# Patient Record
Sex: MALE | Race: WHITE | NOT HISPANIC OR LATINO | Employment: OTHER | URBAN - METROPOLITAN AREA
[De-identification: names, ages, dates, MRNs, and addresses within clinical notes are randomized per-mention and may not be internally consistent; named-entity substitution may affect disease eponyms.]

---

## 2023-08-11 ENCOUNTER — OFFICE VISIT (OUTPATIENT)
Dept: PRIMARY CARE | Facility: CLINIC | Age: 78
End: 2023-08-11
Payer: MEDICARE

## 2023-08-11 VITALS
HEIGHT: 75 IN | WEIGHT: 243 LBS | DIASTOLIC BLOOD PRESSURE: 62 MMHG | BODY MASS INDEX: 30.21 KG/M2 | HEART RATE: 62 BPM | SYSTOLIC BLOOD PRESSURE: 125 MMHG

## 2023-08-11 DIAGNOSIS — I77.810 DILATED AORTIC ROOT (CMS-HCC): ICD-10-CM

## 2023-08-11 DIAGNOSIS — E78.00 PURE HYPERCHOLESTEROLEMIA: ICD-10-CM

## 2023-08-11 DIAGNOSIS — I63.9 CEREBROVASCULAR ACCIDENT (CVA), UNSPECIFIED MECHANISM (MULTI): ICD-10-CM

## 2023-08-11 DIAGNOSIS — H90.6 MIXED CONDUCTIVE AND SENSORINEURAL HEARING LOSS OF BOTH EARS: ICD-10-CM

## 2023-08-11 DIAGNOSIS — H40.9 GLAUCOMA OF BOTH EYES, UNSPECIFIED GLAUCOMA TYPE: ICD-10-CM

## 2023-08-11 DIAGNOSIS — R35.0 BENIGN PROSTATIC HYPERPLASIA WITH URINARY FREQUENCY: ICD-10-CM

## 2023-08-11 DIAGNOSIS — I10 PRIMARY HYPERTENSION: ICD-10-CM

## 2023-08-11 DIAGNOSIS — I34.1 MITRAL VALVE PROLAPSE: ICD-10-CM

## 2023-08-11 DIAGNOSIS — N40.1 BENIGN PROSTATIC HYPERPLASIA WITH URINARY FREQUENCY: ICD-10-CM

## 2023-08-11 DIAGNOSIS — I50.9 HEART FAILURE, UNSPECIFIED HF CHRONICITY, UNSPECIFIED HEART FAILURE TYPE (MULTI): ICD-10-CM

## 2023-08-11 DIAGNOSIS — G47.33 OBSTRUCTIVE SLEEP APNEA (ADULT) (PEDIATRIC): ICD-10-CM

## 2023-08-11 DIAGNOSIS — I69.351 HEMIPLEGIA AND HEMIPARESIS FOLLOWING CEREBRAL INFARCTION AFFECTING RIGHT DOMINANT SIDE (MULTI): Primary | ICD-10-CM

## 2023-08-11 PROBLEM — G47.00 INSOMNIA: Status: ACTIVE | Noted: 2023-08-11

## 2023-08-11 PROBLEM — G56.20 ULNAR NERVE ENTRAPMENT: Status: ACTIVE | Noted: 2023-08-11

## 2023-08-11 PROBLEM — M48.00 SPINAL STENOSIS: Status: ACTIVE | Noted: 2023-08-11

## 2023-08-11 PROBLEM — M79.10 MYALGIA: Status: ACTIVE | Noted: 2023-08-11

## 2023-08-11 PROBLEM — M17.11 ARTHRITIS OF RIGHT KNEE: Status: ACTIVE | Noted: 2023-08-11

## 2023-08-11 PROBLEM — H93.13 BILATERAL TINNITUS: Status: RESOLVED | Noted: 2023-08-11 | Resolved: 2023-08-11

## 2023-08-11 PROBLEM — H93.13 BILATERAL TINNITUS: Status: ACTIVE | Noted: 2023-08-11

## 2023-08-11 PROBLEM — Z96.651 STATUS POST TOTAL RIGHT KNEE REPLACEMENT: Status: ACTIVE | Noted: 2023-08-11

## 2023-08-11 PROBLEM — N40.0 BPH (BENIGN PROSTATIC HYPERPLASIA): Status: ACTIVE | Noted: 2023-08-11

## 2023-08-11 PROBLEM — M17.12 ARTHRITIS OF KNEE, LEFT: Status: ACTIVE | Noted: 2023-08-11

## 2023-08-11 PROBLEM — R91.1 LUNG NODULE: Status: ACTIVE | Noted: 2023-08-11

## 2023-08-11 PROBLEM — G89.29 CHRONIC PAIN: Status: ACTIVE | Noted: 2023-08-11

## 2023-08-11 PROBLEM — M15.9 GENERALIZED OSTEOARTHRITIS OF MULTIPLE SITES: Status: ACTIVE | Noted: 2023-08-11

## 2023-08-11 PROBLEM — Z79.01 LONG TERM (CURRENT) USE OF ANTICOAGULANTS: Status: ACTIVE | Noted: 2021-05-17

## 2023-08-11 PROBLEM — M19.019 ARTHRITIS OF SHOULDER: Status: ACTIVE | Noted: 2023-08-11

## 2023-08-11 PROBLEM — J31.0 CHRONIC RHINITIS: Status: ACTIVE | Noted: 2023-08-11

## 2023-08-11 PROCEDURE — 3074F SYST BP LT 130 MM HG: CPT | Performed by: NURSE PRACTITIONER

## 2023-08-11 PROCEDURE — 3078F DIAST BP <80 MM HG: CPT | Performed by: NURSE PRACTITIONER

## 2023-08-11 PROCEDURE — 1036F TOBACCO NON-USER: CPT | Performed by: NURSE PRACTITIONER

## 2023-08-11 PROCEDURE — 1125F AMNT PAIN NOTED PAIN PRSNT: CPT | Performed by: NURSE PRACTITIONER

## 2023-08-11 PROCEDURE — 1159F MED LIST DOCD IN RCRD: CPT | Performed by: NURSE PRACTITIONER

## 2023-08-11 PROCEDURE — 1160F RVW MEDS BY RX/DR IN RCRD: CPT | Performed by: NURSE PRACTITIONER

## 2023-08-11 PROCEDURE — 99212 OFFICE O/P EST SF 10 MIN: CPT | Performed by: NURSE PRACTITIONER

## 2023-08-11 RX ORDER — NAPROXEN SODIUM 220 MG/1
1 TABLET, FILM COATED ORAL DAILY
COMMUNITY

## 2023-08-11 RX ORDER — ATORVASTATIN CALCIUM 40 MG/1
40 TABLET, FILM COATED ORAL NIGHTLY
COMMUNITY
End: 2024-01-12 | Stop reason: SDUPTHER

## 2023-08-11 RX ORDER — TRAMADOL HYDROCHLORIDE 50 MG/1
1 TABLET ORAL AS NEEDED
COMMUNITY
Start: 2023-01-12

## 2023-08-11 RX ORDER — TRAZODONE HYDROCHLORIDE 50 MG/1
50 TABLET ORAL NIGHTLY
COMMUNITY
Start: 2021-05-17 | End: 2024-01-12 | Stop reason: SDUPTHER

## 2023-08-11 RX ORDER — LOSARTAN POTASSIUM 100 MG/1
100 TABLET ORAL DAILY
COMMUNITY
End: 2024-01-12 | Stop reason: ALTCHOICE

## 2023-08-11 RX ORDER — TAMSULOSIN HYDROCHLORIDE 0.4 MG/1
1 CAPSULE ORAL DAILY
COMMUNITY

## 2023-08-11 RX ORDER — NETARSUDIL AND LATANOPROST OPHTHALMIC SOLUTION, 0.02%/0.005% .2; .05 MG/ML; MG/ML
SOLUTION/ DROPS OPHTHALMIC; TOPICAL
COMMUNITY
Start: 2022-09-22

## 2023-08-11 RX ORDER — BRIMONIDINE TARTRATE 1 MG/ML
SOLUTION/ DROPS OPHTHALMIC 2 TIMES DAILY
COMMUNITY

## 2023-08-11 ASSESSMENT — ENCOUNTER SYMPTOMS
OCCASIONAL FEELINGS OF UNSTEADINESS: 0
LOSS OF SENSATION IN FEET: 0
DEPRESSION: 0

## 2023-08-11 NOTE — PATIENT INSTRUCTIONS
Thank you for seeing me today. It was a pleasure to see you again!     #HTN  c/w Losartan 100 mg  daily  Your blood pressure should be </= 130/80. Today your blood pressure was 125/62  You should avoid foods that are high in sodium and saturated fats  Exercise daily for at least 30 minutes  minutes/week  Avoid stressful situations as this can increase your blood pressure  Take your medications as prescribed--do not skip doses  Obtain a BP monitor and check your blood pressure at home. Check it around the same time each day, at least 1 hour after taking your medication. Record your blood pressure in a log and bring your log with you to your next appointment.     [facility]      RTC FAY FOR AWV AND AS NEEDED

## 2023-08-11 NOTE — PROGRESS NOTES
"Subjective   Chief Complaint   Patient presents with    Follow-up     Willy is here today for routine 6 month F/U.        Patient ID: Willy Reed is a 78 y.o. male who presents for Follow-up (Willy is here today for routine 6 month F/U. ).    HPI  Est 77 yo male pt presents today for 6 mo f/u on HTN  [facility], and sees a CNP M.Mueller twice/year  [facility] most of [facility]s [facility], except Atorvastatin, Trazodone, Losartan,and Tramadol    PMH: HTN, HLD, GLAUCOMA, BPH, CVA X 3 (1999, 2017, 2021), INSOMNIA      [facility] and as fitted for new hearing aids  Will be picking them up later this month         Pt does not need any med refills today         #HTN       BP= 142/80 in Feb 2023       Rx Losartan 100 mg daily        BP today= 125/62       Cardiology: Dr. Crockett     #INSOMNIA  Pt takes Trazodone occasionally at bedtime for sleep, not nightly     #ARTHRITIC PAIN  Pt takes Tramadol every once in awhile for knee/shoulder pain, does not take daily/regularly   Pt is seeing Dr. Haney for his knee, has received injections     #CVA  1st occurrence 1999  had smaller strokes in 2017 and 2021  Rx Eliquis and Atorvastatin      #BPH  Taking Tamsulosin   no concerns     #HLD  FLP---> 107/55/49 Dec 2022  Rx Atorvastatin 80 mg   denies myalgias     Review of Systems  All 13 systems were reviewed and are within normal limits except positive and pertinent negative responses which are noted below or in HPI.      Objective   /62   Pulse 62   Ht 1.905 m (6' 3\")   Wt 110 kg (243 lb)   BMI 30.37 kg/m²        Physical Exam  Vitals reviewed.   Cardiovascular:      Pulses: Normal pulses.   Pulmonary:      Effort: Pulmonary effort is normal.   Musculoskeletal:         General: Normal range of motion.   Skin:     Capillary Refill: Capillary refill takes less than 2 seconds.   Neurological:      General: No focal deficit present.      Mental Status: He is alert and " oriented to person, place, and time.   Psychiatric:         Mood and Affect: Mood normal.         Assessment/Plan   Problem List Items Addressed This Visit       RESOLVED: Hemiplegia and hemiparesis following cerebral infarction affecting right dominant side (CMS/HCC) - Primary    Dilated aortic root (CMS/HCC)    Cerebrovascular accident (CVA) (CMS/HCC)    BPH (benign prostatic hyperplasia)    Glaucoma     [facility]          HTN (hypertension)    Mitral valve prolapse    Mixed conductive and sensorineural hearing loss of both ears    Obstructive sleep apnea (adult) (pediatric)     Wears CPAP nightly          Pure hypercholesterolemia

## 2023-09-06 ENCOUNTER — HOSPITAL ENCOUNTER (OUTPATIENT)
Dept: DATA CONVERSION | Facility: HOSPITAL | Age: 78
End: 2023-09-06

## 2023-09-06 DIAGNOSIS — R91.1 SOLITARY PULMONARY NODULE: ICD-10-CM

## 2023-10-18 PROBLEM — L90.5 SCAR CONDITION AND FIBROSIS OF SKIN: Status: ACTIVE | Noted: 2018-01-12

## 2023-10-18 PROBLEM — S20.219A RIB CONTUSION: Status: ACTIVE | Noted: 2023-10-18

## 2023-10-18 PROBLEM — H61.20 CERUMEN IMPACTION: Status: ACTIVE | Noted: 2023-10-18

## 2023-10-18 PROBLEM — M25.519 SHOULDER PAIN: Status: ACTIVE | Noted: 2023-10-18

## 2023-10-18 PROBLEM — N40.1 ENLARGED PROSTATE WITH LOWER URINARY TRACT SYMPTOMS (LUTS): Status: ACTIVE | Noted: 2023-10-18

## 2023-10-18 PROBLEM — E78.49 FAMILIAL COMBINED HYPERLIPIDEMIA: Status: ACTIVE | Noted: 2023-10-18

## 2023-10-18 PROBLEM — M79.609 LIMB PAIN: Status: ACTIVE | Noted: 2023-10-18

## 2023-10-18 PROBLEM — D18.01 HEMANGIOMA OF SKIN AND SUBCUTANEOUS TISSUE: Status: ACTIVE | Noted: 2018-01-12

## 2023-10-18 PROBLEM — L81.4 OTHER MELANIN HYPERPIGMENTATION: Status: ACTIVE | Noted: 2018-01-12

## 2023-10-18 PROBLEM — D22.30 MELANOCYTIC NEVI OF UNSPECIFIED PART OF FACE: Status: ACTIVE | Noted: 2018-01-12

## 2023-10-18 PROBLEM — M25.569 JOINT PAIN, KNEE: Status: ACTIVE | Noted: 2023-10-18

## 2023-10-18 PROBLEM — D49.9 NEOPLASM: Status: ACTIVE | Noted: 2018-01-12

## 2023-10-18 PROBLEM — L91.8 OTHER HYPERTROPHIC DISORDERS OF THE SKIN: Status: ACTIVE | Noted: 2018-01-12

## 2023-10-18 PROBLEM — D49.2 NEOPLASM OF UNSPECIFIED BEHAVIOR OF BONE, SOFT TISSUE, AND SKIN: Status: ACTIVE | Noted: 2018-01-12

## 2023-10-18 PROBLEM — Z79.899 HIGH RISK MEDICATION USE: Status: ACTIVE | Noted: 2023-10-18

## 2023-10-18 PROBLEM — D22.60 MELANOCYTIC NEVI OF UNSPECIFIED UPPER LIMB, INCLUDING SHOULDER: Status: ACTIVE | Noted: 2018-01-12

## 2023-10-18 PROBLEM — R91.1 LESION OF LUNG: Status: ACTIVE | Noted: 2023-10-18
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
## 2023-10-18 PROBLEM — I63.9 STROKE (MULTI): Status: ACTIVE | Noted: 2023-10-18

## 2023-10-18 PROBLEM — Z86.73 HISTORY OF CEREBROVASCULAR ACCIDENT: Status: ACTIVE | Noted: 2023-10-18

## 2023-10-18 PROBLEM — L82.1 OTHER SEBORRHEIC KERATOSIS: Status: ACTIVE | Noted: 2018-01-12

## 2023-10-18 PROBLEM — R10.9 ABDOMINAL PAIN: Status: ACTIVE | Noted: 2023-10-18

## 2023-10-18 RX ORDER — BISMUTH SUBSALICYLATE 262 MG
1 TABLET,CHEWABLE ORAL DAILY
COMMUNITY

## 2023-10-18 RX ORDER — LOSARTAN POTASSIUM AND HYDROCHLOROTHIAZIDE 25; 100 MG/1; MG/1
1 TABLET ORAL DAILY
COMMUNITY

## 2023-10-18 RX ORDER — BRIMONIDINE TARTRATE 1.5 MG/ML
1 SOLUTION/ DROPS OPHTHALMIC 2 TIMES DAILY
COMMUNITY
End: 2024-01-12 | Stop reason: SDUPTHER

## 2023-10-18 RX ORDER — ASPIRIN 81 MG/1
81 TABLET ORAL DAILY
COMMUNITY
End: 2024-01-12 | Stop reason: WASHOUT

## 2023-10-19 ENCOUNTER — OFFICE VISIT (OUTPATIENT)
Dept: ORTHOPEDIC SURGERY | Facility: CLINIC | Age: 78
End: 2023-10-19
Payer: MEDICARE

## 2023-10-19 DIAGNOSIS — M17.12 ARTHRITIS OF KNEE, LEFT: Primary | ICD-10-CM

## 2023-10-19 PROCEDURE — 1159F MED LIST DOCD IN RCRD: CPT | Performed by: ORTHOPAEDIC SURGERY

## 2023-10-19 PROCEDURE — 99214 OFFICE O/P EST MOD 30 MIN: CPT | Performed by: ORTHOPAEDIC SURGERY

## 2023-10-19 PROCEDURE — 1036F TOBACCO NON-USER: CPT | Performed by: ORTHOPAEDIC SURGERY

## 2023-10-19 PROCEDURE — 20610 DRAIN/INJ JOINT/BURSA W/O US: CPT | Performed by: ORTHOPAEDIC SURGERY

## 2023-10-19 PROCEDURE — 1125F AMNT PAIN NOTED PAIN PRSNT: CPT | Performed by: ORTHOPAEDIC SURGERY

## 2023-10-19 PROCEDURE — 1160F RVW MEDS BY RX/DR IN RCRD: CPT | Performed by: ORTHOPAEDIC SURGERY

## 2023-10-19 NOTE — PROGRESS NOTES
This is a consultation from CHING Mancilla-CNP for   Chief Complaint   Patient presents with    Left Knee - Pain       This is a 78 y.o. male who presents for evaluation of left knee pain.  Patient is left knee arthritis, cortisone injection little under a year ago.  Given good relief for a long time.  He is noted in the last 1 to 2 months has had return of symptoms exacerbation of his pain.  Planes of sharp stabbing pain over the medial aspect of the knee worse with walking.  Improving with rest.  Occasionally takes Tylenol but not often.  Does not use any assistive devices.  Able to do all his normal activities.    Physical Exam    There has been no interval change in this patient's past medical, surgical, medications, allergies, family history or social history since the most recent visit to a provider within our department. 14 point review of systems was performed, reviewed, and negative except for pertinent positives documented in the history of present illness.     Constitutional: well developed, well nourished male in no acute distress  Psychiatric: normal mood, appropriate affect  Eyes: sclera anicteric  HENT: normocephalic/atraumatic  CV: regular rate and rhythm   Respiratory: non labored breathing  Integumentary: no rash  Neurological: moves all extremities    Left knee exam: skin intact no lacerations or abrations.  1+ effusion.  Tender medial joint line. negative log roll negative patellar grind. ROM 5-100. stable to varus and valgus stress at 0 and 30 degrees. negative lachman negative posterior drawer negative kathleen. 5/5 ehl/fhl/gs/ta. silt s/s/sp/dp/t. 2+ dp/pt        Xrays were ordered by me, they were reviewed and independently interpreted by me today, they show severe degenerative disease bone-on-bone arthritis    Procedure Note:    Diagnosis: left knee arthritis  Procedure: left knee injection    Verbal consent was obtained from the patient, risks benefits and alternatives were  discussed. We discussed the risks and benefits and potential morbidity related to the treatment, and to the prescription medication administered in the injectionA timeout was performed, and the correct patient and site of injection were identified and verified. The lateral side of the knee was sterilized with Betadine, and anesthetized with ethyl chloride spray. The left knee was injected with 1ml kenalog and 4ml lidocaine in the usual fashion. A sterile Band-Aid was placed. The patient tolerated the procedure well with no immediate complications. Standard post injection precautions and instructions were given.        Procedures      Impression/Plan: This is a 78 y.o. male with left knee arthritis.  I had an in depth discussion with the patient regarding treatment options for arthritis and their relative risks and benefits. We reviewed surgical and nonsurgical option for treatment. Treatments include anti inflammatory medications, physical therapy, weight loss, activity modification, use of assistive devices, injection therapies. We discussed current prescriptions and risks and benefits of continuation of prescription medication as apporpriate. We discussed that arthritis is often progressive over time, an in end stage arthritis surgical interventions can be considered, including arthroplasty. All questions were answered and the patient voiced their understanding.  Doing well with nonsurgical treatment I will see him back as needed    BMI Readings from Last 1 Encounters:   08/11/23 30.37 kg/m²      Lab Results   Component Value Date    CREATININE 1.02 02/14/2023     Tobacco Use: Low Risk  (10/19/2023)    Patient History     Smoking Tobacco Use: Never     Smokeless Tobacco Use: Never     Passive Exposure: Never      Computed MELD 3.0 unavailable. Necessary lab results were not found in the last year.  Computed MELD-Na unavailable. Necessary lab results were not found in the last year.       Lab Results   Component  "Value Date    [facility] 6.3 (A) 01/25/2022     No results found for: \"STAPHMRSASCR\"  "

## 2023-12-05 RX ORDER — TRIAMCINOLONE ACET/0.9%NACL/PF 40 MG/ML
10 VIAL (ML) INJECTION ONCE
Status: COMPLETED | OUTPATIENT
Start: 2023-12-05 | End: 2023-12-05

## 2023-12-05 RX ADMIN — Medication 10 MG: at 16:40

## 2023-12-05 RX ADMIN — Medication 10 MG: at 10:36

## 2023-12-31 PROBLEM — I50.9 HEART FAILURE, UNSPECIFIED HF CHRONICITY, UNSPECIFIED HEART FAILURE TYPE (MULTI): Status: ACTIVE | Noted: 2023-12-31

## 2024-01-11 NOTE — PROGRESS NOTES
"Subjective   Reason for Visit: Willy Reed is an 78 y.o. male here for a Medicare Wellness visit.     Past Medical, Surgical, and Family History reviewed and updated in chart.    Reviewed all medications by prescribing practitioner or clinical pharmacist (such as prescriptions, OTCs, herbal therapies and supplements) and documented in the medical record.    HPI    Willy is an 79 yo male pt presenting today for  f/u on HTN and AWV     [facility], and sees LEO Metcalf twice/year  [facility] most of [facility], except Atorvastatin, Trazodone, Losartan-hydrochlorothiazide and Tramadol     PMH: HTN, HLD, GLAUCOMA, BPH, CVA X 3 (1999, 2017, 2021), INSOMNIA      [facility] and was fitted for new hearing aids    Pt started using Diclofenac on his hands and its     helping     Pt had labs (CBC, CMP)  done 12/4/23 per [facility]     #HTN  Rx Losartan 100-hydrochlorothiazide 25 mg daily   BP today= 136/84  Cardiology: Dr. Crockett     #INSOMNIA  Pt takes Trazodone occasionally at bedtime for sleep, not nightly     #ARTHRITIC PAIN  Pt takes Tramadol every once in awhile for knee/shoulder pain, does not take daily/regularly   Pt is seeing Dr. Haney for his knee, has received injections     #CVA  1st occurrence 1999  had smaller strokes in 2017 and 2021  Rx Eliquis and Atorvastatin      #BPH  Taking Tamsulosin   no concerns     #HLD  FLP---> 106/49/65 Feb 2023  Rx Atorvastatin 80 mg   denies myalgias       Patient Care Team:  MYNOR Longoria as PCP - General (Family Medicine)  MY[facility]  Keily Phelps MD as Primary Care Provider       Review of Systems  All 13 systems were reviewed and are within normal limits except positive and pertinent negative responses which are noted below or in HPI.      Objective   Vitals:  /84   Pulse 58   Ht 1.905 m (6' 3\")   Wt 111 kg (244 lb)   " SpO2 95%   BMI 30.50 kg/m²       Current Outpatient Medications   Medication Instructions    apixaban (Eliquis) 5 mg tablet 1 tablet, oral, Every 12 hours    aspirin 81 mg chewable tablet 1 tablet, oral, Daily    atorvastatin (LIPITOR) 40 mg, oral, Nightly    brimonidine (AlphaGAN P) 0.1 % ophthalmic solution ophthalmic (eye), 2 times daily    losartan-hydrochlorothiazide (Hyzaar) 100-25 mg tablet 1 tablet, oral, Daily    multivitamin tablet 1 tablet, oral, Daily    netarsudiL-latanoprost (Rocklatan) 0.02-0.005 % drops ophthalmic (eye), Daily RT    tamsulosin (Flomax) 0.4 mg 24 hr capsule 1 capsule, oral, Daily    traMADol (Ultram) 50 mg tablet 1 tablet, oral, Every 8 hours    traZODone (DESYREL) 50 mg, oral, Nightly         Physical Exam  Vitals reviewed.   Cardiovascular:      Pulses: Normal pulses.      Heart sounds: Normal heart sounds.   Pulmonary:      Effort: Pulmonary effort is normal.      Breath sounds: Normal breath sounds.   Psychiatric:         Mood and Affect: Mood normal.         Assessment/Plan     Problem List Items Addressed This Visit             ICD-10-CM    BPH (benign prostatic hyperplasia) N40.0    HTN (hypertension) I10    Relevant Medications    atorvastatin (Lipitor) 40 mg tablet    Insomnia G47.00    Relevant Medications    traZODone (Desyrel) 50 mg tablet    Mitral valve prolapse I34.1    Long term (current) use of anticoagulants Z79.01    Enlarged prostate with lower urinary tract symptoms (LUTS) N40.1    High risk medication use Z79.899     Other Visit Diagnoses         Codes    Medicare annual wellness visit, subsequent    -  Primary Z00.00    Routine general medical examination at health care facility     Z00.00

## 2024-01-12 ENCOUNTER — OFFICE VISIT (OUTPATIENT)
Dept: PRIMARY CARE | Facility: CLINIC | Age: 79
End: 2024-01-12
Payer: MEDICARE

## 2024-01-12 VITALS
BODY MASS INDEX: 30.34 KG/M2 | OXYGEN SATURATION: 95 % | HEIGHT: 75 IN | SYSTOLIC BLOOD PRESSURE: 136 MMHG | DIASTOLIC BLOOD PRESSURE: 84 MMHG | WEIGHT: 244 LBS | HEART RATE: 58 BPM

## 2024-01-12 DIAGNOSIS — Z00.00 ROUTINE GENERAL MEDICAL EXAMINATION AT HEALTH CARE FACILITY: ICD-10-CM

## 2024-01-12 DIAGNOSIS — I34.1 MITRAL VALVE PROLAPSE: ICD-10-CM

## 2024-01-12 DIAGNOSIS — R35.0 BENIGN PROSTATIC HYPERPLASIA WITH URINARY FREQUENCY: ICD-10-CM

## 2024-01-12 DIAGNOSIS — Z79.01 LONG TERM (CURRENT) USE OF ANTICOAGULANTS: ICD-10-CM

## 2024-01-12 DIAGNOSIS — F51.01 PRIMARY INSOMNIA: ICD-10-CM

## 2024-01-12 DIAGNOSIS — I10 PRIMARY HYPERTENSION: ICD-10-CM

## 2024-01-12 DIAGNOSIS — R35.1 BENIGN PROSTATIC HYPERPLASIA WITH NOCTURIA: ICD-10-CM

## 2024-01-12 DIAGNOSIS — Z79.899 HIGH RISK MEDICATION USE: ICD-10-CM

## 2024-01-12 DIAGNOSIS — Z00.00 MEDICARE ANNUAL WELLNESS VISIT, SUBSEQUENT: Primary | ICD-10-CM

## 2024-01-12 DIAGNOSIS — N40.1 BENIGN PROSTATIC HYPERPLASIA WITH NOCTURIA: ICD-10-CM

## 2024-01-12 DIAGNOSIS — N40.1 BENIGN PROSTATIC HYPERPLASIA WITH URINARY FREQUENCY: ICD-10-CM

## 2024-01-12 PROCEDURE — 1159F MED LIST DOCD IN RCRD: CPT | Performed by: NURSE PRACTITIONER

## 2024-01-12 PROCEDURE — 3079F DIAST BP 80-89 MM HG: CPT | Performed by: NURSE PRACTITIONER

## 2024-01-12 PROCEDURE — 1170F FXNL STATUS ASSESSED: CPT | Performed by: NURSE PRACTITIONER

## 2024-01-12 PROCEDURE — 1125F AMNT PAIN NOTED PAIN PRSNT: CPT | Performed by: NURSE PRACTITIONER

## 2024-01-12 PROCEDURE — 1036F TOBACCO NON-USER: CPT | Performed by: NURSE PRACTITIONER

## 2024-01-12 PROCEDURE — 99213 OFFICE O/P EST LOW 20 MIN: CPT | Performed by: NURSE PRACTITIONER

## 2024-01-12 PROCEDURE — 3075F SYST BP GE 130 - 139MM HG: CPT | Performed by: NURSE PRACTITIONER

## 2024-01-12 PROCEDURE — G0439 PPPS, SUBSEQ VISIT: HCPCS | Performed by: NURSE PRACTITIONER

## 2024-01-12 RX ORDER — ATORVASTATIN CALCIUM 40 MG/1
40 TABLET, FILM COATED ORAL NIGHTLY
Qty: 90 TABLET | Refills: 3 | Status: SHIPPED | OUTPATIENT
Start: 2024-01-12 | End: 2024-03-15

## 2024-01-12 RX ORDER — TRAZODONE HYDROCHLORIDE 50 MG/1
50 TABLET ORAL NIGHTLY
Qty: 90 TABLET | Refills: 0 | Status: SHIPPED | OUTPATIENT
Start: 2024-01-12

## 2024-01-12 ASSESSMENT — ACTIVITIES OF DAILY LIVING (ADL)
BATHING: INDEPENDENT
MANAGING_FINANCES: INDEPENDENT
DRESSING: INDEPENDENT
GROCERY_SHOPPING: INDEPENDENT
TAKING_MEDICATION: INDEPENDENT
DOING_HOUSEWORK: INDEPENDENT

## 2024-01-12 ASSESSMENT — PATIENT HEALTH QUESTIONNAIRE - PHQ9
SUM OF ALL RESPONSES TO PHQ9 QUESTIONS 1 AND 2: 0
2. FEELING DOWN, DEPRESSED OR HOPELESS: NOT AT ALL
1. LITTLE INTEREST OR PLEASURE IN DOING THINGS: NOT AT ALL

## 2024-01-12 ASSESSMENT — ENCOUNTER SYMPTOMS
DEPRESSION: 0
LOSS OF SENSATION IN FEET: 0
OCCASIONAL FEELINGS OF UNSTEADINESS: 0

## 2024-01-12 NOTE — PATIENT INSTRUCTIONS
Thank you for seeing me today.  It was a pleasure to see you again!     Today we did your Annual Medicare Wellness Exam and discussed the following:     Continue medications as prescribed    [facility] per [facility]    RTC ANNUALLY AND AS NEEDED

## 2024-01-17 ENCOUNTER — OFFICE VISIT (OUTPATIENT)
Dept: OTOLARYNGOLOGY | Facility: CLINIC | Age: 79
End: 2024-01-17
Payer: MEDICARE

## 2024-01-17 VITALS — BODY MASS INDEX: 30.21 KG/M2 | WEIGHT: 243 LBS | HEIGHT: 75 IN

## 2024-01-17 DIAGNOSIS — H90.3 BILATERAL SENSORINEURAL HEARING LOSS: ICD-10-CM

## 2024-01-17 DIAGNOSIS — H61.23 BILATERAL IMPACTED CERUMEN: ICD-10-CM

## 2024-01-17 DIAGNOSIS — G47.33 OBSTRUCTIVE SLEEP APNEA: Primary | ICD-10-CM

## 2024-01-17 PROCEDURE — 99213 OFFICE O/P EST LOW 20 MIN: CPT | Performed by: OTOLARYNGOLOGY

## 2024-01-17 PROCEDURE — 1159F MED LIST DOCD IN RCRD: CPT | Performed by: OTOLARYNGOLOGY

## 2024-01-17 PROCEDURE — 1036F TOBACCO NON-USER: CPT | Performed by: OTOLARYNGOLOGY

## 2024-01-17 PROCEDURE — 1160F RVW MEDS BY RX/DR IN RCRD: CPT | Performed by: OTOLARYNGOLOGY

## 2024-01-17 PROCEDURE — 1125F AMNT PAIN NOTED PAIN PRSNT: CPT | Performed by: OTOLARYNGOLOGY

## 2024-01-17 NOTE — PROGRESS NOTES
"HPI  Willy Reed is a 78 y.o. male prone to wax.  Requires cleaning every 6 months.  Wears bilateral hearing aids.  Has CPAP and uses faithfully.  Doing very well with this.  Here for 6-month cerumen management.  He has not having specific plugging, otalgia or otorrhea.      Past Medical History:   Diagnosis Date    Cerebral infarction, unspecified (CMS/HCC) 01/25/2022    CVA (cerebral vascular accident)    Essential (primary) hypertension 09/22/2022    HTN (hypertension)    Personal history of other diseases of the circulatory system 12/17/2019    History of atrial septal defect    Personal history of other diseases of the nervous system and sense organs     History of sleep apnea    Sleep apnea             Medications:     Current Outpatient Medications:     apixaban (Eliquis) 5 mg tablet, Take 1 tablet (5 mg) by mouth every 12 hours., Disp: , Rfl:     aspirin 81 mg chewable tablet, Chew 1 tablet (81 mg) once daily., Disp: , Rfl:     atorvastatin (Lipitor) 40 mg tablet, Take 1 tablet (40 mg) by mouth once daily at bedtime., Disp: 90 tablet, Rfl: 3    brimonidine (AlphaGAN P) 0.1 % ophthalmic solution, Administer into affected eye(s) twice a day., Disp: , Rfl:     losartan-hydrochlorothiazide (Hyzaar) 100-25 mg tablet, Take 1 tablet by mouth once daily., Disp: , Rfl:     multivitamin tablet, Take 1 tablet by mouth once daily., Disp: , Rfl:     netarsudiL-latanoprost (Rocklatan) 0.02-0.005 % drops, Administer into affected eye(s) once daily., Disp: , Rfl:     tamsulosin (Flomax) 0.4 mg 24 hr capsule, Take 1 capsule (0.4 mg) by mouth once daily., Disp: , Rfl:     traMADol (Ultram) 50 mg tablet, Take 1 tablet (50 mg) by mouth every 8 hours., Disp: , Rfl:     traZODone (Desyrel) 50 mg tablet, Take 1 tablet (50 mg) by mouth once daily at bedtime., Disp: 90 tablet, Rfl: 0     Allergies:  Allergies   Allergen Reactions    Morphine Nausea And Vomiting        Physical Exam:  Last Recorded Vitals  Height 1.905 m (6' 3\"), " weight 110 kg (243 lb).  General:     General appearance: Well-developed, well-nourished in no acute distress.       Voice:  normal       Head/face: Normal appearance; nontender to palpation     Facial nerve function: Normal and symmetric bilaterally.    Oral/oropharynx:     Oral vestibule: Normal labial and gingival mucosa     Tongue/floor of mouth: Normal without lesion     Oropharynx: Clear.  No lesions present of the hard/soft palate, posterior pharynx    Neck:     Neck: Normal appearance, trachea midline     Salivary glands: Normal to palpation bilaterally     Lymph nodes: No cervical lymphadenopathy to palpation     Thyroid: No thyromegaly.  No palpable nodules     Range of motion: Normal    Neurological:     Cortical functions: Alert and oriented x3, appropriate affect       Larynx/hypopharynx:     Laryngeal findings: Mirror exam inadequate or limited secondary to enlarged base of tongue and/or excessive gagging    Ear:     Ear canal: Normal bilaterally after cleaning occlusive cerumen impaction bilaterally with microscope, suction, alligator     Tympanic membrane: Intact and mobile bilaterally     Pinna: Normal bilaterally     Hearing:  Gross hearing assessment normal by voice    Nose:     Visualized using: Anterior rhinoscopy     Nasopharynx: Inadequate mirror exam secondary to gag, anatomy.       Nasal dorsum: Nontraumatic midline appearance     Septum: Midline     Inferior turbinates: Normally sized     Mucosa: Bilateral, pink, normal appearing       Assessment/Plan   Ear canals cleaned bilaterally.  Hearing aids were placed.  He will continue his hearing aids and follow-up with us in 6 months.  He will continue his CPAP in the meantime         Con Martinez MD

## 2024-03-04 ENCOUNTER — APPOINTMENT (OUTPATIENT)
Dept: RADIOLOGY | Facility: CLINIC | Age: 79
End: 2024-03-04
Payer: MEDICARE

## 2024-03-06 ENCOUNTER — HOSPITAL ENCOUNTER (OUTPATIENT)
Dept: RADIOLOGY | Facility: CLINIC | Age: 79
Discharge: HOME | End: 2024-03-06
Payer: MEDICARE

## 2024-03-06 DIAGNOSIS — R91.1 SOLITARY PULMONARY NODULE: ICD-10-CM

## 2024-03-06 PROCEDURE — 71250 CT THORAX DX C-: CPT | Performed by: RADIOLOGY

## 2024-03-06 PROCEDURE — 71250 CT THORAX DX C-: CPT

## 2024-03-15 DIAGNOSIS — I10 PRIMARY HYPERTENSION: ICD-10-CM

## 2024-03-15 RX ORDER — ATORVASTATIN CALCIUM 40 MG/1
40 TABLET, FILM COATED ORAL NIGHTLY
Qty: 90 TABLET | Refills: 3 | Status: SHIPPED | OUTPATIENT
Start: 2024-03-15 | End: 2024-03-22 | Stop reason: SDUPTHER

## 2024-03-20 NOTE — PROGRESS NOTES
"Chief Complaint:   No chief complaint on file.    History Of Present Illness:    .Mr Reed returns in follow up.  Denies chest pain, sob, palpitations or pedal edema.  [facility]         Last Recorded Vitals:  Blood pressure 123/73, pulse 70, resp. rate 16, height 1.905 m (6' 3\"), weight 111 kg (244 lb), SpO2 96 %.     Past Medical History:  Past Medical History:   Diagnosis Date    Cerebral infarction, unspecified (CMS/HCC) 01/25/2022    CVA (cerebral vascular accident)    Essential (primary) hypertension 09/22/2022    HTN (hypertension)    Personal history of other diseases of the circulatory system 12/17/2019    History of atrial septal defect    Personal history of other diseases of the nervous system and sense organs     History of sleep apnea    Sleep apnea         Past Surgical History:  Past Surgical History:   Procedure Laterality Date    CORONARY ARTERY BYPASS GRAFT  09/01/2016    [facility]ABG    MR HEAD ANGIO WO IV CONTRAST  6/28/2016    MR HEAD ANGIO WO IV CONTRAST 6/28/2016 [facility]    MR HEAD ANGIO WO IV CONTRAST  1/19/2021    MR HEAD ANGIO WO IV CONTRAST [facility]    MR NECK ANGIO WO IV CONTRAST  6/28/2016    MR NECK ANGIO WO IV CONTRAST 6/28/2016 [facility]    MR NECK ANGIO WO IV CONTRAST  1/20/2021    MR NECK ANGIO WO IV CONTRAST [facility]    OTHER SURGICAL HISTORY  06/25/2013    Injection Of Neurolytic Substance Epidural Thoracic    OTHER SURGICAL HISTORY  07/25/2022    Heart surgery    OTHER SURGICAL HISTORY  07/25/2022    Knee surgery    OTHER SURGICAL HISTORY  09/01/2016    Shoulder Surgery Left    SHOULDER SURGERY  09/01/2016    Shoulder Surgery Right       Social History:  Social History     Socioeconomic History    Marital status:      Spouse name: None    Number of children: None    Years of education: None    Highest education level: None   Occupational History    None   Tobacco Use    Smoking status: Never     " Passive exposure: Never    Smokeless tobacco: Never   Vaping Use    Vaping Use: Never used   Substance and Sexual Activity    Alcohol use: Not Currently     Comment: rare    Drug use: Never    Sexual activity: None   Other Topics Concern    None   Social History Narrative    None     Social Determinants of Health     Financial Resource Strain: Not on file   Food Insecurity: Not on file   Transportation Needs: Not on file   Physical Activity: Not on file   Stress: Not on file   Social Connections: Not on file   Intimate Partner Violence: Not on file   Housing Stability: Not on file       Family History:  No family history on file.      Allergies:  Morphine    Outpatient Medications:  Current Outpatient Medications   Medication Sig Dispense Refill    apixaban (Eliquis) 5 mg tablet Take 1 tablet (5 mg) by mouth every 12 hours.      aspirin 81 mg chewable tablet Chew 1 tablet (81 mg) once daily.      atorvastatin (Lipitor) 40 mg tablet TAKE 1 TABLET BY MOUTH EVERYDAY AT BEDTIME 90 tablet 3    brimonidine (AlphaGAN P) 0.1 % ophthalmic solution Administer into affected eye(s) twice a day.      losartan-hydrochlorothiazide (Hyzaar) 100-25 mg tablet Take 1 tablet by mouth once daily.      multivit with minerals/lutein (MULTIVITAMIN 50 PLUS ORAL) Take by mouth once daily.      multivitamin tablet Take 1 tablet by mouth once daily.      netarsudiL-latanoprost (Rocklatan) 0.02-0.005 % drops Administer into affected eye(s) once daily.      tamsulosin (Flomax) 0.4 mg 24 hr capsule Take 1 capsule (0.4 mg) by mouth once daily.      traMADol (Ultram) 50 mg tablet Take 1 tablet (50 mg) by mouth if needed.      traZODone (Desyrel) 50 mg tablet Take 1 tablet (50 mg) by mouth once daily at bedtime. 90 tablet 0     No current facility-administered medications for this visit.        Physical Exam:  Cardiovascular:      PMI at left midclavicular line. Normal rate. Regular rhythm. Normal S1. Normal S2.       Murmurs: There is a grade 1to  2/6 systolic murmur.      No gallop.  No click. No rub.   Pulses:     Intact distal pulses.   Edema:     Peripheral edema absent.         ROS:  Review of Systems   Constitutional: Negative.   Cardiovascular: Negative.    Respiratory: Negative.     Skin: Negative.    Musculoskeletal: Negative.    Gastrointestinal: Negative.    Genitourinary: Negative.    Neurological: Negative.           Last Labs:  CBC -  Lab Results   Component Value Date    WBC 9.1 01/25/2022    HGB 16.4 01/25/2022    HCT 50.8 01/25/2022    MCV 94 01/25/2022     01/25/2022       CMP -  Lab Results   Component Value Date    CALCIUM 9.1 02/14/2023    PROT 6.3 (L) 02/14/2023    ALBUMIN 4.1 02/14/2023    ALBUMIN 3.6 01/19/2021    AST 21 02/14/2023    ALT 24 02/14/2023    ALKPHOS 66 02/14/2023    BILITOT 1.3 (H) 02/14/2023       LIPID PANEL -   Lab Results   Component Value Date    CHOL 106 02/14/2023    TRIG 65 02/14/2023    HDL 43.8 02/14/2023    CHHDL 2.4 02/14/2023    CHHDL 2.2 01/19/2021    LDLF 49 02/14/2023    VLDL 13 02/14/2023       RENAL FUNCTION PANEL -   Lab Results   Component Value Date    GLUCOSE 113 (H) 02/14/2023     02/14/2023    K 4.3 02/14/2023     02/14/2023    CO2 29 02/14/2023    ANIONGAP 11 02/14/2023    BUN 15 02/14/2023    CREATININE 1.02 02/14/2023    GFRMALE 74 02/14/2023    CALCIUM 9.1 02/14/2023    ALBUMIN 4.1 02/14/2023    ALBUMIN 3.6 01/19/2021        Lab Results   Component Value Date    HGBA1C 6.3 (A) 01/25/2022         Assessment/Plan   Problem List Items Addressed This Visit    None  Visit Diagnoses       Hyperlipidemia, unspecified hyperlipidemia type    -  Primary    Relevant Orders    Transthoracic echo (TTE) complete    Abnormal electrocardiogram (ECG) (EKG)        Relevant Orders    Transthoracic echo (TTE) complete          Impressions     1. Acute left hemispheric ischemic stroke involving the vertex. The patient presented to [facility] with right arm and leg numbness on June 27th 2016.  Symptoms have resolved. Patient had a 48 hour holter monitor that showed a sinus rhythm  bpm with ectopic heart beats. Patient had a zio patch on 9/1/2016 that showed the rhythm is a sinus rhythm with a range from  bpm. Patient will continue Aspirin 81 mg daily and Plavix 75 mg daily. He currently is following up with Dr. Ortiz. Patient had a carotid ultrasound on 6/28/2016 that showed less than 50% stenosis carotid stenosis bilaterally.      2. History of a CVA in 1999 without residual effect.      3. ASD discovered and surgically repaired 1999 with Dr. Sweeney at [facility]. Patient had an echocardiogram on 6/28/2016 that showed an LVEF 60-65%, moderated left ventricular hypertrophy, no evidence of PFO, mild MR, mild TR and mild dilatation of the ascending aorta.      4. Hypertension blood pressure is well controlled with Losartan 50 mg daily. He was on Lisinopril that was switched to Losartan due to a cough      5. Hyperlipidemia Patient will continue Atorvastatin 40 mg daily.      6. Family history of CAD both his mother and father. Patient had a negative nuclear stress test 6/2013. Patient denies chest pain at today's office visit.      7. Sleep apnea patient currently uses a CPAP     8. Right shoulder replacement      9. Right clavicle and rib fracture secondary to a fall from syncope in 2007      10. Tonsillectomy      11. Former history of tobacco use in 1971      12. Patient had polycythemia with unknown etiology      13. CVA. Patient had another hospital stay, 01/2021 at [facility] with another CVA. MRI showed small strokes both sides of brain. Ultrasound of the carotid showed less than 50% stenosis bilaterally. Patient was driving his car into the driveway after picking up food and he could not move his foot from the accelerator to the brake pedal fast enough to slow down the pole in his driveway so he was forced to continue down the street into the cul-de-sac which allowed him to slow down  enough to pull into the driveway. He then reported to the emergency department. He has had a history of multiple strokes in the past all which had resolved without permanent deficits, however he was told the cause of his strokes were due to an an ASD which has been surgically repaired. Patient saw neurology with complaints of right-sided weakness. Echo was done and showed EF of 60%. He was taken off his Plavix and started on Eliquis, however he could not afford this and was started back on Coumadin. Right-sided weakness improved he did well with PT OT and was discharged home. Patient had an outpatient SYD which had showed similar findings. Patient wore a monitor which again did not show any atrial fibrillation.     14. Coumadin anticoagulation. At this point patient cannot afford DOAC.     15. Hx of covid-19, 11/2020 and both vaccines.      16. Hx of L1 vertebral fracture with home PT.          Joy Kay, CHING-CNP

## 2024-03-21 ENCOUNTER — OFFICE VISIT (OUTPATIENT)
Dept: CARDIOLOGY | Facility: CLINIC | Age: 79
End: 2024-03-21
Payer: MEDICARE

## 2024-03-21 VITALS
BODY MASS INDEX: 30.34 KG/M2 | HEART RATE: 70 BPM | WEIGHT: 244 LBS | DIASTOLIC BLOOD PRESSURE: 73 MMHG | SYSTOLIC BLOOD PRESSURE: 123 MMHG | OXYGEN SATURATION: 96 % | RESPIRATION RATE: 16 BRPM | HEIGHT: 75 IN

## 2024-03-21 DIAGNOSIS — E78.5 HYPERLIPIDEMIA, UNSPECIFIED HYPERLIPIDEMIA TYPE: Primary | ICD-10-CM

## 2024-03-21 DIAGNOSIS — R94.31 ABNORMAL ELECTROCARDIOGRAM (ECG) (EKG): ICD-10-CM

## 2024-03-21 PROCEDURE — 1160F RVW MEDS BY RX/DR IN RCRD: CPT | Performed by: NURSE PRACTITIONER

## 2024-03-21 PROCEDURE — 3078F DIAST BP <80 MM HG: CPT | Performed by: NURSE PRACTITIONER

## 2024-03-21 PROCEDURE — 3074F SYST BP LT 130 MM HG: CPT | Performed by: NURSE PRACTITIONER

## 2024-03-21 PROCEDURE — 99214 OFFICE O/P EST MOD 30 MIN: CPT | Performed by: NURSE PRACTITIONER

## 2024-03-21 PROCEDURE — 1036F TOBACCO NON-USER: CPT | Performed by: NURSE PRACTITIONER

## 2024-03-21 PROCEDURE — 1159F MED LIST DOCD IN RCRD: CPT | Performed by: NURSE PRACTITIONER

## 2024-03-21 PROCEDURE — 1123F ACP DISCUSS/DSCN MKR DOCD: CPT | Performed by: NURSE PRACTITIONER

## 2024-03-21 ASSESSMENT — ENCOUNTER SYMPTOMS
CONSTITUTIONAL NEGATIVE: 1
MUSCULOSKELETAL NEGATIVE: 1
NEUROLOGICAL NEGATIVE: 1
DEPRESSION: 0
CARDIOVASCULAR NEGATIVE: 1
RESPIRATORY NEGATIVE: 1
GASTROINTESTINAL NEGATIVE: 1

## 2024-03-21 ASSESSMENT — PATIENT HEALTH QUESTIONNAIRE - PHQ9
2. FEELING DOWN, DEPRESSED OR HOPELESS: NOT AT ALL
1. LITTLE INTEREST OR PLEASURE IN DOING THINGS: NOT AT ALL
SUM OF ALL RESPONSES TO PHQ9 QUESTIONS 1 AND 2: 0

## 2024-03-22 DIAGNOSIS — I10 PRIMARY HYPERTENSION: ICD-10-CM

## 2024-03-22 RX ORDER — ATORVASTATIN CALCIUM 40 MG/1
40 TABLET, FILM COATED ORAL NIGHTLY
Qty: 90 TABLET | Refills: 3 | Status: SHIPPED | OUTPATIENT
Start: 2024-03-22

## 2024-07-03 ENCOUNTER — TELEPHONE (OUTPATIENT)
Dept: PRIMARY CARE | Facility: CLINIC | Age: 79
End: 2024-07-03
Payer: MEDICARE

## 2024-07-03 DIAGNOSIS — F51.01 PRIMARY INSOMNIA: ICD-10-CM

## 2024-07-03 NOTE — TELEPHONE ENCOUNTER
Pt asking for refill of Trazadone prior to appt 7/12, LOV 1/12/24. Only has appt so he can get refill

## 2024-07-04 RX ORDER — TRAZODONE HYDROCHLORIDE 50 MG/1
50 TABLET ORAL NIGHTLY
Qty: 90 TABLET | Refills: 0 | Status: SHIPPED | OUTPATIENT
Start: 2024-07-04

## 2024-07-12 ENCOUNTER — APPOINTMENT (OUTPATIENT)
Dept: PRIMARY CARE | Facility: CLINIC | Age: 79
End: 2024-07-12
Payer: MEDICARE

## 2024-07-12 VITALS
OXYGEN SATURATION: 96 % | BODY MASS INDEX: 29.14 KG/M2 | WEIGHT: 234.4 LBS | HEIGHT: 75 IN | HEART RATE: 73 BPM | SYSTOLIC BLOOD PRESSURE: 118 MMHG | DIASTOLIC BLOOD PRESSURE: 68 MMHG

## 2024-07-12 DIAGNOSIS — F11.90 CHRONIC, CONTINUOUS USE OF OPIOIDS: ICD-10-CM

## 2024-07-12 DIAGNOSIS — I10 PRIMARY HYPERTENSION: Primary | ICD-10-CM

## 2024-07-12 DIAGNOSIS — I77.810 DILATED AORTIC ROOT (CMS-HCC): ICD-10-CM

## 2024-07-12 DIAGNOSIS — Z51.81 ENCOUNTER FOR THERAPEUTIC DRUG LEVEL MONITORING: ICD-10-CM

## 2024-07-12 DIAGNOSIS — Z79.899 HIGH RISK MEDICATION USE: ICD-10-CM

## 2024-07-12 DIAGNOSIS — F51.01 PRIMARY INSOMNIA: ICD-10-CM

## 2024-07-12 DIAGNOSIS — I50.9 HEART FAILURE, UNSPECIFIED HF CHRONICITY, UNSPECIFIED HEART FAILURE TYPE (MULTI): ICD-10-CM
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
## 2024-07-12 PROBLEM — S20.219A RIB CONTUSION: Status: RESOLVED | Noted: 2023-10-18 | Resolved: 2024-07-12

## 2024-07-12 PROBLEM — R10.9 ABDOMINAL PAIN: Status: RESOLVED | Noted: 2023-10-18 | Resolved: 2024-07-12

## 2024-07-12 PROBLEM — M79.10 MYALGIA: Status: RESOLVED | Noted: 2023-08-11 | Resolved: 2024-07-12

## 2024-07-12 PROBLEM — Z86.73 HISTORY OF CEREBROVASCULAR ACCIDENT: Status: ACTIVE | Noted: 2023-08-11

## 2024-07-12 PROBLEM — M79.609 LIMB PAIN: Status: RESOLVED | Noted: 2023-10-18 | Resolved: 2024-07-12

## 2024-07-12 LAB
POC AMPHETAMINE: NEGATIVE NG/ML
POC BARBITURATES: NEGATIVE NG/ML
POC BENZODIAZEPINES: NEGATIVE NG/ML
POC BUPRENORPHINE URINE: NEGATIVE NG/ML
POC COCAINE: NEGATIVE NG/ML
POC MDMA (NG/ML) IN URINE: NEGATIVE NG/ML
POC METHADONE MANUALLY ENTERED: NEGATIVE NG/ML
POC METHAMPHETAMINE: NEGATIVE NG/ML
POC MORPHINE URINE: NEGATIVE NG/ML
POC OPIATES: NEGATIVE NG/ML
POC OXYCODONE: NEGATIVE NG/ML
POC PHENCYCLIDINE (PCP): NEGATIVE NG/ML
POC THC: NEGATIVE NG/ML
POC TICYCLIC ANTIDEPRESSANTS (TCA): NEGATIVE NG/ML

## 2024-07-12 PROCEDURE — 1123F ACP DISCUSS/DSCN MKR DOCD: CPT | Performed by: NURSE PRACTITIONER

## 2024-07-12 PROCEDURE — 1036F TOBACCO NON-USER: CPT | Performed by: NURSE PRACTITIONER

## 2024-07-12 PROCEDURE — 99213 OFFICE O/P EST LOW 20 MIN: CPT | Performed by: NURSE PRACTITIONER

## 2024-07-12 PROCEDURE — 80305 DRUG TEST PRSMV DIR OPT OBS: CPT | Performed by: NURSE PRACTITIONER

## 2024-07-12 PROCEDURE — 3078F DIAST BP <80 MM HG: CPT | Performed by: NURSE PRACTITIONER

## 2024-07-12 PROCEDURE — 1158F ADVNC CARE PLAN TLK DOCD: CPT | Performed by: NURSE PRACTITIONER

## 2024-07-12 PROCEDURE — 3074F SYST BP LT 130 MM HG: CPT | Performed by: NURSE PRACTITIONER

## 2024-07-12 PROCEDURE — 1159F MED LIST DOCD IN RCRD: CPT | Performed by: NURSE PRACTITIONER

## 2024-07-12 PROCEDURE — 1160F RVW MEDS BY RX/DR IN RCRD: CPT | Performed by: NURSE PRACTITIONER

## 2024-07-12 RX ORDER — TRAZODONE HYDROCHLORIDE 50 MG/1
50 TABLET ORAL NIGHTLY
Qty: 90 TABLET | Refills: 1 | Status: SHIPPED | OUTPATIENT
Start: 2024-07-12

## 2024-07-12 NOTE — ASSESSMENT & PLAN NOTE
Orders:    traZODone (Desyrel) 50 mg tablet; Take 1 tablet (50 mg) by mouth once daily at bedtime.

## 2024-07-12 NOTE — PROGRESS NOTES
"Subjective   Chief Complaint   Patient presents with    Med Refill     Patient is coming in today for a medication refill and a bp check.        Patient ID: Willy Reed is a 79 y.o. male who presents for Med Refill (Patient is coming in today for a medication refill and a bp check. ).    HPI  Willy is an 78 yo est male pt presenting today for medication refill/6 month BP check up   LOV: 7/3/2024    [facility]     Pt takes Tramadol for chronic RIGHT shoulder pain  Had TSR in @ 2003  Takes tramadol for the pain when he \"over uses it\"   UDS due  CSA 2023    Pt takes Trazodone for sleep nightly   No concerns     Allergies   Allergen Reactions    Morphine Nausea And Vomiting       Review of Systems  ROS was completed and all systems are negative with the exception of what was noted in the the HPI.       Objective   /68   Pulse 73   Ht 1.905 m (6' 3\")   Wt 106 kg (234 lb 6.4 oz)   SpO2 96%   BMI 29.30 kg/m²      Current Outpatient Medications   Medication Instructions    apixaban (Eliquis) 5 mg tablet 1 tablet, oral, Every 12 hours    aspirin 81 mg chewable tablet 1 tablet, oral, Daily    atorvastatin (LIPITOR) 40 mg, oral, Nightly    brimonidine (AlphaGAN P) 0.1 % ophthalmic solution ophthalmic (eye), 2 times daily    losartan-hydrochlorothiazide (Hyzaar) 100-25 mg tablet 1 tablet, oral, Daily    multivit with minerals/lutein (MULTIVITAMIN 50 PLUS ORAL) oral, Daily RT    netarsudiL-latanoprost (Rocklatan) 0.02-0.005 % drops ophthalmic (eye), Daily RT    tamsulosin (Flomax) 0.4 mg 24 hr capsule 1 capsule, oral, Daily    traMADol (Ultram) 50 mg tablet 1 tablet, oral, As needed    traZODone (DESYREL) 50 mg, oral, Nightly         Physical Exam  Vitals reviewed.   Cardiovascular:      Pulses: Normal pulses.      Heart sounds: Normal heart sounds.   Pulmonary:      Effort: Pulmonary effort is normal.      Breath sounds: Normal breath sounds.   Neurological:      Mental " Status: He is alert and oriented to person, place, and time.           Assessment & Plan  Primary insomnia    Orders:    traZODone (Desyrel) 50 mg tablet; Take 1 tablet (50 mg) by mouth once daily at bedtime.    Primary hypertension  Controlled today 118/68  Rx Losartan-hydrochlorothiazide 100-25 mg daily  Nonsmoker  BMI: 29       Chronic, continuous use of opioids  Rx Tramadol 50 mg as needed for shoulder pain  UDS today  CSA 2023     I have personally reviewed the OARRS report for this patient. This report is scanned into the electronic medical record. I have considered the risks of abuse, dependence, addiction and diversion. I believe that it is clinically appropriate for the patient to be prescribed this medication.  There are no discrepant prescriptions or evidence for abuse, misuse or diversion noted.  Orders:    POCT waived urine drug screen manually resulted    Encounter for therapeutic drug level monitoring  Rx Tramadol PRN chronic shoulder pain   Orders:    POCT waived urine drug screen manually resulted    High risk medication use  Rx Tramadol for chronic shoulder pain  UDS 7/2024 CSA 2023  Last Rx refill: Jan 2023  I have personally reviewed the OARRS report for this patient. This report is scanned into the electronic medical record. I have considered the risks of abuse, dependence, addiction and diversion. I believe that it is clinically appropriate for the patient to be prescribed this medication.  There are no discrepant prescriptions or evidence for abuse, misuse or diversion noted.       Heart failure, unspecified HF chronicity, unspecified heart failure type (Multi)  Condition stable based on symptoms and exam.    Continue current medications and follow-up at least yearly.         Dilated aortic root (CMS-HCC)  Condition stable based on symptoms and exam.    Continue current medications and follow-up at least yearly.

## 2024-07-12 NOTE — ASSESSMENT & PLAN NOTE
Rx Tramadol for chronic shoulder pain  UDS 7/2024  [facility] 2023  Last Rx refill: Jan 2023  I have personally reviewed the OARRS report for this patient. This report is scanned into the electronic medical record. I have considered the risks of abuse, dependence, addiction and diversion. I believe that it is clinically appropriate for the patient to be prescribed this medication.  There are no discrepant prescriptions or evidence for abuse, misuse or diversion noted.

## 2024-07-12 NOTE — PATIENT INSTRUCTIONS
Thank you for seeing me today.  It was a pleasure to see you again!    Continue medications as prescribed    UDS today    Refills sent    For assistance with scheduling referrals or consultations, please call [phone] or [phone].    For laboratory, radiology, and other tests, please call [phone] ([phone] for pediatrics).   If you do not get results within 7-10 days, or you have any questions or concerns, please send a message, call the office ([phone]), or return to the office for a follow-up appointment.     For acute/sick visits, if you are unable to get an office visit, you can do a  On Demand Virtual Visit that is accessible via your My Chart account.  For emergencies, call 9-1-1 or go to the nearest Emergency Department.     Please schedule additional appointment(s) to address concern(s) not addressed today.    Please review prescription inserts and published information for possible adverse effects of all medications.     In general, results are discussed over the phone or via  [employer]     You can see your health information, review clinical summaries from office visits & test results online when you follow your health with MY  Chart, a personal health record.   To sign up go to [email or web address].   If you need assistance with signing up or trouble getting into your account call [employer] Patient Line 24/7 at [phone]     RT 6 MONTHS AND AS NEEDED

## 2024-07-12 NOTE — ASSESSMENT & PLAN NOTE
Rx Tramadol 50 mg as needed for shoulder pain  UDS today  CSA 2023     I have personally reviewed the OARRS report for this patient. This report is scanned into the electronic medical record. I have considered the risks of abuse, dependence, addiction and diversion. I believe that it is clinically appropriate for the patient to be prescribed this medication.  There are no discrepant prescriptions or evidence for abuse, misuse or diversion noted.  Orders:    POCT waived urine drug screen manually resulted

## 2024-07-12 NOTE — ASSESSMENT & PLAN NOTE
Condition stable based on symptoms and exam.    Continue current medications and follow-up at least yearly.

## 2024-07-17 ENCOUNTER — APPOINTMENT (OUTPATIENT)
Dept: OTOLARYNGOLOGY | Facility: CLINIC | Age: 79
End: 2024-07-17
Payer: MEDICARE

## 2024-07-17 VITALS — HEIGHT: 75 IN | WEIGHT: 233 LBS | BODY MASS INDEX: 28.97 KG/M2 | TEMPERATURE: 96.6 F

## 2024-07-17 DIAGNOSIS — H61.23 BILATERAL IMPACTED CERUMEN: Primary | ICD-10-CM

## 2024-07-17 DIAGNOSIS — G47.33 OBSTRUCTIVE SLEEP APNEA: ICD-10-CM

## 2024-07-17 DIAGNOSIS — K21.9 LARYNGOPHARYNGEAL REFLUX: ICD-10-CM

## 2024-07-17 PROCEDURE — 1036F TOBACCO NON-USER: CPT | Performed by: OTOLARYNGOLOGY

## 2024-07-17 PROCEDURE — 99213 OFFICE O/P EST LOW 20 MIN: CPT | Performed by: OTOLARYNGOLOGY

## 2024-07-17 PROCEDURE — 1160F RVW MEDS BY RX/DR IN RCRD: CPT | Performed by: OTOLARYNGOLOGY

## 2024-07-17 PROCEDURE — 1123F ACP DISCUSS/DSCN MKR DOCD: CPT | Performed by: OTOLARYNGOLOGY

## 2024-07-17 PROCEDURE — 1159F MED LIST DOCD IN RCRD: CPT | Performed by: OTOLARYNGOLOGY

## 2024-07-17 NOTE — PROGRESS NOTES
HPI  Willy Reed is a 79 y.o. male prone to wax.  Requires cleaning every 6 months.  Wears bilateral hearing aids.  Has CPAP and uses faithfully.  Doing very well with this.  Here for 6-month cerumen management.  He has not having specific plugging, otalgia or otorrhea.  Today he has a new complaint of frequent throat clearing.  He rarely has nasal symptoms except in the morning when he first wakes up.  He is breathing well.  No hemoptysis.  No throat pain.  He does not have philippe heartburn      Past Medical History:   Diagnosis Date    Cerebral infarction, unspecified (Multi) 01/25/2022    CVA (cerebral vascular accident)    Essential (primary) hypertension 09/22/2022    HTN (hypertension)    Personal history of other diseases of the circulatory system 12/17/2019    History of atrial septal defect    Personal history of other diseases of the nervous system and sense organs     History of sleep apnea    Sleep apnea             Medications:     Current Outpatient Medications:     apixaban (Eliquis) 5 mg tablet, Take 1 tablet (5 mg) by mouth every 12 hours., Disp: , Rfl:     aspirin 81 mg chewable tablet, Chew 1 tablet (81 mg) once daily., Disp: , Rfl:     atorvastatin (Lipitor) 40 mg tablet, Take 1 tablet (40 mg) by mouth once daily at bedtime., Disp: 90 tablet, Rfl: 3    brimonidine (AlphaGAN P) 0.1 % ophthalmic solution, Administer into affected eye(s) twice a day., Disp: , Rfl:     losartan-hydrochlorothiazide (Hyzaar) 100-25 mg tablet, Take 1 tablet by mouth once daily., Disp: , Rfl:     multivit with minerals/lutein (MULTIVITAMIN 50 PLUS ORAL), Take by mouth once daily., Disp: , Rfl:     netarsudiL-latanoprost (Rocklatan) 0.02-0.005 % drops, Administer into affected eye(s) once daily., Disp: , Rfl:     tamsulosin (Flomax) 0.4 mg 24 hr capsule, Take 1 capsule (0.4 mg) by mouth once daily., Disp: , Rfl:     traMADol (Ultram) 50 mg tablet, Take 1 tablet (50 mg) by mouth if needed., Disp: , Rfl:     traZODone  "(Desyrel) 50 mg tablet, Take 1 tablet (50 mg) by mouth once daily at bedtime., Disp: 90 tablet, Rfl: 1     Allergies:  Allergies   Allergen Reactions    Morphine Nausea And Vomiting        Physical Exam:  Last Recorded Vitals  Temperature 35.9 °C (96.6 °F), height 1.905 m (6' 3\"), weight 106 kg (233 lb).  General:     General appearance: Well-developed, well-nourished in no acute distress.       Voice:  normal       Head/face: Normal appearance; nontender to palpation     Facial nerve function: Normal and symmetric bilaterally.    Oral/oropharynx:     Oral vestibule: Normal labial and gingival mucosa     Tongue/floor of mouth: Normal without lesion     Oropharynx: Clear.  No lesions present of the hard/soft palate, posterior pharynx    Neck:     Neck: Normal appearance, trachea midline     Salivary glands: Normal to palpation bilaterally     Lymph nodes: No cervical lymphadenopathy to palpation     Thyroid: No thyromegaly.  No palpable nodules     Range of motion: Normal    Neurological:     Cortical functions: Alert and oriented x3, appropriate affect       Larynx/hypopharynx:     Laryngeal findings: Mirror exam inadequate or limited secondary to enlarged base of tongue and/or excessive gagging.  Flexible laryngoscopy performed secondary to inadequate mirror examination.  Verbal informed consent the flexible laryngoscope was passed through the nasal cavity.  Normal epiglottis, aryepiglottic folds, arytenoids, false vocal cords, true vocal cords.  Normal vocal cord mobility bilaterally was identified.  No mucosal masses or lesions.    Nasopharynx:     Nasopharynx: Normal.  We discussed the  Ear:     Ear canal: Normal bilaterally after cleaning occlusive cerumen impaction bilaterally with microscope, suction, alligator     Tympanic membrane: Intact and mobile bilaterally     Pinna: Normal bilaterally     Hearing:  Gross hearing assessment normal by voice    Nose:     Visualized using: Anterior rhinoscopy     " Nasopharynx: Inadequate mirror exam secondary to gag, anatomy.       Nasal dorsum: Nontraumatic midline appearance     Septum: Midline     Inferior turbinates: Normally sized     Mucosa: Bilateral, pink, normal appearing       Assessment/Plan   Ear canals cleaned bilaterally.  Hearing aids were placed.  He will continue his hearing aids and follow-up with us in 6 months.  He will continue his CPAP in the meantime laryngoscopy findings and he does not have any sinister findings.  He may consider antireflux medication as a trial.  He may also tolerate the symptoms.  I will see him back in 6 months, sooner as needed         Con Martinez MD

## 2024-08-01 ENCOUNTER — APPOINTMENT (OUTPATIENT)
Dept: ORTHOPEDIC SURGERY | Facility: CLINIC | Age: 79
End: 2024-08-01
Payer: MEDICARE

## 2024-08-01 ENCOUNTER — HOSPITAL ENCOUNTER (OUTPATIENT)
Dept: RADIOLOGY | Facility: CLINIC | Age: 79
Discharge: HOME | End: 2024-08-01
Payer: MEDICARE

## 2024-08-01 DIAGNOSIS — M25.562 CHRONIC PAIN OF LEFT KNEE: ICD-10-CM

## 2024-08-01 DIAGNOSIS — G89.29 CHRONIC PAIN OF LEFT KNEE: ICD-10-CM

## 2024-08-01 PROCEDURE — 73564 X-RAY EXAM KNEE 4 OR MORE: CPT | Mod: LT

## 2024-08-01 PROCEDURE — 1036F TOBACCO NON-USER: CPT | Performed by: ORTHOPAEDIC SURGERY

## 2024-08-01 PROCEDURE — 99214 OFFICE O/P EST MOD 30 MIN: CPT | Performed by: ORTHOPAEDIC SURGERY

## 2024-08-01 PROCEDURE — 1160F RVW MEDS BY RX/DR IN RCRD: CPT | Performed by: ORTHOPAEDIC SURGERY

## 2024-08-01 PROCEDURE — 1123F ACP DISCUSS/DSCN MKR DOCD: CPT | Performed by: ORTHOPAEDIC SURGERY

## 2024-08-01 PROCEDURE — 20610 DRAIN/INJ JOINT/BURSA W/O US: CPT | Performed by: ORTHOPAEDIC SURGERY

## 2024-08-01 PROCEDURE — 73564 X-RAY EXAM KNEE 4 OR MORE: CPT | Mod: LEFT SIDE | Performed by: RADIOLOGY

## 2024-08-01 PROCEDURE — 1159F MED LIST DOCD IN RCRD: CPT | Performed by: ORTHOPAEDIC SURGERY

## 2024-08-01 RX ORDER — TRIAMCINOLONE ACETONIDE 40 MG/ML
1 INJECTION, SUSPENSION INTRA-ARTICULAR; INTRAMUSCULAR
Status: COMPLETED | OUTPATIENT
Start: 2024-08-01 | End: 2024-08-01

## 2024-08-01 ASSESSMENT — PAIN SCALES - GENERAL: PAINLEVEL_OUTOF10: 0 - NO PAIN

## 2024-08-01 ASSESSMENT — PAIN - FUNCTIONAL ASSESSMENT: PAIN_FUNCTIONAL_ASSESSMENT: 0-10

## 2024-08-01 NOTE — PROGRESS NOTES
This is a consultation from CHING Mancilla-CNP for   Chief Complaint   Patient presents with    Left Knee - Pain       This is a 79 y.o. male who presents for for follow-up for his left knee.  Patient had a cortisone injection about 8 months ago.  It was effective and lasted for quite a while.  Complains of return of symptoms exacerbation of his pain over the last 1 to 2 weeks.  Sharp pain over the medial knee worse with walking.  He does not use a cane or brace.  No numbness or tingling no fevers or chills no shooting pain down the leg.    Physical Exam    There has been no interval change in this patient's past medical, surgical, medications, allergies, family history or social history since the most recent visit to a provider within our department. 14 point review of systems was performed, reviewed, and negative except for pertinent positives documented in the history of present illness.     Constitutional: well developed, well nourished male in no acute distress  Psychiatric: normal mood, appropriate affect  Eyes: sclera anicteric  HENT: normocephalic/atraumatic  CV: regular rate and rhythm   Respiratory: non labored breathing  Integumentary: no rash  Neurological: moves all extremities    Left knee exam: skin intact no lacerations or abrations. no effusion.  Tender medial joint line. negative log roll negative patellar grind. ROM 0-120. stable to varus and valgus stress at 0 and 30 degrees. negative lachman negative posterior drawer negative kathleen. 5/5 ehl/fhl/gs/ta. silt s/s/sp/dp/t. 2+ dp/pt        Xrays were ordered by me, they were reviewed and independently interpreted by me today, they show severe degenerative changes bone-on-bone arthritis in the left knee    L Inj/Asp: L knee on 8/1/2024 9:59 AM  Indications: pain and joint swelling  Details: 22 G needle, anterolateral approach  Medications: 1 mL triamcinolone acetonide 40 mg/mL    Discussion:  I discussed the conservative treatment  options for knee osteoarthritis including but not limited to physical therapy, oral NSAIDS, activity and lifestyle modification, and corticosteroid injections. Pt has elected to undergo a cortisone injection today. I have explained the risk and benefits of an injection including the possibility of joint infection, bleeding, damage to cartilage, allergic reaction. Patient verbalized understanding and gave verbal consent wishes to proceed with a intra-articular cortisone injection for their knee.    Procedure:  After discussing the risk and benefits of the procedure, we proceeded with an intra-articular left knee injection. We discussed the risks and benefits and potential morbidity related to the treatment, and to the prescription medication administered in the injection    With the patient's informed verbal consent, the left knee was prepped in standard sterile fashion with Chlorhexidine. The skin was then anesthetized with ethyl chloride spray and cleaned again with Chlorhexidine. The knee was then apirated/injected with a prefilled 20-gauge syringe of 40 mg Kenalog + 4 ml Lidocaine using the lateral approach without complications.  The patient tolerated this well and felt immediate initial relief of symptoms. A bandaid was applied and the patient ambulated out of the clinic on ther own accord without difficulty. Patient was instructed to avoid physical activity for 24-48 hours to prevent the knees from swelling and may ice the knees as tolerated. Patient should contact the office if any signs of of infection appear: redness, fever, chills, drainage, swelling or warmth to the knees.  Pt understands that the injections can be repeated no sooner than 3 months.  Procedure, treatment alternatives, risks and benefits explained, specific risks discussed. Consent was given by the patient. Immediately prior to procedure a time out was called to verify the correct patient, procedure, equipment, support staff and site/side  "marked as required. Patient was prepped and draped in the usual sterile fashion.             Impression/Plan: This is a 79 y.o. male with severe left knee arthritis.  I had an in depth discussion with the patient regarding treatment options for arthritis and their relative risks and benefits. We reviewed surgical and nonsurgical option for treatment. Treatments include anti inflammatory medications, physical therapy, weight loss, activity modification, use of assistive devices, injection therapies. We discussed current prescriptions and risks and benefits of continuation of prescription medication as apporpriate. We discussed that arthritis is often progressive over time, an in end stage arthritis surgical interventions can be considered, including arthroplasty. All questions were answered and the patient voiced their understanding.  Continue nonsurgical management, patient is not interested in surgery.    BMI Readings from Last 1 Encounters:   07/17/24 29.12 kg/m²      Lab Results   Component Value Date    CREATININE 1.02 02/14/2023     Tobacco Use: Low Risk  (8/1/2024)    Patient History     Smoking Tobacco Use: Never     Smokeless Tobacco Use: Never     Passive Exposure: Never      Computed MELD 3.0 unavailable. One or more values for this score either were not found within the given timeframe or did not fit some other criterion.  Computed MELD-Na unavailable. One or more values for this score either were not found within the given timeframe or did not fit some other criterion.       Lab Results   Component Value Date    HGBA1C 6.3 (A) 01/25/2022     No results found for: \"STAPHMRSASCR\"  "

## 2024-09-11 ENCOUNTER — TELEPHONE (OUTPATIENT)
Dept: PRIMARY CARE | Facility: CLINIC | Age: 79
End: 2024-09-11
Payer: MEDICARE

## 2024-09-11 ENCOUNTER — OFFICE VISIT (OUTPATIENT)
Dept: URGENT CARE | Age: 79
End: 2024-09-11
Payer: MEDICARE

## 2024-09-11 VITALS
RESPIRATION RATE: 16 BRPM | HEIGHT: 76 IN | WEIGHT: 235 LBS | DIASTOLIC BLOOD PRESSURE: 76 MMHG | SYSTOLIC BLOOD PRESSURE: 122 MMHG | OXYGEN SATURATION: 97 % | HEART RATE: 63 BPM | BODY MASS INDEX: 28.62 KG/M2 | TEMPERATURE: 97.9 F

## 2024-09-11 DIAGNOSIS — M53.86 SCIATICA ASSOCIATED WITH DISORDER OF LUMBAR SPINE: Primary | ICD-10-CM

## 2024-09-11 DIAGNOSIS — M54.41 ACUTE BILATERAL LOW BACK PAIN WITH BILATERAL SCIATICA: ICD-10-CM

## 2024-09-11 DIAGNOSIS — M54.42 ACUTE BILATERAL LOW BACK PAIN WITH BILATERAL SCIATICA: ICD-10-CM

## 2024-09-11 RX ORDER — KETOROLAC TROMETHAMINE 30 MG/ML
30 INJECTION, SOLUTION INTRAMUSCULAR; INTRAVENOUS ONCE
Status: COMPLETED | OUTPATIENT
Start: 2024-09-11 | End: 2024-09-11

## 2024-09-11 RX ORDER — PREDNISONE 20 MG/1
20 TABLET ORAL 2 TIMES DAILY
Qty: 10 TABLET | Refills: 0 | Status: SHIPPED | OUTPATIENT
Start: 2024-09-11 | End: 2024-09-16

## 2024-09-11 RX ORDER — PREDNISONE 20 MG/1
TABLET ORAL
Qty: 30 TABLET | Refills: 0 | Status: SHIPPED | OUTPATIENT
Start: 2024-09-11 | End: 2024-09-11

## 2024-09-11 RX ORDER — CYCLOBENZAPRINE HCL 10 MG
10 TABLET ORAL 2 TIMES DAILY PRN
Qty: 28 TABLET | Refills: 0 | Status: SHIPPED | OUTPATIENT
Start: 2024-09-11 | End: 2024-09-25

## 2024-09-11 RX ORDER — KETOROLAC TROMETHAMINE 30 MG/ML
30 INJECTION, SOLUTION INTRAMUSCULAR; INTRAVENOUS ONCE
Status: DISCONTINUED | OUTPATIENT
Start: 2024-09-11 | End: 2024-09-11

## 2024-09-11 RX ORDER — CYCLOBENZAPRINE HCL 10 MG
10 TABLET ORAL 2 TIMES DAILY PRN
Qty: 28 TABLET | Refills: 0 | Status: SHIPPED | OUTPATIENT
Start: 2024-09-11 | End: 2024-09-11

## 2024-09-11 RX ORDER — DEXAMETHASONE SODIUM PHOSPHATE 10 MG/ML
10 INJECTION INTRAMUSCULAR; INTRAVENOUS ONCE
Status: COMPLETED | OUTPATIENT
Start: 2024-09-11 | End: 2024-09-11

## 2024-09-11 ASSESSMENT — ENCOUNTER SYMPTOMS
CARDIOVASCULAR NEGATIVE: 1
ALLERGIC/IMMUNOLOGIC NEGATIVE: 1
EYES NEGATIVE: 1
PSYCHIATRIC NEGATIVE: 1
HEMATOLOGIC/LYMPHATIC NEGATIVE: 1
GASTROINTESTINAL NEGATIVE: 1
CONSTITUTIONAL NEGATIVE: 1
BACK PAIN: 1
RESPIRATORY NEGATIVE: 1
ENDOCRINE NEGATIVE: 1

## 2024-09-11 ASSESSMENT — PAIN SCALES - GENERAL: PAINLEVEL: 9

## 2024-09-11 NOTE — PROGRESS NOTES
Subjective   Patient ID: Willy Reed is a 79 y.o. male. They present today with a chief complaint of Back Pain (5 days).    History of Present Illness    Back Pain  This is a new problem. The current episode started in the past 7 days. The problem occurs 2 to 4 times per day. The problem is unchanged. The pain is present in the lumbar spine. The quality of the pain is described as aching, burning, cramping and shooting. The pain is Worse during the night. He has tried analgesics for the symptoms. The treatment provided mild relief.       Past Medical History  Allergies as of 09/11/2024 - Reviewed 09/11/2024   Allergen Reaction Noted    Morphine Nausea And Vomiting 05/25/2013       (Not in a hospital admission)       Past Medical History:   Diagnosis Date    Cerebral infarction, unspecified (Multi) 01/25/2022    CVA (cerebral vascular accident)    Essential (primary) hypertension 09/22/2022    HTN (hypertension)    Personal history of other diseases of the circulatory system 12/17/2019    History of atrial septal defect    Personal history of other diseases of the nervous system and sense organs     History of sleep apnea    Sleep apnea        Past Surgical History:   Procedure Laterality Date    CORONARY ARTERY BYPASS GRAFT  09/01/2016    [facility]    MR HEAD ANGIO WO IV CONTRAST  6/28/2016    MR HEAD ANGIO WO IV CONTRAST 6/28/2016 [facility]    MR HEAD ANGIO WO IV CONTRAST  1/19/2021    MR HEAD ANGIO WO IV CONTRAST [facility]    MR NECK ANGIO WO IV CONTRAST  6/28/2016    MR NECK ANGIO WO IV CONTRAST 6/28/2016 [facility]    MR NECK ANGIO WO IV CONTRAST  1/20/2021    MR NECK ANGIO WO IV CONTRAST [facility]    OTHER SURGICAL HISTORY  06/25/2013    Injection Of Neurolytic Substance Epidural Thoracic    OTHER SURGICAL HISTORY  07/25/2022    Heart surgery    OTHER SURGICAL HISTORY  07/25/2022    Knee surgery    OTHER SURGICAL HISTORY  09/01/2016    Shoulder Surgery Left    SHOULDER SURGERY   "09/01/2016    Shoulder Surgery Right        reports that he has never smoked. He has never been exposed to tobacco smoke. He has never used smokeless tobacco. He reports that he does not currently use alcohol. He reports that he does not use drugs.    Review of Systems  Review of Systems   Constitutional: Negative.    HENT: Negative.     Eyes: Negative.    Respiratory: Negative.     Cardiovascular: Negative.    Gastrointestinal: Negative.    Endocrine: Negative.    Genitourinary: Negative.    Musculoskeletal:  Positive for back pain and gait problem.   Allergic/Immunologic: Negative.    Hematological: Negative.    Psychiatric/Behavioral: Negative.     All other systems reviewed and are negative.                                 Objective    Vitals:    09/11/24 1525   BP: 122/76   BP Location: Right arm   Patient Position: Sitting   Pulse: 63   Resp: 16   Temp: 36.6 °C (97.9 °F)   TempSrc: Oral   SpO2: 97%   Weight: 107 kg (235 lb)   Height: 1.93 m (6' 4\")     No LMP for male patient.    Physical Exam  Vitals and nursing note reviewed.   Constitutional:       Appearance: Normal appearance. He is obese.   HENT:      Head: Normocephalic.   Cardiovascular:      Rate and Rhythm: Normal rate and regular rhythm.      Pulses: Normal pulses.      Heart sounds: Normal heart sounds.   Pulmonary:      Effort: Pulmonary effort is normal.   Abdominal:      General: Abdomen is flat.   Musculoskeletal:         General: Normal range of motion.      Cervical back: Normal range of motion and neck supple.   Skin:     General: Skin is warm and dry.   Neurological:      General: No focal deficit present.      Mental Status: He is alert and oriented to person, place, and time.      Coordination: Coordination abnormal.      Gait: Gait abnormal.      Deep Tendon Reflexes: Reflexes abnormal.         Procedures    Point of Care Test & Imaging Results from this visit  Results for orders placed or performed in visit on 07/12/24   POCT waived " urine drug screen manually resulted   Result Value Ref Range    POC THC Negative Negative, Not applicable ng/mL    POC Cocaine Negative Negative, Not applicable ng/mL    POC Opiates Negative Negative, Not applicable ng/mL    POC Amphetamine Negative Negative, Not applicable ng/mL    POC Phencyclidine (PCP) Negative Negative, Not applicable ng/mL    POC Barbiturates Negative Negative, Not applicable ng/mL    POC Benzodiazepines Negative Negative, Not applicable ng/mL    POC Methamphetamine Negative Negative, Not applicable ng/mL    POC METHADONE MANUALLY ENTERED Negative Negative, Not applicable ng/mL    POC Ticyclic Antidepressants (TCA) Negative Negative, Not applicable ng/mL    POC Oxycodone Negative Negative, Not applicable ng/mL    POC MDMA URINE Negative Negative, Not applicable ng/mL    POC Morphine Urine Negative Negative, Not applicable ng/mL    POC Burprenorphine Urine Negative Negative, Not applicable ng/mL     No results found.    Diagnostic study results (if any) were reviewed by MYNOR Hodges.    Assessment/Plan   Allergies, medications, history, and pertinent labs/EKGs/Imaging reviewed by MYNOR Hodges.     Medical Decision Making      Orders and Diagnoses  Diagnoses and all orders for this visit:  Sciatica associated with disorder of lumbar spine  -     dexAMETHasone (Decadron) injection 10 mg  -     ketorolac (Toradol) injection 30 mg  -     predniSONE (Deltasone) 20 mg tablet; Take 1 tablet (20 mg) by mouth 3 times a day for 5 days, THEN 1 tablet (20 mg) 2 times a day for 5 days, THEN 1 tablet (20 mg) once daily for 5 days.  -     cyclobenzaprine (Flexeril) 10 mg tablet; Take 1 tablet (10 mg) by mouth 2 times a day as needed for muscle spasms for up to 14 days.  Acute bilateral low back pain with bilateral sciatica  -     dexAMETHasone (Decadron) injection 10 mg  -     ketorolac (Toradol) injection 30 mg  -     predniSONE (Deltasone) 20 mg tablet; Take 1 tablet (20 mg) by  mouth 3 times a day for 5 days, THEN 1 tablet (20 mg) 2 times a day for 5 days, THEN 1 tablet (20 mg) once daily for 5 days.  -     cyclobenzaprine (Flexeril) 10 mg tablet; Take 1 tablet (10 mg) by mouth 2 times a day as needed for muscle spasms for up to 14 days.      Medical Admin Record  Administrations This Visit       dexAMETHasone (Decadron) injection 10 mg       Admin Date  09/11/2024 Action  Given Dose  10 mg Route  intramuscular Documented By  Sanjuanita Pham MA              ketorolac (Toradol) injection 30 mg       Admin Date  09/11/2024 Action  Given Dose  30 mg Route  intramuscular Documented By  Sanjuanita hPam MA                    Follow Up Instructions  No follow-ups on file.    Patient disposition: Home    Electronically signed by MYNOR Hodges  4:04 PM

## 2024-09-11 NOTE — TELEPHONE ENCOUNTER
----- Message from Francie ORANTES sent at 9/11/2024  1:25 PM EDT -----  Regarding: RE: [email or web address]  I spoke w/pt and offered him the 17t w/Glory or sooner w/another provider or Urgent Care. Pt decided he would go to . Thank you!    MARY Gallegos came to the back and I advised her to give patient the following options: the 17th w/Glory or sooner w/another provider or Urgent Care        MARY Quinones doesn't have any openings until next Tuesday which are SDS. Pt sounds like he's in a lot of pain. Do you want me to add him on somewhere today or have him see another provider?    ----- Message -----  From: Araseli Kelley MA  Sent: 9/11/2024  11:15 AM EDT  To: Francie Granda  Subject: [email or web address]                                     Please call patient and get him scheduled for back pain

## 2024-09-19 ENCOUNTER — OFFICE VISIT (OUTPATIENT)
Dept: URGENT CARE | Age: 79
End: 2024-09-19
Payer: MEDICARE

## 2024-09-19 ENCOUNTER — HOSPITAL ENCOUNTER (OUTPATIENT)
Dept: RADIOLOGY | Facility: CLINIC | Age: 79
Discharge: HOME | End: 2024-09-19
Payer: MEDICARE

## 2024-09-19 VITALS
HEART RATE: 65 BPM | BODY MASS INDEX: 28.61 KG/M2 | SYSTOLIC BLOOD PRESSURE: 161 MMHG | RESPIRATION RATE: 18 BRPM | WEIGHT: 235 LBS | DIASTOLIC BLOOD PRESSURE: 73 MMHG | TEMPERATURE: 97.7 F | OXYGEN SATURATION: 97 %

## 2024-09-19 DIAGNOSIS — M25.551 RIGHT HIP PAIN: Primary | ICD-10-CM

## 2024-09-19 DIAGNOSIS — M53.86 SCIATICA ASSOCIATED WITH DISORDER OF LUMBAR SPINE: ICD-10-CM

## 2024-09-19 DIAGNOSIS — M25.551 RIGHT HIP PAIN: ICD-10-CM

## 2024-09-19 PROCEDURE — 72110 X-RAY EXAM L-2 SPINE 4/>VWS: CPT

## 2024-09-19 PROCEDURE — 73502 X-RAY EXAM HIP UNI 2-3 VIEWS: CPT | Mod: RT

## 2024-09-19 PROCEDURE — 73502 X-RAY EXAM HIP UNI 2-3 VIEWS: CPT | Mod: RIGHT SIDE | Performed by: RADIOLOGY

## 2024-09-19 PROCEDURE — 72110 X-RAY EXAM L-2 SPINE 4/>VWS: CPT | Mod: RIGHT SIDE | Performed by: RADIOLOGY

## 2024-09-19 RX ORDER — KETOROLAC TROMETHAMINE 30 MG/ML
30 INJECTION, SOLUTION INTRAMUSCULAR; INTRAVENOUS ONCE
Status: COMPLETED | OUTPATIENT
Start: 2024-09-19 | End: 2024-09-19

## 2024-09-19 RX ORDER — DEXAMETHASONE SODIUM PHOSPHATE 10 MG/ML
10 INJECTION INTRAMUSCULAR; INTRAVENOUS ONCE
Status: COMPLETED | OUTPATIENT
Start: 2024-09-19 | End: 2024-09-19

## 2024-09-19 RX ORDER — TRAMADOL HYDROCHLORIDE 50 MG/1
50 TABLET ORAL EVERY 8 HOURS PRN
Qty: 10 TABLET | Refills: 0 | Status: SHIPPED | OUTPATIENT
Start: 2024-09-19 | End: 2024-09-24

## 2024-09-19 ASSESSMENT — ENCOUNTER SYMPTOMS
ALLERGIC/IMMUNOLOGIC NEGATIVE: 1
BACK PAIN: 1
HEMATOLOGIC/LYMPHATIC NEGATIVE: 1
MYALGIAS: 1
PSYCHIATRIC NEGATIVE: 1
RESPIRATORY NEGATIVE: 1
CONSTITUTIONAL NEGATIVE: 1
GASTROINTESTINAL NEGATIVE: 1
CARDIOVASCULAR NEGATIVE: 1

## 2024-09-19 NOTE — PROGRESS NOTES
Subjective   Patient ID: Willy Reed is a 79 y.o. male. They present today with a chief complaint of Back Pain (Patient has lower back pain and sciatic pain x week).    History of Present Illness  HPI    Past Medical History  Allergies as of 09/19/2024 - Reviewed 09/19/2024   Allergen Reaction Noted    Morphine Nausea And Vomiting 05/25/2013       (Not in a hospital admission)       Past Medical History:   Diagnosis Date    Cerebral infarction, unspecified (Multi) 01/25/2022    CVA (cerebral vascular accident)    Essential (primary) hypertension 09/22/2022    HTN (hypertension)    Personal history of other diseases of the circulatory system 12/17/2019    History of atrial septal defect    Personal history of other diseases of the nervous system and sense organs     History of sleep apnea    Sleep apnea        Past Surgical History:   Procedure Laterality Date    CORONARY ARTERY BYPASS GRAFT  09/01/2016    [facility]    MR HEAD ANGIO WO IV CONTRAST  6/28/2016    MR HEAD ANGIO WO IV CONTRAST 6/28/2016 [facility]    MR HEAD ANGIO WO IV CONTRAST  1/19/2021    MR HEAD ANGIO WO IV CONTRAST [facility]    MR NECK ANGIO WO IV CONTRAST  6/28/2016    MR NECK ANGIO WO IV CONTRAST 6/28/2016 [facility]    MR NECK ANGIO WO IV CONTRAST  1/20/2021    MR NECK ANGIO WO IV CONTRAST [facility]    OTHER SURGICAL HISTORY  06/25/2013    Injection Of Neurolytic Substance Epidural Thoracic    OTHER SURGICAL HISTORY  07/25/2022    Heart surgery    OTHER SURGICAL HISTORY  07/25/2022    Knee surgery    OTHER SURGICAL HISTORY  09/01/2016    Shoulder Surgery Left    SHOULDER SURGERY  09/01/2016    Shoulder Surgery Right        reports that he has never smoked. He has never been exposed to tobacco smoke. He has never used smokeless tobacco. He reports that he does not currently use alcohol. He reports that he does not use drugs.    Review of Systems  Review of Systems                               Objective    Vitals:     09/19/24 0845   BP: 161/73   Pulse: 65   Resp: 18   Temp: 36.5 °C (97.7 °F)   SpO2: 97%   Weight: 107 kg (235 lb)     No LMP for male patient.    Physical Exam    Procedures    Point of Care Test & Imaging Results from this visit  No results found for this visit on 09/19/24.   No results found.    Diagnostic study results (if any) were reviewed by MYNOR Hodges.    Assessment/Plan   Allergies, medications, history, and pertinent labs/EKGs/Imaging reviewed by MYNOR Hodges.     Medical Decision Making  ***    Orders and Diagnoses  Diagnoses and all orders for this visit:  Right hip pain  -     dexAMETHasone (Decadron) injection 10 mg  -     ketorolac (Toradol) injection 30 mg  -     XR lumbar spine complete 4+ views; Future  -     XR hip right with pelvis when performed 2 or 3 views; Future  Sciatica associated with disorder of lumbar spine  -     dexAMETHasone (Decadron) injection 10 mg  -     ketorolac (Toradol) injection 30 mg  -     XR lumbar spine complete 4+ views; Future  -     XR hip right with pelvis when performed 2 or 3 views; Future      Medical Admin Record  Administrations This Visit       dexAMETHasone (Decadron) injection 10 mg       Admin Date  09/19/2024 Action  Given Dose  10 mg Route  intramuscular Documented By  Sade Villegas MA              ketorolac (Toradol) injection 30 mg       Admin Date  09/19/2024 Action  Given Dose  30 mg Route  intramuscular Documented By  Sade Villegas MA                    Patient disposition: { Disposition:[id number]}    Electronically signed by MYNOR Hodges  9:36 AM

## 2024-09-19 NOTE — PROGRESS NOTES
Subjective   Patient ID: Willy Reed is a 79 y.o. male. They present today with a chief complaint of Back Pain (Patient has lower back pain and sciatic pain x week).    History of Present Illness    Back Pain  This is a chronic problem. The current episode started 1 to 4 weeks ago. The problem occurs intermittently. The problem has been waxing and waning since onset. The pain is present in the lumbar spine. The quality of the pain is described as stabbing. The pain is The same all the time. He has tried NSAIDs for the symptoms. The treatment provided no relief.       Past Medical History  Allergies as of 09/19/2024 - Reviewed 09/19/2024   Allergen Reaction Noted    Morphine Nausea And Vomiting 05/25/2013       (Not in a hospital admission)       Past Medical History:   Diagnosis Date    Cerebral infarction, unspecified (Multi) 01/25/2022    CVA (cerebral vascular accident)    Essential (primary) hypertension 09/22/2022    HTN (hypertension)    Personal history of other diseases of the circulatory system 12/17/2019    History of atrial septal defect    Personal history of other diseases of the nervous system and sense organs     History of sleep apnea    Sleep apnea        Past Surgical History:   Procedure Laterality Date    CORONARY ARTERY BYPASS GRAFT  09/01/2016    [facility]    MR HEAD ANGIO WO IV CONTRAST  6/28/2016    MR HEAD ANGIO WO IV CONTRAST 6/28/2016 [facility]    MR HEAD ANGIO WO IV CONTRAST  1/19/2021    MR HEAD ANGIO WO IV CONTRAST [facility]    MR NECK ANGIO WO IV CONTRAST  6/28/2016    MR NECK ANGIO WO IV CONTRAST 6/28/2016 [facility]    MR NECK ANGIO WO IV CONTRAST  1/20/2021    MR NECK ANGIO WO IV CONTRAST [facility]    OTHER SURGICAL HISTORY  06/25/2013    Injection Of Neurolytic Substance Epidural Thoracic    OTHER SURGICAL HISTORY  07/25/2022    Heart surgery    OTHER SURGICAL HISTORY  07/25/2022    Knee surgery    OTHER SURGICAL HISTORY  09/01/2016    Shoulder Surgery  Left    SHOULDER SURGERY  09/01/2016    Shoulder Surgery Right        reports that he has never smoked. He has never been exposed to tobacco smoke. He has never used smokeless tobacco. He reports that he does not currently use alcohol. He reports that he does not use drugs.    Review of Systems  Review of Systems   Constitutional: Negative.    HENT: Negative.     Respiratory: Negative.     Cardiovascular: Negative.    Gastrointestinal: Negative.    Genitourinary: Negative.    Musculoskeletal:  Positive for back pain, gait problem and myalgias.   Skin: Negative.    Allergic/Immunologic: Negative.    Hematological: Negative.    Psychiatric/Behavioral: Negative.     All other systems reviewed and are negative.                                 Objective    Vitals:    09/19/24 0845   BP: 161/73   Pulse: 65   Resp: 18   Temp: 36.5 °C (97.7 °F)   SpO2: 97%   Weight: 107 kg (235 lb)     No LMP for male patient.    Physical Exam  Vitals and nursing note reviewed.   Constitutional:       Appearance: He is normal weight.   HENT:      Head: Normocephalic and atraumatic.   Cardiovascular:      Rate and Rhythm: Normal rate and regular rhythm.      Pulses: Normal pulses.   Abdominal:      General: Abdomen is flat.      Palpations: Abdomen is soft.   Musculoskeletal:         General: Swelling and tenderness present.      Cervical back: Normal range of motion.   Skin:     Capillary Refill: Capillary refill takes less than 2 seconds.   Neurological:      General: No focal deficit present.      Mental Status: He is alert.   Psychiatric:         Mood and Affect: Mood normal.         Behavior: Behavior normal.         Procedures    Point of Care Test & Imaging Results from this visit  No results found for this visit on 09/19/24.   No results found.    Diagnostic study results (if any) were reviewed by CHING Hodges-LEO.    Assessment/Plan   Allergies, medications, history, and pertinent labs/EKGs/Imaging reviewed by Erum  M Knez, APRN-CNP.       Orders and Diagnoses  Diagnoses and all orders for this visit:  Right hip pain  -     dexAMETHasone (Decadron) injection 10 mg  -     ketorolac (Toradol) injection 30 mg  -     XR lumbar spine complete 4+ views; Future  -     XR hip right with pelvis when performed 2 or 3 views; Future  -     Referral to Orthopaedic Surgery; Future  Sciatica associated with disorder of lumbar spine  -     dexAMETHasone (Decadron) injection 10 mg  -     ketorolac (Toradol) injection 30 mg  -     XR lumbar spine complete 4+ views; Future  -     XR hip right with pelvis when performed 2 or 3 views; Future  -     Referral to Orthopaedic Surgery; Future    Ultram 50 mg q 6 hours as needed--[facility] reviewed... patient was seen 9/11/2024 and states pain improved, but came back and is debilitating, imaging obtained and referral to ortho made.  Short term ultram given.  Patient has been on ultram in past and has been effective.   Medical Admin Record  Administrations This Visit       dexAMETHasone (Decadron) injection 10 mg       Admin Date  09/19/2024 Action  Given Dose  10 mg Route  intramuscular Documented By  Sade Villegas MA              ketorolac (Toradol) injection 30 mg       Admin Date  09/19/2024 Action  Given Dose  30 mg Route  intramuscular Documented By  Sade Villegas MA                    Patient disposition: Home    Electronically signed by MYNOR Hodges  9:39 AM

## 2024-09-23 NOTE — PROGRESS NOTES
"Subjective   Chief Complaint   Patient presents with    Follow-up     Patient is coming in today for a follow up from urgent care on 9/11/2024 and 9/19/24 with a complaint of back and hip pain. The urgent care did X-Rays and gave dexAMETHasone (Decadron) injection 10 mg, ketorolac (Toradol) injection 30 mg. They had also put in an order for Ortho. Would like to review his X-Rays UC didn't tell him anything about them. Still in a lot of pain.  161/80       Patient ID: Willy Reed \"Kisha" is a 79 y.o. male who presents for Follow-up (Patient is coming in today for a follow up from urgent care on 9/11/2024 and 9/19/24 with a complaint of back and hip pain. The urgent care did X-Rays and gave dexAMETHasone (Decadron) injection 10 mg, ketorolac (Toradol) injection 30 mg. They had also put in an order for Ortho. Would like to review his X-Rays UC didn't tell him anything about them. Still in a lot of pain./161/80).    HPI    Willy \"Kisha" is an 78 yo est male presenting today for f/u from UC visits on 9/11/2024 and 9/19/24 for c/o RIGHT sided LBP radiating into his RIGHT groin     Pt states he woke up on 9/11/2024 and his back hurt  He denies any injury or trauma     Pt denies loss of bowel or bladder  Pt denies any leg pain/numbness or tingling     Pt had a few tramadol for pain, but they didn't help  Pt has been taking Tylenol which isn't helping much   Pt has Flexeril which he states doesn't help the pain so he isn't taking it.    He has an appt on 9/30/2024 with Ortho     Pt is moaning in pain while sitting in chair, but was able to get up out of the chair and onto the exam table without any issues.       Allergies   Allergen Reactions    Morphine Nausea And Vomiting       Review of Systems  ROS was completed and all systems are negative with the exception of what was noted in the the HPI.       Objective   Pulse 69   Ht 1.93 m (6' 4\")   Wt 106 kg (234 lb 6.4 oz)   SpO2 96%   BMI 28.53 kg/m²      Current " Outpatient Medications   Medication Instructions    acetaminophen (TYLENOL 8 HOUR) 1,300 mg, 3 times daily PRN    apixaban (Eliquis) 5 mg tablet 1 tablet, Every 12 hours    aspirin 81 mg chewable tablet 1 tablet, Daily    atorvastatin (LIPITOR) 40 mg, oral, Nightly    brimonidine (AlphaGAN P) 0.1 % ophthalmic solution 2 times daily    cefuroxime (CEFTIN) 500 mg, oral, 2 times daily    furosemide (LASIX) 10 mg, oral, 2 times daily (morning and late afternoon)    gabapentin (NEURONTIN) 600 mg, oral, 3 times daily    LORazepam (Ativan) 1 mg tablet Take 1 tab, 1 hour prior to your MRI.    losartan-hydrochlorothiazide (Hyzaar) 100-25 mg tablet 1 tablet, Daily    methylPREDNISolone (Medrol Dospak) 4 mg tablets Follow schedule on package instructions    multivit with minerals/lutein (MULTIVITAMIN 50 PLUS ORAL) Daily RT    netarsudiL-latanoprost (Rocklatan) 0.02-0.005 % drops Daily RT    omeprazole OTC (PRILOSEC OTC) 20 mg, oral, Daily before breakfast, Do not crush, chew, or split.    oxyCODONE (ROXICODONE) 5 mg, oral, Every 4 hours    tamsulosin (Flomax) 0.4 mg 24 hr capsule 1 capsule, Daily    traZODone (DESYREL) 50 mg, oral, Nightly         Physical Exam  Musculoskeletal:        Back:         Legs:    Neurological:      Mental Status: He is alert.         Assessment & Plan  Acute right-sided low back pain without sciatica  Ambulate as tolerated and no heavy lifting.   Rx Prednisone taper   Take Tylenol 1300 mg every 8 hours for pain with food, NO ADVIL, IBUPROFEN, MOTRIN OR ALEVE   Flexeril every 8 hours as needed for muscle spasm- do not drive or make any important decisions while on this medication for it can make you drowsy.   Ice to low back x 20 min 3-4 times per day   Low back stretches          Hemiplegia and hemiparesis following cerebral infarction affecting right dominant side (Multi)  Condition stable based on symptoms and exam.    Continue current medications and follow-up at least yearly.

## 2024-09-24 ENCOUNTER — OFFICE VISIT (OUTPATIENT)
Dept: PRIMARY CARE | Facility: CLINIC | Age: 79
End: 2024-09-24
Payer: MEDICARE

## 2024-09-24 VITALS — BODY MASS INDEX: 28.54 KG/M2 | WEIGHT: 234.4 LBS | HEIGHT: 76 IN | OXYGEN SATURATION: 96 % | HEART RATE: 69 BPM

## 2024-09-24 DIAGNOSIS — M54.50 ACUTE RIGHT-SIDED LOW BACK PAIN WITHOUT SCIATICA: Primary | ICD-10-CM

## 2024-09-24 PROCEDURE — 1160F RVW MEDS BY RX/DR IN RCRD: CPT | Performed by: NURSE PRACTITIONER

## 2024-09-24 PROCEDURE — 1123F ACP DISCUSS/DSCN MKR DOCD: CPT | Performed by: NURSE PRACTITIONER

## 2024-09-24 PROCEDURE — 1036F TOBACCO NON-USER: CPT | Performed by: NURSE PRACTITIONER

## 2024-09-24 PROCEDURE — 1159F MED LIST DOCD IN RCRD: CPT | Performed by: NURSE PRACTITIONER

## 2024-09-24 PROCEDURE — 99213 OFFICE O/P EST LOW 20 MIN: CPT | Performed by: NURSE PRACTITIONER

## 2024-09-24 RX ORDER — PREDNISONE 20 MG/1
TABLET ORAL
Qty: 21 TABLET | Refills: 0 | Status: SHIPPED | OUTPATIENT
Start: 2024-09-24

## 2024-09-24 NOTE — PATIENT INSTRUCTIONS
Thank you for seeing me today Willy Reed, it was a pleasure to see you again!    Ambulate as tolerated and no heavy lifting.   Rx Prednisone taper   Take Tylenol 1300 mg every 8 hours for pain with food, NO ADVIL, IBUPROFEN, MOTRIN OR ALEVE   Flexeril every 8 hours as needed for muscle spasm- do not drive or make any important decisions while on this medication for it can make you drowsy.   Ice to low back x 20 min 3-4 times per day   Low back stretches     See ortho as scheduled     If you experience any worsening pain, swelling, numbness, and/or weakness, call the office or go to the emergency department    For assistance with scheduling referrals or consultations, please call [phone] or [phone].    For laboratory, radiology, and other tests, please call [phone] ([phone] for pediatrics).   If you do not get results within 7-10 days, or you have any questions or concerns, please send a message, call the office ([phone]), or return to the office for a follow-up appointment.     For acute/sick visits, if you are unable to get an office visit, you can do a  On Demand Virtual Visit that is accessible via your My Chart account.  For emergencies, call 9-1-1 or go to the nearest Emergency Department.     Please schedule additional appointment(s) to address concern(s) not addressed today.    Please review prescription inserts and published information for possible adverse effects of all medications.     In general, results are discussed over the phone or via  [employer].     You can see your health information, review clinical summaries from office visits & test results online when you follow your health with MY  Chart, a personal health record.   To sign up go to [email or web address].   If you need assistance with signing up or trouble getting into your account call [employer] Patient Line 24/7 at [phone]     RTC AS SCHEDULED IN JAN 2025    Glory Rasmussen             Ways to Help Prevent Falls at Home    Quick Tips   ? Ask for help if you need it. Most people want to help!   ? Get up slowly after sitting or laying down   ? Wear a medical alert device or keep cell phone in your pocket   ? Use night lights, especially areas near a bathroom   ? Keep the items you use often within reach on a small stool or end table   ? Use an assistive device such as walker or cane, as directed by provider/physical therapy   ? Use a non-slip mat and grab bars in your bathroom. Look for home health sections for best options     Other Areas to Focus On   ? Exercise and nutrition: Regular exercise or taking a falls prevention class are great ways improve strength and balance. Don’t forget to stay hydrated and bring a snack!   ? Medicine side effects: Some medicines can make you sleepy or dizzy, which could cause a fall. Ask your healthcare provider about the side effects your medicines could cause. Be sure to let them know if you take any vitamins or supplements as well.   ? Tripping hazards: Remove items you could trip on, such as loose mats, rugs, cords, and clutter. Wear closed toe shoes with rubber soles.   ? Health and wellness: Get regular checkups with your healthcare provider, plus routine vision and hearing screenings. Talk with your healthcare provider about:   o Your medicines and the possible side effects - bring them in a bag if that is easier!   o Problems with balance or feeling dizzy   o Ways to promote bone health, such as Vitamin D and calcium supplements   o Questions or concerns about falling     *Ask your healthcare team if you have questions     ©[facility], 2022

## 2024-09-26 ENCOUNTER — OFFICE VISIT (OUTPATIENT)
Dept: CARDIOLOGY | Facility: CLINIC | Age: 79
End: 2024-09-26
Payer: MEDICARE

## 2024-09-26 ENCOUNTER — HOSPITAL ENCOUNTER (OUTPATIENT)
Dept: CARDIOLOGY | Facility: CLINIC | Age: 79
Discharge: HOME | End: 2024-09-26
Payer: MEDICARE

## 2024-09-26 VITALS
RESPIRATION RATE: 16 BRPM | WEIGHT: 235.9 LBS | HEART RATE: 74 BPM | BODY MASS INDEX: 28.73 KG/M2 | HEIGHT: 76 IN | SYSTOLIC BLOOD PRESSURE: 124 MMHG | DIASTOLIC BLOOD PRESSURE: 70 MMHG | OXYGEN SATURATION: 96 %

## 2024-09-26 DIAGNOSIS — E78.5 HYPERLIPIDEMIA, UNSPECIFIED HYPERLIPIDEMIA TYPE: ICD-10-CM

## 2024-09-26 DIAGNOSIS — R94.31 ABNORMAL ELECTROCARDIOGRAM (ECG) (EKG): ICD-10-CM

## 2024-09-26 DIAGNOSIS — I10 PRIMARY HYPERTENSION: ICD-10-CM

## 2024-09-26 DIAGNOSIS — Q21.12 PATENT FORAMEN OVALE (HHS-HCC): ICD-10-CM

## 2024-09-26 PROCEDURE — 93010 ELECTROCARDIOGRAM REPORT: CPT | Performed by: INTERNAL MEDICINE

## 2024-09-26 PROCEDURE — 1159F MED LIST DOCD IN RCRD: CPT | Performed by: INTERNAL MEDICINE

## 2024-09-26 PROCEDURE — 93306 TTE W/DOPPLER COMPLETE: CPT | Performed by: INTERNAL MEDICINE

## 2024-09-26 PROCEDURE — 1160F RVW MEDS BY RX/DR IN RCRD: CPT | Performed by: INTERNAL MEDICINE

## 2024-09-26 PROCEDURE — 99214 OFFICE O/P EST MOD 30 MIN: CPT | Performed by: INTERNAL MEDICINE

## 2024-09-26 PROCEDURE — 93005 ELECTROCARDIOGRAM TRACING: CPT | Performed by: INTERNAL MEDICINE

## 2024-09-26 PROCEDURE — 1123F ACP DISCUSS/DSCN MKR DOCD: CPT | Performed by: INTERNAL MEDICINE

## 2024-09-26 PROCEDURE — 3078F DIAST BP <80 MM HG: CPT | Performed by: INTERNAL MEDICINE

## 2024-09-26 PROCEDURE — 3074F SYST BP LT 130 MM HG: CPT | Performed by: INTERNAL MEDICINE

## 2024-09-26 PROCEDURE — 93306 TTE W/DOPPLER COMPLETE: CPT

## 2024-09-26 PROCEDURE — 1036F TOBACCO NON-USER: CPT | Performed by: INTERNAL MEDICINE

## 2024-09-26 RX ORDER — DEXTROMETHORPHAN HYDROBROMIDE, GUAIFENESIN 5; 100 MG/5ML; MG/5ML
1300 LIQUID ORAL 3 TIMES DAILY PRN
COMMUNITY

## 2024-09-26 ASSESSMENT — PATIENT HEALTH QUESTIONNAIRE - PHQ9
1. LITTLE INTEREST OR PLEASURE IN DOING THINGS: NOT AT ALL
SUM OF ALL RESPONSES TO PHQ9 QUESTIONS 1 AND 2: 0
2. FEELING DOWN, DEPRESSED OR HOPELESS: NOT AT ALL

## 2024-09-29 LAB
AORTIC VALVE PEAK VELOCITY: 1.39 M/S
ATRIAL RATE: 70 BPM
AV PEAK GRADIENT: 7.7 MMHG
AVA (PEAK VEL): 4.05 CM2
EJECTION FRACTION APICAL 4 CHAMBER: 57.9
EJECTION FRACTION: 63 %
LEFT ATRIUM VOLUME AREA LENGTH INDEX BSA: 23.8 ML/M2
LEFT VENTRICLE INTERNAL DIMENSION DIASTOLE: 4.4 CM (ref 3.5–6)
LEFT VENTRICULAR OUTFLOW TRACT DIAMETER: 2.6 CM
MITRAL VALVE E/A RATIO: 1.08
MITRAL VALVE E/E' RATIO: 8.1
P AXIS: 65 DEGREES
P OFFSET: 152 MS
P ONSET: 98 MS
PR INTERVAL: 226 MS
Q ONSET: 211 MS
QRS COUNT: 12 BEATS
QRS DURATION: 110 MS
QT INTERVAL: 402 MS
QTC CALCULATION(BAZETT): 434 MS
QTC FREDERICIA: 423 MS
R AXIS: -76 DEGREES
RIGHT VENTRICLE FREE WALL PEAK S': 11.4 CM/S
RIGHT VENTRICLE PEAK SYSTOLIC PRESSURE: 35.3 MMHG
T AXIS: 52 DEGREES
T OFFSET: 412 MS
TRICUSPID ANNULAR PLANE SYSTOLIC EXCURSION: 2.1 CM
VENTRICULAR RATE: 70 BPM

## 2024-09-30 ENCOUNTER — OFFICE VISIT (OUTPATIENT)
Dept: ORTHOPEDIC SURGERY | Facility: CLINIC | Age: 79
End: 2024-09-30
Payer: MEDICARE

## 2024-09-30 VITALS — BODY MASS INDEX: 28.62 KG/M2 | WEIGHT: 235 LBS | HEIGHT: 76 IN

## 2024-09-30 DIAGNOSIS — M51.369 LUMBAR DEGENERATIVE DISC DISEASE: Primary | ICD-10-CM

## 2024-09-30 DIAGNOSIS — S32.010A CLOSED COMPRESSION FRACTURE OF BODY OF L1 VERTEBRA (MULTI): ICD-10-CM

## 2024-09-30 DIAGNOSIS — M54.16 LUMBAR RADICULOPATHY, ACUTE: ICD-10-CM

## 2024-09-30 DIAGNOSIS — M25.551 RIGHT HIP PAIN: ICD-10-CM

## 2024-09-30 PROCEDURE — 1123F ACP DISCUSS/DSCN MKR DOCD: CPT | Performed by: PHYSICIAN ASSISTANT

## 2024-09-30 PROCEDURE — 99203 OFFICE O/P NEW LOW 30 MIN: CPT | Performed by: PHYSICIAN ASSISTANT

## 2024-09-30 PROCEDURE — 99213 OFFICE O/P EST LOW 20 MIN: CPT | Performed by: PHYSICIAN ASSISTANT

## 2024-09-30 PROCEDURE — 1159F MED LIST DOCD IN RCRD: CPT | Performed by: PHYSICIAN ASSISTANT

## 2024-09-30 RX ORDER — GABAPENTIN 300 MG/1
300 CAPSULE ORAL 2 TIMES DAILY
Qty: 60 CAPSULE | Refills: 1 | Status: SHIPPED | OUTPATIENT
Start: 2024-09-30

## 2024-09-30 ASSESSMENT — PAIN - FUNCTIONAL ASSESSMENT: PAIN_FUNCTIONAL_ASSESSMENT: 0-10

## 2024-09-30 ASSESSMENT — PAIN SCALES - GENERAL: PAINLEVEL_OUTOF10: 5 - MODERATE PAIN

## 2024-09-30 ASSESSMENT — PAIN DESCRIPTION - DESCRIPTORS: DESCRIPTORS: ACHING

## 2024-10-01 NOTE — PROGRESS NOTES
HPI:  Willy Reed is a 79 y.o. male who presents to the spine clinic for approx 4 weeks of severe back and RLE pain.     Denies injury or inciting event. Reports right low back pain with radiation into the right anterior thigh and groin. Reports numbness in the right anteiror thigh and tingling sensations in the right foot.   Pain is affecting his sleep.     Initially seen at Urgent Care on 09/11/24 and subsequently at Urgent Care on 09/19/24. He then saw PCP on 09/24/24 and was referred here for Xray review and further management.     Has tried a combination of Tramadol, Tylenol, Flexeril, and Prednisone taper - none of which have provided much relief.     ROS:  Reviewed on EMR and patient intake sheet.    PMH/SH:  Reviewed on EMR and patient intake sheet.    Exam:  Physical Exam  Spine Musculoskeletal Exam    Well appearing, NAD  Pleasant & cooperative  BMI 28.61  normal ROM lumbar spine with rotation, flexion/extension  no paraspinal muscle spasms  R hip NTTP  Decreased sensation right anterior thigh: remaining lower extremity sensation intact  lower extremity motor 5/5 throughout  antalgic gait & station  Neg SLR   Neg Patricks maneuver    Radiology:     Xrays lumbar spine & right hip dated 09/19/24 personally reviewed along with the accompanying rad reports.   Moderate degenerative changes right hip without fracture or dislocation.   L1 compression fracture with approx 50% loss of body height; unknown chronicity but new compared to prior CT scan from 05/17/21. Multilevel advanced disc degeneration and osteophyte formation. No spondylolisthesis.     Assessment and Plan:  1. Right hip pain  - MR lumbar spine wo IV contrast; Future    2. Lumbar radiculopathy, acute  - MR lumbar spine wo IV contrast; Future  - gabapentin (Neurontin) 300 mg capsule; Take 1 capsule (300 mg) by mouth 2 times a day.  Dispense: 60 capsule; Refill: 1    3. Lumbar degenerative disc disease    4. Closed compression fracture of body of  L1 vertebra (Multi)    I reviewed the xray images in detail with the patient today. Patient with severe back and RLE pain that is refractory to medication so far.     If symptoms poorly explained by Lumbar MRI recommend evaluation by one or hip specialists.     Will try Gabapentin 300mg BID for pain at this time. I would also like to obtain an MRI of the lumbar spine to further evaluate. He may benefit from an SOLOMON, however given the multilevel degenerative changes on the Xray and MRI will help better localize.     Patient in agreement to plan of care. Of course Willy Reed is welcome to call at anytime with questions or concerns.     Liza Salinas PA-C  Department of Orthopaedic Surgery    [address] [place] [address]    Voicemail: [phone]   Appts: [phone]  Fax: [phone]

## 2024-10-03 ENCOUNTER — HOSPITAL ENCOUNTER (OUTPATIENT)
Dept: RADIOLOGY | Facility: HOSPITAL | Age: 79
Discharge: HOME | End: 2024-10-03
Payer: MEDICARE

## 2024-10-03 DIAGNOSIS — M54.16 LUMBAR RADICULOPATHY, ACUTE: ICD-10-CM

## 2024-10-03 DIAGNOSIS — M25.551 RIGHT HIP PAIN: ICD-10-CM

## 2024-10-03 PROCEDURE — 72148 MRI LUMBAR SPINE W/O DYE: CPT

## 2024-10-09 ENCOUNTER — TELEPHONE (OUTPATIENT)
Dept: ORTHOPEDIC SURGERY | Facility: CLINIC | Age: 79
End: 2024-10-09
Payer: MEDICARE

## 2024-10-09 DIAGNOSIS — M84.48XA PATHOLOGIC FRACTURE OF LUMBAR VERTEBRA, INITIAL ENCOUNTER: Primary | ICD-10-CM

## 2024-10-09 DIAGNOSIS — D49.2 NEOPLASM OF UNSPECIFIED BEHAVIOR OF BONE, SOFT TISSUE, AND SKIN: ICD-10-CM

## 2024-10-09 NOTE — TELEPHONE ENCOUNTER
Called patient to review results of MRI lumbar spine. Patient with fracture at L1 concerning for pathologic fracture/metastatic tumor.     No known history of Cancer.   Recommend CT guided biopsy at L1 for pathology diagnosis and Nuclear Medicine Bone scan to better evaluate.    In regard to the back pain and radicular right leg pain due to nerve compression we discussed surgical intervention would be a minimally invasive decompression with percutaneous pedicle screw and suzanne placement to stabilize the fracture. He would not be a good candidate for kyphoplasty.     Patient would like to proceed with biopsy and bone scan and hold off on any additional surgery pending results.     All questions answered to the best of my ability.     Discussed with Dr. Baca.

## 2024-10-16 ENCOUNTER — HOSPITAL ENCOUNTER (OUTPATIENT)
Dept: RADIOLOGY | Facility: HOSPITAL | Age: 79
Discharge: HOME | End: 2024-10-16
Payer: MEDICARE

## 2024-10-16 DIAGNOSIS — D49.2 NEOPLASM OF UNSPECIFIED BEHAVIOR OF BONE, SOFT TISSUE, AND SKIN: ICD-10-CM

## 2024-10-16 DIAGNOSIS — M84.48XA PATHOLOGIC FRACTURE OF LUMBAR VERTEBRA, INITIAL ENCOUNTER: ICD-10-CM

## 2024-10-16 PROCEDURE — A9503 TC99M MEDRONATE: HCPCS | Performed by: PHYSICIAN ASSISTANT

## 2024-10-16 PROCEDURE — 78306 BONE IMAGING WHOLE BODY: CPT | Performed by: RADIOLOGY

## 2024-10-16 PROCEDURE — 78306 BONE IMAGING WHOLE BODY: CPT

## 2024-10-16 PROCEDURE — 3430000001 HC RX 343 DIAGNOSTIC RADIOPHARMACEUTICALS: Performed by: PHYSICIAN ASSISTANT

## 2024-10-23 DIAGNOSIS — Z79.01 ANTICOAGULATED: Primary | ICD-10-CM

## 2024-10-25 DIAGNOSIS — M84.48XA PATHOLOGIC FRACTURE OF LUMBAR VERTEBRA, INITIAL ENCOUNTER: Primary | ICD-10-CM

## 2024-10-25 RX ORDER — GABAPENTIN 600 MG/1
600 TABLET ORAL 3 TIMES DAILY
Qty: 90 TABLET | Refills: 1 | Status: SHIPPED | OUTPATIENT
Start: 2024-10-25 | End: 2024-12-24

## 2024-10-25 RX ORDER — TRAMADOL HYDROCHLORIDE 50 MG/1
50 TABLET ORAL EVERY 8 HOURS PRN
Qty: 21 TABLET | Refills: 0 | Status: SHIPPED | OUTPATIENT
Start: 2024-10-25 | End: 2024-11-01

## 2024-10-28 ENCOUNTER — LAB (OUTPATIENT)
Dept: LAB | Facility: LAB | Age: 79
End: 2024-10-28
Payer: MEDICARE

## 2024-10-28 DIAGNOSIS — Z79.01 ANTICOAGULATED: ICD-10-CM

## 2024-10-28 LAB
ERYTHROCYTE [DISTWIDTH] IN BLOOD BY AUTOMATED COUNT: 14 % (ref 11.5–14.5)
HCT VFR BLD AUTO: 46.2 % (ref 41–52)
HGB BLD-MCNC: 15.2 G/DL (ref 13.5–17.5)
INR PPP: 1.3 (ref 0.9–1.1)
MCH RBC QN AUTO: 30.2 PG (ref 26–34)
MCHC RBC AUTO-ENTMCNC: 32.9 G/DL (ref 32–36)
MCV RBC AUTO: 92 FL (ref 80–100)
NRBC BLD-RTO: 0 /100 WBCS (ref 0–0)
PLATELET # BLD AUTO: 175 X10*3/UL (ref 150–450)
PROTHROMBIN TIME: 15.1 SECONDS (ref 9.8–12.8)
RBC # BLD AUTO: 5.04 X10*6/UL (ref 4.5–5.9)
WBC # BLD AUTO: 10.2 X10*3/UL (ref 4.4–11.3)

## 2024-10-28 PROCEDURE — 85027 COMPLETE CBC AUTOMATED: CPT

## 2024-10-28 PROCEDURE — 36415 COLL VENOUS BLD VENIPUNCTURE: CPT

## 2024-10-28 PROCEDURE — 85610 PROTHROMBIN TIME: CPT

## 2024-10-31 ENCOUNTER — HOSPITAL ENCOUNTER (OUTPATIENT)
Dept: RADIOLOGY | Facility: HOSPITAL | Age: 79
Discharge: HOME | End: 2024-10-31
Payer: MEDICARE

## 2024-10-31 VITALS
DIASTOLIC BLOOD PRESSURE: 88 MMHG | TEMPERATURE: 97.9 F | HEART RATE: 68 BPM | OXYGEN SATURATION: 97 % | SYSTOLIC BLOOD PRESSURE: 155 MMHG | RESPIRATION RATE: 16 BRPM

## 2024-10-31 DIAGNOSIS — D49.2 NEOPLASM OF UNSPECIFIED BEHAVIOR OF BONE, SOFT TISSUE, AND SKIN: ICD-10-CM

## 2024-10-31 DIAGNOSIS — M84.48XA PATHOLOGIC FRACTURE OF LUMBAR VERTEBRA, INITIAL ENCOUNTER: ICD-10-CM

## 2024-10-31 PROCEDURE — 7100000009 HC PHASE TWO TIME - INITIAL BASE CHARGE

## 2024-10-31 PROCEDURE — 2500000004 HC RX 250 GENERAL PHARMACY W/ HCPCS (ALT 636 FOR OP/ED): Performed by: STUDENT IN AN ORGANIZED HEALTH CARE EDUCATION/TRAINING PROGRAM

## 2024-10-31 PROCEDURE — 7100000010 HC PHASE TWO TIME - EACH INCREMENTAL 1 MINUTE

## 2024-10-31 PROCEDURE — 99152 MOD SED SAME PHYS/QHP 5/>YRS: CPT

## 2024-10-31 PROCEDURE — 99152 MOD SED SAME PHYS/QHP 5/>YRS: CPT | Performed by: RADIOLOGY

## 2024-10-31 PROCEDURE — 2720000007 HC OR 272 NO HCPCS

## 2024-10-31 PROCEDURE — 2500000004 HC RX 250 GENERAL PHARMACY W/ HCPCS (ALT 636 FOR OP/ED): Performed by: RADIOLOGY

## 2024-10-31 PROCEDURE — 77012 CT SCAN FOR NEEDLE BIOPSY: CPT

## 2024-10-31 RX ORDER — FENTANYL CITRATE 50 UG/ML
INJECTION, SOLUTION INTRAMUSCULAR; INTRAVENOUS
Status: COMPLETED | OUTPATIENT
Start: 2024-10-31 | End: 2024-10-31

## 2024-10-31 RX ORDER — MIDAZOLAM HYDROCHLORIDE 1 MG/ML
INJECTION INTRAMUSCULAR; INTRAVENOUS
Status: COMPLETED | OUTPATIENT
Start: 2024-10-31 | End: 2024-10-31

## 2024-10-31 RX ORDER — ONDANSETRON HYDROCHLORIDE 2 MG/ML
4 INJECTION, SOLUTION INTRAVENOUS ONCE
Status: COMPLETED | OUTPATIENT
Start: 2024-10-31 | End: 2024-10-31

## 2024-10-31 RX ORDER — HYDROMORPHONE HYDROCHLORIDE 1 MG/ML
0.4 INJECTION, SOLUTION INTRAMUSCULAR; INTRAVENOUS; SUBCUTANEOUS ONCE
Status: COMPLETED | OUTPATIENT
Start: 2024-10-31 | End: 2024-10-31

## 2024-10-31 ASSESSMENT — PAIN - FUNCTIONAL ASSESSMENT
PAIN_FUNCTIONAL_ASSESSMENT: 0-10

## 2024-10-31 ASSESSMENT — PAIN SCALES - GENERAL
PAINLEVEL_OUTOF10: 0 - NO PAIN
PAINLEVEL_OUTOF10: 0 - NO PAIN
PAINLEVEL_OUTOF10: 7
PAINLEVEL_OUTOF10: 5 - MODERATE PAIN
PAINLEVEL_OUTOF10: 0 - NO PAIN
PAINLEVEL_OUTOF10: 0 - NO PAIN
PAINLEVEL_OUTOF10: 5 - MODERATE PAIN

## 2024-11-04 ENCOUNTER — HOSPITAL ENCOUNTER (EMERGENCY)
Facility: HOSPITAL | Age: 79
Discharge: HOME | End: 2024-11-04
Attending: STUDENT IN AN ORGANIZED HEALTH CARE EDUCATION/TRAINING PROGRAM
Payer: MEDICARE

## 2024-11-04 ENCOUNTER — APPOINTMENT (OUTPATIENT)
Dept: RADIOLOGY | Facility: HOSPITAL | Age: 79
End: 2024-11-04
Payer: MEDICARE

## 2024-11-04 VITALS
HEART RATE: 108 BPM | SYSTOLIC BLOOD PRESSURE: 179 MMHG | BODY MASS INDEX: 28.46 KG/M2 | HEIGHT: 76 IN | RESPIRATION RATE: 18 BRPM | OXYGEN SATURATION: 96 % | WEIGHT: 233.69 LBS | TEMPERATURE: 98.1 F | DIASTOLIC BLOOD PRESSURE: 96 MMHG

## 2024-11-04 DIAGNOSIS — M79.604 RIGHT LEG PAIN: Primary | ICD-10-CM

## 2024-11-04 DIAGNOSIS — M89.8X8 MASS OF SPINE: ICD-10-CM

## 2024-11-04 DIAGNOSIS — K59.00 CONSTIPATION, UNSPECIFIED CONSTIPATION TYPE: ICD-10-CM

## 2024-11-04 DIAGNOSIS — M79.89 RIGHT LEG SWELLING: ICD-10-CM

## 2024-11-04 PROCEDURE — 2500000001 HC RX 250 WO HCPCS SELF ADMINISTERED DRUGS (ALT 637 FOR MEDICARE OP): Performed by: STUDENT IN AN ORGANIZED HEALTH CARE EDUCATION/TRAINING PROGRAM

## 2024-11-04 PROCEDURE — 99283 EMERGENCY DEPT VISIT LOW MDM: CPT | Mod: 25

## 2024-11-04 PROCEDURE — 93971 EXTREMITY STUDY: CPT | Performed by: SURGERY

## 2024-11-04 PROCEDURE — 93971 EXTREMITY STUDY: CPT

## 2024-11-04 RX ORDER — OXYCODONE HYDROCHLORIDE 5 MG/1
5 TABLET ORAL EVERY 8 HOURS PRN
Qty: 10 TABLET | Refills: 0 | Status: SHIPPED | OUTPATIENT
Start: 2024-11-04 | End: 2024-11-07 | Stop reason: SDUPTHER

## 2024-11-04 RX ORDER — OXYCODONE AND ACETAMINOPHEN 5; 325 MG/1; MG/1
1 TABLET ORAL ONCE
Status: COMPLETED | OUTPATIENT
Start: 2024-11-04 | End: 2024-11-04

## 2024-11-04 RX ORDER — SYRING-NEEDL,DISP,INSUL,0.3 ML 29 G X1/2"
296 SYRINGE, EMPTY DISPOSABLE MISCELLANEOUS ONCE
Status: COMPLETED | OUTPATIENT
Start: 2024-11-04 | End: 2024-11-04

## 2024-11-04 ASSESSMENT — PAIN SCALES - GENERAL: PAINLEVEL_OUTOF10: 9

## 2024-11-05 NOTE — DISCHARGE INSTRUCTIONS
You should not drive a vehicle or operate machinery if using oxycodone.  You should increase your intake of fiber, fruits, and vegetables, and try to increase your activity level.  You should increase your MiraLAX dosage until you are able to achieve 1 soft bowel movement daily.  Follow-up with your primary care physician.  Return to the emergency department with any worsening symptoms.    It is important to remember that your care does not end here and you must continue to monitor your condition closely. Please return to the emergency department for any worsening or concerning signs or symptoms as directed by our conversations and the discharge instructions. If you do not have a doctor please contact the referral number on your discharge instructions. Please contact any physician specialists provided in your discharge notes as it is very important to follow up with them regarding your condition. If you are unable to reach the physicians provided, please come back to the Emergency Department at any time.    Return to emergency room without delay for ANY new or worsening pains or for any other symptoms or concerns.  Return with worsening pains, nausea, vomiting, trouble breathing, palpitations, shortness of breath, inability to pass stool or urine, loss of control of stool or urine, any numbness or tingling (that is not normal for you), uncontrolled fevers, the passing of blood or other material in stool or urine, rashes, pains or for any other symptoms or concerns you may have.  You are always welcome to return to the ER at any time for any reason or for any other concerns you may have.

## 2024-11-05 NOTE — ED PROVIDER NOTES
HPI   Chief Complaint   Patient presents with    Leg Pain     Pt states that his right leg has been hurting from the top of his right buttock down to his groin, and down the front of his right leg.  Pt states that it has been bothering him for 4 weeks.  Pt states that he had a spinal fracture a few years ago that affected some nerves.  Pt states he also has a lesion on his back that he has had scans and tests done on and is waiting for biopsy results.       Patient presents with pain in his right buttock and right leg.  He reports that for the last approximately 6 weeks, he has been having pain in this area.  He has been using Tylenol as well as tramadol but he reports he has not slept very much.  He reports that he is also constipated.  His right leg is very swollen.  He did have a biopsy of an abnormal lesion in L1 several days ago.  He reports that the pain in his leg has been gradually progressive, it is not suddenly worsened.              Patient History   Past Medical History:   Diagnosis Date    Cerebral infarction, unspecified 01/25/2022    CVA (cerebral vascular accident)    Essential (primary) hypertension 09/22/2022    HTN (hypertension)    Personal history of other diseases of the circulatory system 12/17/2019    History of atrial septal defect    Personal history of other diseases of the nervous system and sense organs     History of sleep apnea    Sleep apnea      Past Surgical History:   Procedure Laterality Date    CORONARY ARTERY BYPASS GRAFT  09/01/2016    CABG    MR HEAD ANGIO WO IV CONTRAST  6/28/2016    MR HEAD ANGIO WO IV CONTRAST 6/28/2016 [facility]    MR HEAD ANGIO WO IV CONTRAST  1/19/2021    MR HEAD ANGIO WO IV CONTRAST [facility]    MR NECK ANGIO WO IV CONTRAST  6/28/2016    MR NECK ANGIO WO IV CONTRAST 6/28/2016 [facility]    MR NECK ANGIO WO IV CONTRAST  1/20/2021    MR NECK ANGIO WO IV CONTRAST [facility]    OTHER SURGICAL HISTORY  06/25/2013    Injection Of  Neurolytic Substance Epidural Thoracic    OTHER SURGICAL HISTORY  07/25/2022    Heart surgery    OTHER SURGICAL HISTORY  07/25/2022    Knee surgery    OTHER SURGICAL HISTORY  09/01/2016    Shoulder Surgery Left    SHOULDER SURGERY  09/01/2016    Shoulder Surgery Right     No family history on file.  Social History     Tobacco Use    Smoking status: Never     Passive exposure: Never    Smokeless tobacco: Never   Vaping Use    Vaping status: Never Used   Substance Use Topics    Alcohol use: Not Currently     Comment: rare    Drug use: Never       Physical Exam   ED Triage Vitals [11/04/24 2122]   Temperature Heart Rate Respirations BP   36.7 °C (98.1 °F) (!) 108 18 (!) 179/96      Pulse Ox Temp Source Heart Rate Source Patient Position   96 % Oral Monitor Sitting      BP Location FiO2 (%)     Right arm --       Physical Exam  HENT:      Head: Normocephalic.   Eyes:      General: No scleral icterus.  Neurological:      Mental Status: He is alert.      Comments: Strength of hip extension, flexion, abduction, adduction intact.  Strength of knee flexion and extension intact.  Strength of ankle flexion and extension intact.  Strength of toe plantar flexion and dorsiflexion intact.  Sensation intact in bilateral lower extremities including inner thigh.            ED Course & MDM   Diagnoses as of 11/04/24 2233   Right leg pain   Right leg swelling   Mass of spine   Constipation, unspecified constipation type                 No data recorded                                 Medical Decision Making  Pain in patient's back has been gradually progressive, it is not suddenly worsen.  His neurological examination is normal.  My suspicion for cauda equina syndrome, epidural abscess, epidural hematoma, spinal cord compression is very low.  Patient treated symptomatically with oxycodone given his known L1 lesion, suspicious for neoplasm.  DVT study obtained in setting of swelling of right lower extremity.  Patient given prescription  for oxycodone given his known L1 lesion.  DVT study does not reveal DVT but does reveal SVT.  Patient is already on Eliquis.  Patient advised to follow-up with primary care physician.  Return precautions given for any worsening symptoms.  Parts of this chart were completed with dictation software, please excuse any errors in transcription.        Procedure  Procedures     Joseph Shaffer MD  11/04/24 4443

## 2024-11-07 ENCOUNTER — TELEPHONE (OUTPATIENT)
Dept: HEMATOLOGY/ONCOLOGY | Facility: HOSPITAL | Age: 79
End: 2024-11-07
Payer: MEDICARE

## 2024-11-07 ENCOUNTER — TELEMEDICINE (OUTPATIENT)
Dept: HEMATOLOGY/ONCOLOGY | Facility: HOSPITAL | Age: 79
End: 2024-11-07
Payer: MEDICARE

## 2024-11-07 DIAGNOSIS — C80.1 METASTATIC CARCINOMA INVOLVING BONE WITH UNKNOWN PRIMARY SITE (MULTI): ICD-10-CM

## 2024-11-07 DIAGNOSIS — M84.48XA PATHOLOGIC FRACTURE OF LUMBAR VERTEBRA, INITIAL ENCOUNTER: Primary | ICD-10-CM

## 2024-11-07 DIAGNOSIS — R91.8 LUNG MASS: ICD-10-CM

## 2024-11-07 DIAGNOSIS — C79.51 METASTATIC CARCINOMA INVOLVING BONE WITH UNKNOWN PRIMARY SITE (MULTI): ICD-10-CM

## 2024-11-07 DIAGNOSIS — C79.51 METASTATIC CARCINOMA TO BONE (MULTI): Primary | ICD-10-CM

## 2024-11-07 DIAGNOSIS — S32.010A CLOSED COMPRESSION FRACTURE OF BODY OF L1 VERTEBRA (MULTI): ICD-10-CM

## 2024-11-07 PROCEDURE — 1159F MED LIST DOCD IN RCRD: CPT | Performed by: NURSE PRACTITIONER

## 2024-11-07 PROCEDURE — 1036F TOBACCO NON-USER: CPT | Performed by: NURSE PRACTITIONER

## 2024-11-07 PROCEDURE — 99205 OFFICE O/P NEW HI 60 MIN: CPT | Performed by: NURSE PRACTITIONER

## 2024-11-07 PROCEDURE — 1123F ACP DISCUSS/DSCN MKR DOCD: CPT | Performed by: NURSE PRACTITIONER

## 2024-11-07 PROCEDURE — 1160F RVW MEDS BY RX/DR IN RCRD: CPT | Performed by: NURSE PRACTITIONER

## 2024-11-07 RX ORDER — OXYCODONE HYDROCHLORIDE 5 MG/1
5 TABLET ORAL EVERY 8 HOURS PRN
Qty: 21 TABLET | Refills: 0 | Status: SHIPPED | OUTPATIENT
Start: 2024-11-07 | End: 2024-11-14

## 2024-11-07 NOTE — TELEPHONE ENCOUNTER
11/7/2024 @ 1305:  Called and spoke with patient, per Nichelle Garner's request.  Introduced myself and explained my role within the [facility].  Reviewed the following upcoming Radiology appointments, including test information, location information and prep required for each test:    Fri 11/8/2024 @ 1:30 PM - CT Chest ([facility])  Mon. 11/11/2024 @ 9 AM - PET/CT ([address] [place] [address])  Tues. 11/12/2024 @ 5:15 PM - MRI Brain ([facility])    Patient requests an earlier appt time for his MRI Brain, if possible, due to time of day and driving distance.  Reviewed patient's location preferences.  Advised patient that I will request other appointment options and will contact him with an update.    Patient states he uses [facility] to review his appointment information.  Advised patient that appointment location and prep information is included in each appointment within [employer], as well, if needed.  Reviewed importance to not take any medication, that must be taken with food, prior to his PET/CT.  Advised patient to bring these medications with him to his PET/CT, so that he can take them following his test, with a snack or breakfast.   Advised patient that he may bring his own snack/beverage with him, if preferred.    Patient and his wife stated that all questions have been answered.  Advised that I will contact them as quickly as possible with an update on his MRI Brain appointment.  Instructed to contact us with further questions or concerns; our contact information was provided.    Update sent to CHING Andrews-CNP.    Laura Betancourt RN  [facility] - RN Care Coordinator  Office:  [phone]

## 2024-11-07 NOTE — TELEPHONE ENCOUNTER
11/7/2024 @ 1340:  Spoke with patient and provided updated MRI Brain appointment information as follows:         Sat. 11/9/2024 @ 9:45a (9:30a arrival) - MRI Brain -  [facility]    Reviewed location and prep information.  Patient and his wife voiced appreciation and agreement with this plan and they stated awareness to contact us with any questions or concerns.    Update sent to CHING Andrews-CNP.    Laura Betancourt RN  [facility] - RN Care Coordinator  Office:  [phone]

## 2024-11-07 NOTE — PROGRESS NOTES
"Patient called today to review CT biopsy results of L1 vertebral body.     Per Report:  L1 vertebral body, biopsy:  -- Metastatic carcinoma involving bone; see note.  -- Immunohistochemical stains performed on formalin-fixed, paraffin embedded sections demonstrate neoplastic cells to be positive for AE1/AE3, EMA and TTF1 (8G7G3 clone), and negative for GFAP, CDX2, PAX8 and NKX3.1.     Note: The morphologic and immunophenotypic features are most consistent with spread from a pulmonary primary; however, other primary sites cannot be entirely ruled out. Correlation with clinical and radiologic features is needed for further evaluation. Additional immunostain for PD-L1 will be performed and reported in an addendum. Next generation sequencing (NGS) will be performed and reported separately.     Again reviewed prior imaging with patient including MRI lumbar spine 10/03/24 and NM bone scan 10/16/24.   Of note, there is a prior CT Chest dated 03/06/24 which notes \"Right lung apex anterior indeterminate irregular somewhat spiculated mass measuring up to 4 cm in craniocaudal dimension not significantly changed in size or configuration from prior\".     Discussed diagnosis of metastatic tumor to L1 vertebral body with likely primary lung cancer. An urgent referral to Oncology [facility] placed today and patient was provided phone number for central scheduling.     His main complaint continues to be severe right leg pain. We broadly discussed surgical intervention being a decompression/debulking of tumor followed by fusion to stabilize the area. Given his severe pain he is interested in moving forward with surgery.   I discussed the case with Dr. Baca today and an appointment was made for tomorrow 11/08/24 in our [place] office to further discuss details of surgical intervention including the ultimate risks/benefits/alternatives.     In the meantime a prescription for Oxycodone was provided to the patient today. He " continues with Gabapentin 600mg TID.     I have personally reviewed the OARRS report for the patient, no issues identified. This report is scanned into the electronic medical record. I have considered the risks of abuse, dependence, addiction and diversion. The 7 day Rx sent to pharmacy on file.     Patient in agreement to plan of care.

## 2024-11-08 ENCOUNTER — HOSPITAL ENCOUNTER (OUTPATIENT)
Dept: RADIOLOGY | Facility: HOSPITAL | Age: 79
Discharge: HOME | End: 2024-11-08
Payer: MEDICARE

## 2024-11-08 ENCOUNTER — OFFICE VISIT (OUTPATIENT)
Dept: ORTHOPEDIC SURGERY | Facility: CLINIC | Age: 79
End: 2024-11-08
Payer: MEDICARE

## 2024-11-08 VITALS — BODY MASS INDEX: 28.37 KG/M2 | HEIGHT: 76 IN | WEIGHT: 233 LBS

## 2024-11-08 DIAGNOSIS — C79.51 METASTATIC CARCINOMA TO BONE (MULTI): ICD-10-CM

## 2024-11-08 DIAGNOSIS — M84.48XA PATHOLOGIC FRACTURE OF LUMBAR VERTEBRA, INITIAL ENCOUNTER: Primary | ICD-10-CM

## 2024-11-08 DIAGNOSIS — D49.2 NEOPLASM OF UNSPECIFIED BEHAVIOR OF BONE, SOFT TISSUE, AND SKIN: ICD-10-CM

## 2024-11-08 DIAGNOSIS — R91.8 LUNG MASS: ICD-10-CM

## 2024-11-08 LAB
LAB AP ASR DISCLAIMER: NORMAL
LABORATORY COMMENT REPORT: NORMAL
PATH REPORT.ADDENDUM SPEC: NORMAL
PATH REPORT.FINAL DX SPEC: NORMAL
PATH REPORT.GROSS SPEC: NORMAL
PATH REPORT.RELEVANT HX SPEC: NORMAL
PATH REPORT.TOTAL CANCER: NORMAL

## 2024-11-08 PROCEDURE — 1159F MED LIST DOCD IN RCRD: CPT | Performed by: ORTHOPAEDIC SURGERY

## 2024-11-08 PROCEDURE — 99214 OFFICE O/P EST MOD 30 MIN: CPT | Performed by: ORTHOPAEDIC SURGERY

## 2024-11-08 PROCEDURE — 1123F ACP DISCUSS/DSCN MKR DOCD: CPT | Performed by: ORTHOPAEDIC SURGERY

## 2024-11-08 PROCEDURE — 71250 CT THORAX DX C-: CPT

## 2024-11-08 ASSESSMENT — ENCOUNTER SYMPTOMS
CONSTIPATION: 0
SLEEP DISTURBANCE: 1
EXTREMITY WEAKNESS: 1
APPETITE CHANGE: 0
BACK PAIN: 1
COUGH: 0
SHORTNESS OF BREATH: 0
NAUSEA: 0
ENDOCRINE COMMENTS: NO NIGHT SWEATS
VOMITING: 0
UNEXPECTED WEIGHT CHANGE: 1
ABDOMINAL PAIN: 0
CHILLS: 0
LEG SWELLING: 1
TROUBLE SWALLOWING: 0
FATIGUE: 1
FEVER: 0

## 2024-11-08 ASSESSMENT — PAIN - FUNCTIONAL ASSESSMENT: PAIN_FUNCTIONAL_ASSESSMENT: 0-10

## 2024-11-08 NOTE — LETTER
November 11, 2024     CHING Longoria-CNP  [address]  [facility]  [facility] [id number]    Patient: Willy Reed   YOB: 1945   Date of Visit: 11/8/2024       Dear CHING Mancilla-CNP:    Thank you for referring Willy Reed to me for evaluation. Below are my notes for this consultation.  If you have questions, please do not hesitate to call me. I look forward to following your patient along with you.       Sincerely,     Wilbur Baca MD      CC: No Recipients  ______________________________________________________________________________________    HPI:Willy Reed is an 79-year-old man, who comes in with complaints of right sided low back pain with radiation to the right thigh.  It is rather constant.  He recently had an MRI done which she comes to review today.  He is actually scheduled to have a visit with oncology on the 13th of this month after recent CT-guided biopsy of his L1 vertebrae demonstrated metastatic carcinoma.      ROS:  Reviewed on EMR and patient intake sheet.    PMH/SH:  Reviewed on EMR and patient intake sheet.    Exam:  Physical Exam    Constitutional: Well appearing; no acute distress  Eyes: pupils are equal and round  Psych: normal affect  Respiratory: non-labored breathing  Cardiovascular: regular rate and rhythm  GI: non-distended abdomen  Musculoskeletal: no pain with range of motion of the hips bilaterally  Neurologic: [4+]/5 strength in the lower extremities bilaterally]; [negative] straight leg raise    Radiology:     MRI demonstrates an expansile mass in the L1 vertebral body with extension into the spinal canal, right greater than left.  This produces severe lateral recess stenosis.    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  Patient is scheduled to see  oncology on the 13th.  We briefly talked about what surgical invention would require today.  Given the patient's age, stage of life, and now his metastatic cancer which is likely from a lung primary, surgical intervention would be a large undertaking.  I have recommended discussion with the oncologist first to get a better understanding of his big picture in regards to his primary cancer and other possible treatments.  If the patient's prognosis is favorable from his primary cancer, and his symptoms remain intolerable, then surgical intervention would certainly be reasonable at that point.  Patient will follow-up with me if he would like to pursue surgery in the future.    The patient was in agreement with the plan. At the end of the visit today, the patient felt that all questions had been answered satisfactorily.  The patient was pleased with the visit and very appreciative for the care rendered.     Thank you very much for the kind referral.  It is a privilege, and a pleasure, to partner with you in the care of your patients.  I would be delighted to assist you with any further consultations as needed.          Wilbur Baca MD    Chief of Spine Surgery, [facility]  Director of Spine Service, [facility]  , Department of Orthopaedics  [facility] [employer]  [address] [place] [address]  P: [phone]  [facility]    This note was dictated with voice recognition software.  It has not been proofread for grammatical errors, typographical mistakes or other semantic inconsistencies.

## 2024-11-08 NOTE — PROGRESS NOTES
[facility]  Diagnostic Clinic  New Patient Virtual Visit    Date of Service: 24      Patient Name: Willy Reed   : 1945  MRN: [id number]   PCP: CHING Longoria-LEO   Referred by: ELMIRA Elizondo    Problem: osseous metastatic lesion    HPI: Willy Reed is a 79 y.o. 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     Mr. Reed initially presented for office visit w/ orthopedic surgery on 24 for ~4 wk course of severe LBP and RLE pain. He was previously seen at Urgent Care on 24 and again at Urgent Care on 24. He then saw PCP on 24 and was referred to ortho for Xray review and further mgmt. He underwent MRI lumbar spine on 10/3/24, which revealed expansile lesion w/in L1 vertebral body, concerning for neoplastic involvement, in addition to L1 compression deformity & cauda equina nerve root compression, severe right L1-2 neural foraminal stenosis. Ortho ordered PET/CT bone whole body to further evaluate, completed 10/16/24, showed increased uptake in L1 vertebral body & subsequently underwent CT-g biopsy of the L1 vertebral body lesion on 10/31/24. Pathology reveals metastatic carcinoma involving bone, findings most c/w spread from pulmonary primary, w/ clinical & radiologic correlation recommended. He was referred to the diagnostic clinic for additional work-up & assistance arranging oncology care.    He presents virtually via telephone to clinic today, w/ his wife Judith. He describes ~2 month course of worsening LBP, radiating to right buttock, groin, and RLE.  He notes persistent numbness of his right foot w/ burning sensation. Also notes increasing swelling of his RLE. He initially used Tylenol PRN but pain worsening, now better controlled  w/ oxycodone. He was evaluated in [facility] ED on 11/4/24 for RLE pain w/ swelling. Duplex neg for DVT. Given Rx for oxy for pain.     Upon chart review, Mr. Reed has been following w/ Pulmonology for right apical lung mass (last measuring 3.2 x 1.9 x 4 cm on 3/6/24 CT chest). CT chest from 5/17/21 initially showed spiculated right paratracheal pulmonary nodule, measuring 2.1 x 2.6 cm, concerning for primary lung malignancy. He underwent PET/CT lung cancer staging 6/7/21 which showed nonspecific mildly increased uptake w/in RUL spiculated nodule, radiologist note states suggestive of benign etiology. Per Mr Derek, he was undergoing surveillance imaging to determine if mass increasing in size; states he has not undergone tissue sampling of mass. He was reportedly told after his last CT chest in 3/24 that no additional surveillance imaging was needed, w/ stable size.     History of tobacco use:   Former smoker, cigarettes, 9-10 yr history of smoking, quit 1971, 3/4 ppd.  Denies ETOH or illicit drug use.    Personal history of malignancy:   None.    Family history of malignancy:   Mother - lymphoma  Brother - metastatic thyroid cancer    Health Maintenance:  Lab Results   Component Value Date    PSA 0.81 01/25/2022   No prior colonoscopy or Cullen-guard results available.      PATHOLOGY:  10/31/24:  FINAL DIAGNOSIS      L1 vertebral body, biopsy:  -- Metastatic carcinoma involving bone; see note.  -- Immunohistochemical stains performed on formalin-fixed, paraffin embedded sections demonstrate neoplastic cells to be positive for AE1/AE3, EMA and TTF1 (8G7G3 clone), and negative for GFAP, CDX2, PAX8 and NKX3.1.     Note: The morphologic and immunophenotypic features are most consistent with spread from a pulmonary primary; however, other primary sites cannot be entirely ruled out. Correlation with clinical and radiologic features is needed for further evaluation. Additional immunostain for PD-L1 will be performed  and reported in an addendum. Next generation sequencing (NGS) will be performed and reported separately.       Electronically signed by Dipti Herring MD on 11/5/2024 at 1411        IMAGING:    === 10/16/24 ===    PET/CT BONE WHOLE BODY    IMPRESSION:  1. Increased radiotracer uptake is seen in the L1 vertebral body,  corresponding to the compression deformity seen on prior MR.  2. Focal activity in the left knee is likely degenerative, although  confirmation with anatomic imaging is recommended.          I personally reviewed the images/study and I agree with the findings  as stated by Daisha Jean-Baptiste MD. This study was interpreted at  [place], [place].      MACRO:  None      Signed by: Adin Davila 10/16/2024 2:40 PM  Dictation workstation:   [id number]    === 10/03/24 ===    MR LUMBAR SPINE WO CONTRAST    - Impression -  1. L1 compression deformity with retropulsion into the spinal canal  and associated conus medullaris and cauda equina nerve root  compression at L1 level as well as severe right L1-2 neural foraminal  stenosis. Similar degree of approximately 50% vertebral body height  loss.  2. Associated expansile lesion within the L1 vertebral body extending  into the right pedicle with intralesional vascular structures and  adjacent right lamina and superior facet marrow edema. This may  represent an intraosseous chronic hematoma or a posttraumatic  vascular malformation with neoplastic involvement not excluded.  3. Additional multilevel degenerative changes as described above that  are most pronounced from L3-4 to L5-S1.    An epic secure chat message was sent to ordering provider Liza Salinas PA-C at 8:42 am on 10/6/2024 by Radiology resident Glenna Bhardwaj MD.    MACRO:  Critical Finding:  See findings. Notification was initiated on  10/6/2024 at 8:42 am by  Glenna Bhardwaj.  (**-OCF-**)    Signed by: Kristyn Li 10/6/2024 6:43  PM  Dictation workstation:   [id number]    === 09/19/24 ===    XR HIP RIGHT WITH PELVIS WHEN PERFORMED 2 OR 3 VIEWS    - Impression -  The CT scan of the lumbar spine dated 5/17/2021 had demonstrated a  transverse fracture through L1.  Since that time, there is been loss  of height of L1 with the appearance of moderate wedge compression.  No  spondylolisthesis.  Moderate multilevel degenerative disease and facet  arthritis.  No fracture of the right hip.  Signed by Danilo Rios MD      === 3/6/24 ===    CT CHEST WO IV CONTRAST    IMPRESSION:  1.  Right lung apex anterior indeterminate irregular somewhat  spiculated mass measuring up to 4 cm in craniocaudal dimension not  significantly changed in size or configuration from prior.  2. Small hiatal hernia.  3. Nonacute compression deformity of the L1 vertebral body with  superior endplate depression and anterior wedging again seen as well  as redemonstration of a small Schmorl's node adjacent to the L1-2  disc space. Additional new indeterminate 2 cm ovoid lucent lesion of  the L1 vertebral body posteriorly just right of midline.      MACRO:  None.      Signed by: Maverick Devries 3/7/2024 8:38 AM  Dictation workstation:   [id number]      === 6/7/21 ===    NM PET CT LUNG CA STAGING    IMPRESSION:  1. Nonspecific mild increased metabolic activity within the right  upper lobe spiculated nodule, suggestive of a benign etiology;  recommend follow-up CT chest to document stability or resolution .  2. No evidence of hypermetabolic mediastinal lymphadenopathy  3. Subacute fracture of the L1 vertebral body with mild metabolic  Activity      LABS:  10/28/24 Lab work reviewed.    PAST MEDICAL HISTORY:  Past Medical History:   Diagnosis Date    Cerebral infarction, unspecified 01/25/2022    CVA (cerebral vascular accident)    Essential (primary) hypertension 09/22/2022    HTN (hypertension)    Personal history of other diseases of the circulatory system 12/17/2019     History of atrial septal defect    Personal history of other diseases of the nervous system and sense organs     History of sleep apnea    Sleep apnea        PAST SURGICAL HISTORY:  Past Surgical History:   Procedure Laterality Date    CORONARY ARTERY BYPASS GRAFT  09/01/2016    CABG    MR HEAD ANGIO WO IV CONTRAST  6/28/2016    MR HEAD ANGIO WO IV CONTRAST 6/28/2016 [facility]    MR HEAD ANGIO WO IV CONTRAST  1/19/2021    MR HEAD ANGIO WO IV CONTRAST [facility]    MR NECK ANGIO WO IV CONTRAST  6/28/2016    MR NECK ANGIO WO IV CONTRAST 6/28/2016 [facility]    MR NECK ANGIO WO IV CONTRAST  1/20/2021    MR NECK ANGIO WO IV CONTRAST [facility]    OTHER SURGICAL HISTORY  06/25/2013    Injection Of Neurolytic Substance Epidural Thoracic    OTHER SURGICAL HISTORY  07/25/2022    Heart surgery    OTHER SURGICAL HISTORY  07/25/2022    Knee surgery    OTHER SURGICAL HISTORY  09/01/2016    Shoulder Surgery Left    SHOULDER SURGERY  09/01/2016    Shoulder Surgery Right       SOCIAL HISTORY:  Social Connections: Not on file       FAMILY HISTORY:  No family history on file.    MEDICATIONS:  Current Outpatient Medications on File Prior to Visit   Medication Sig Dispense Refill    acetaminophen (Tylenol 8 HOUR) 650 mg ER tablet Take 2 tablets (1,300 mg) by mouth 3 times a day as needed for mild pain (1 - 3). Do not crush, chew, or split.      apixaban (Eliquis) 5 mg tablet Take 1 tablet (5 mg) by mouth every 12 hours.      aspirin 81 mg chewable tablet Chew 1 tablet (81 mg) once daily.      atorvastatin (Lipitor) 40 mg tablet Take 1 tablet (40 mg) by mouth once daily at bedtime. 90 tablet 3    brimonidine (AlphaGAN P) 0.1 % ophthalmic solution Administer into affected eye(s) twice a day.      cyclobenzaprine (Flexeril) 10 mg tablet Take 1 tablet (10 mg) by mouth 2 times a day as needed for muscle spasms for up to 14 days. 28 tablet 0    gabapentin (Neurontin) 600 mg tablet Take 1 tablet (600 mg) by mouth  3 times a day. 90 tablet 1    losartan-hydrochlorothiazide (Hyzaar) 100-25 mg tablet Take 1 tablet by mouth once daily.      multivit with minerals/lutein (MULTIVITAMIN 50 PLUS ORAL) Take by mouth once daily.      netarsudiL-latanoprost (Rocklatan) 0.02-0.005 % drops Administer into affected eye(s) once daily.      oxyCODONE (Roxicodone) 5 mg immediate release tablet Take 1 tablet (5 mg) by mouth every 8 hours if needed for severe pain (7 - 10) for up to 7 days. 21 tablet 0    predniSONE (Deltasone) 20 mg tablet Take 3 tabs (60mg) daily for 5 days, then take 2 tabs (40mg) daily for 2 days, then take 1 tab (20mg) daily for 2 days. 21 tablet 0    tamsulosin (Flomax) 0.4 mg 24 hr capsule Take 1 capsule (0.4 mg) by mouth once daily.      [] traMADol (Ultram) 50 mg tablet Take 1 tablet (50 mg) by mouth every 8 hours if needed for severe pain (7 - 10) for up to 7 days. 21 tablet 0    traZODone (Desyrel) 50 mg tablet Take 1 tablet (50 mg) by mouth once daily at bedtime. 90 tablet 1    [DISCONTINUED] oxyCODONE (Roxicodone) 5 mg immediate release tablet Take 1 tablet (5 mg) by mouth every 8 hours if needed for severe pain (7 - 10) for up to 3 days. 10 tablet 0     No current facility-administered medications on file prior to visit.         REVIEW OF SYSTEMS:  Review of Systems   Constitutional:  Positive for fatigue and unexpected weight change. Negative for appetite change, chills and fever.   HENT:   Negative for trouble swallowing.         +PND w/ phlegm   Respiratory:  Negative for cough and shortness of breath.         +denies cough/SOB, notes throat clearing   Cardiovascular:  Positive for leg swelling. Negative for chest pain.        +RLE swelling   Gastrointestinal:  Negative for abdominal pain, constipation, nausea and vomiting.   Endocrine:        No night sweats   Musculoskeletal:  Positive for back pain and gait problem.        +approx 2 month course of LBP radiating to right buttock, groin and RLE   Skin:   Negative for rash.   Neurological:  Positive for extremity weakness and gait problem.        +numbness x right foot, w/ burning sensation & paresthesias   Psychiatric/Behavioral:  Positive for sleep disturbance.         +secondary to LBP and RLE pain       OBJECTIVE:  There were no vitals taken for this visit.  Physical exam deferred due to virtual visit.    ASSESSMENT:  Willy Reed is a 79 y.o. year old male w/ PMH CVA, HTN, TOSHIA, atrial septal defect, HPL, OA, BPH, glaucoma, and remote hx of tobacco use, who presents to [facility] with osseous metastatic L1 lesion, w/ concern for primary lung malignancy & known RUL lung mass.    PLAN:  1. Metastatic carcinoma to bone (Multi) (Primary)  2. Lung mass  Concern for primary lung malignancy  -- Repeat CT chest ordered.  -- PET/CT whole body for staging.  -- MRI brain w/wo for staging.  -- Referral to med onc - appt scheduled w/ Dr Boyd on 11/13, will work to arrange all imaging/staging prior to appt.  -- Referral to rad onc - to eval for RT of metastatic disease of lumbar spine.  -- All patient and family questions answered. I provided the patient w/ the contact information for the diagnostic clinic; advised him to call in the interim with any questions/issues.     CHING Andrews.CNP  [facility]

## 2024-11-09 ENCOUNTER — HOSPITAL ENCOUNTER (OUTPATIENT)
Dept: RADIOLOGY | Facility: CLINIC | Age: 79
Discharge: HOME | End: 2024-11-09
Payer: MEDICARE

## 2024-11-09 DIAGNOSIS — C79.51 METASTATIC CARCINOMA TO BONE (MULTI): ICD-10-CM

## 2024-11-09 DIAGNOSIS — R91.8 LUNG MASS: ICD-10-CM

## 2024-11-09 PROCEDURE — 70553 MRI BRAIN STEM W/O & W/DYE: CPT | Performed by: RADIOLOGY

## 2024-11-09 PROCEDURE — 70553 MRI BRAIN STEM W/O & W/DYE: CPT

## 2024-11-09 PROCEDURE — A9575 INJ GADOTERATE MEGLUMI 0.1ML: HCPCS | Performed by: NURSE PRACTITIONER

## 2024-11-09 PROCEDURE — 2550000001 HC RX 255 CONTRASTS: Performed by: NURSE PRACTITIONER

## 2024-11-09 RX ORDER — GADOTERATE MEGLUMINE 376.9 MG/ML
20 INJECTION INTRAVENOUS
Status: COMPLETED | OUTPATIENT
Start: 2024-11-09 | End: 2024-11-09

## 2024-11-09 RX ADMIN — GADOTERATE MEGLUMINE 20 ML: 376.9 INJECTION INTRAVENOUS at 10:15

## 2024-11-11 ENCOUNTER — HOSPITAL ENCOUNTER (OUTPATIENT)
Dept: RADIOLOGY | Facility: CLINIC | Age: 79
Discharge: HOME | End: 2024-11-11
Payer: MEDICARE

## 2024-11-11 DIAGNOSIS — C79.51 METASTATIC CARCINOMA TO BONE (MULTI): ICD-10-CM

## 2024-11-11 DIAGNOSIS — R91.8 LUNG MASS: ICD-10-CM

## 2024-11-11 PROCEDURE — 78815 PET IMAGE W/CT SKULL-THIGH: CPT | Mod: PI

## 2024-11-11 PROCEDURE — 78815 PET IMAGE W/CT SKULL-THIGH: CPT | Performed by: NUCLEAR MEDICINE

## 2024-11-11 PROCEDURE — A9552 F18 FDG: HCPCS | Performed by: NURSE PRACTITIONER

## 2024-11-11 PROCEDURE — 3430000001 HC RX 343 DIAGNOSTIC RADIOPHARMACEUTICALS: Performed by: NURSE PRACTITIONER

## 2024-11-11 RX ORDER — FLUDEOXYGLUCOSE F 18 200 MCI/ML
12.2 INJECTION, SOLUTION INTRAVENOUS
Status: COMPLETED | OUTPATIENT
Start: 2024-11-11 | End: 2024-11-11

## 2024-11-11 NOTE — PROGRESS NOTES
HPI:Willy Reed is an 79-year-old man, who comes in with complaints of right sided low back pain with radiation to the right thigh.  It is rather constant.  He recently had an MRI done which she comes to review today.  He is actually scheduled to have a visit with oncology on the 13th of this month after recent CT-guided biopsy of his L1 vertebrae demonstrated metastatic carcinoma.      ROS:  Reviewed on EMR and patient intake sheet.    PMH/SH:  Reviewed on EMR and patient intake sheet.    Exam:  Physical Exam    Constitutional: Well appearing; no acute distress  Eyes: pupils are equal and round  Psych: normal affect  Respiratory: non-labored breathing  Cardiovascular: regular rate and rhythm  GI: non-distended abdomen  Musculoskeletal: no pain with range of motion of the hips bilaterally  Neurologic: [4+]/5 strength in the lower extremities bilaterally]; [negative] straight leg raise    Radiology:     MRI demonstrates an expansile mass in the L1 vertebral body with extension into the spinal canal, right greater than left.  This produces severe lateral recess stenosis.    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  Patient is scheduled to see oncology on the 13th.  We briefly talked about what surgical invention would require today.  Given the patient's age, stage of life, and now his metastatic cancer which is likely from a lung primary, surgical intervention would be a large undertaking.  I have recommended discussion with the oncologist first to get a better understanding of his big picture in regards to his primary cancer and other possible treatments.  If the patient's prognosis is favorable from his primary cancer, and his symptoms remain intolerable, then surgical intervention would certainly be reasonable at that point.  Patient will follow-up  with me if he would like to pursue surgery in the future.    The patient was in agreement with the plan. At the end of the visit today, the patient felt that all questions had been answered satisfactorily.  The patient was pleased with the visit and very appreciative for the care rendered.     Thank you very much for the kind referral.  It is a privilege, and a pleasure, to partner with you in the care of your patients.  I would be delighted to assist you with any further consultations as needed.          Wilbur Baca MD    Chief of Spine Surgery, [facility]  Director of Spine Service, [facility]  , Department of Orthopaedics  [facility] [employer]  [address] [place] [address]  P: [phone]  [facility] HospitalExclusively.inCastle [place].[employer]    This note was dictated with voice recognition software.  It has not been proofread for grammatical errors, typographical mistakes or other semantic inconsistencies.

## 2024-11-12 ENCOUNTER — TELEPHONE (OUTPATIENT)
Dept: HEMATOLOGY/ONCOLOGY | Facility: HOSPITAL | Age: 79
End: 2024-11-12
Payer: MEDICARE

## 2024-11-12 ENCOUNTER — APPOINTMENT (OUTPATIENT)
Dept: RADIOLOGY | Facility: CLINIC | Age: 79
End: 2024-11-12
Payer: MEDICARE

## 2024-11-13 ENCOUNTER — PATIENT OUTREACH (OUTPATIENT)
Dept: HEMATOLOGY/ONCOLOGY | Facility: CLINIC | Age: 79
End: 2024-11-13
Payer: MEDICARE

## 2024-11-13 ENCOUNTER — OFFICE VISIT (OUTPATIENT)
Dept: HEMATOLOGY/ONCOLOGY | Facility: CLINIC | Age: 79
End: 2024-11-13
Payer: MEDICARE

## 2024-11-13 VITALS
SYSTOLIC BLOOD PRESSURE: 114 MMHG | HEIGHT: 73 IN | OXYGEN SATURATION: 95 % | BODY MASS INDEX: 30.5 KG/M2 | DIASTOLIC BLOOD PRESSURE: 70 MMHG | TEMPERATURE: 98.4 F | WEIGHT: 230.1 LBS | HEART RATE: 67 BPM | RESPIRATION RATE: 18 BRPM

## 2024-11-13 DIAGNOSIS — C79.51 METASTATIC CARCINOMA INVOLVING BONE WITH UNKNOWN PRIMARY SITE (MULTI): ICD-10-CM

## 2024-11-13 DIAGNOSIS — M84.48XA PATHOLOGIC FRACTURE OF LUMBAR VERTEBRA, INITIAL ENCOUNTER: ICD-10-CM

## 2024-11-13 DIAGNOSIS — C79.51 METASTATIC CARCINOMA TO BONE (MULTI): ICD-10-CM

## 2024-11-13 DIAGNOSIS — R91.8 LUNG MASS: ICD-10-CM

## 2024-11-13 DIAGNOSIS — C80.1 METASTATIC CARCINOMA INVOLVING BONE WITH UNKNOWN PRIMARY SITE (MULTI): ICD-10-CM

## 2024-11-13 DIAGNOSIS — S32.010A CLOSED COMPRESSION FRACTURE OF BODY OF L1 VERTEBRA (MULTI): ICD-10-CM

## 2024-11-13 PROCEDURE — 1125F AMNT PAIN NOTED PAIN PRSNT: CPT | Performed by: STUDENT IN AN ORGANIZED HEALTH CARE EDUCATION/TRAINING PROGRAM

## 2024-11-13 PROCEDURE — 1159F MED LIST DOCD IN RCRD: CPT | Performed by: STUDENT IN AN ORGANIZED HEALTH CARE EDUCATION/TRAINING PROGRAM

## 2024-11-13 PROCEDURE — 3078F DIAST BP <80 MM HG: CPT | Performed by: STUDENT IN AN ORGANIZED HEALTH CARE EDUCATION/TRAINING PROGRAM

## 2024-11-13 PROCEDURE — 99215 OFFICE O/P EST HI 40 MIN: CPT | Performed by: STUDENT IN AN ORGANIZED HEALTH CARE EDUCATION/TRAINING PROGRAM

## 2024-11-13 PROCEDURE — 3074F SYST BP LT 130 MM HG: CPT | Performed by: STUDENT IN AN ORGANIZED HEALTH CARE EDUCATION/TRAINING PROGRAM

## 2024-11-13 PROCEDURE — 99417 PROLNG OP E/M EACH 15 MIN: CPT | Performed by: STUDENT IN AN ORGANIZED HEALTH CARE EDUCATION/TRAINING PROGRAM

## 2024-11-13 PROCEDURE — 1123F ACP DISCUSS/DSCN MKR DOCD: CPT | Performed by: STUDENT IN AN ORGANIZED HEALTH CARE EDUCATION/TRAINING PROGRAM

## 2024-11-13 RX ORDER — OXYCODONE HYDROCHLORIDE 5 MG/1
5 TABLET ORAL EVERY 6 HOURS PRN
Qty: 84 TABLET | Refills: 0 | Status: SHIPPED | OUTPATIENT
Start: 2024-11-13 | End: 2024-12-11

## 2024-11-13 SDOH — ECONOMIC STABILITY: INCOME INSECURITY: IN THE LAST 12 MONTHS, WAS THERE A TIME WHEN YOU WERE NOT ABLE TO PAY THE MORTGAGE OR RENT ON TIME?: NO

## 2024-11-13 SDOH — ECONOMIC STABILITY: FOOD INSECURITY: WITHIN THE PAST 12 MONTHS, THE FOOD YOU BOUGHT JUST DIDN'T LAST AND YOU DIDN'T HAVE MONEY TO GET MORE.: NEVER TRUE

## 2024-11-13 SDOH — ECONOMIC STABILITY: TRANSPORTATION INSECURITY
IN THE PAST 12 MONTHS, HAS LACK OF TRANSPORTATION KEPT YOU FROM MEETINGS, WORK, OR FROM GETTING THINGS NEEDED FOR DAILY LIVING?: NO

## 2024-11-13 SDOH — ECONOMIC STABILITY: GENERAL
WHICH OF THE FOLLOWING DO YOU KNOW HOW TO USE AND HAVE ACCESS TO EVERY DAY? (CHOOSE ALL THAT APPLY): DESKTOP COMPUTER, LAPTOP COMPUTER, OR TABLET WITH BROADBAND INTERNET CONNECTION;SMARTPHONE WITH CELLULAR DATA PLAN

## 2024-11-13 SDOH — ECONOMIC STABILITY: TRANSPORTATION INSECURITY
IN THE PAST 12 MONTHS, HAS THE LACK OF TRANSPORTATION KEPT YOU FROM MEDICAL APPOINTMENTS OR FROM GETTING MEDICATIONS?: NO

## 2024-11-13 SDOH — HEALTH STABILITY: PHYSICAL HEALTH
ON AVERAGE, HOW MANY DAYS PER WEEK DO YOU ENGAGE IN MODERATE TO STRENUOUS EXERCISE (LIKE A BRISK WALK)?: PATIENT DECLINED

## 2024-11-13 SDOH — ECONOMIC STABILITY: FOOD INSECURITY: WITHIN THE PAST 12 MONTHS, YOU WORRIED THAT YOUR FOOD WOULD RUN OUT BEFORE YOU GOT MONEY TO BUY MORE.: NEVER TRUE

## 2024-11-13 SDOH — ECONOMIC STABILITY: GENERAL
WHICH OF THE FOLLOWING WOULD YOU LIKE TO GET CONNECTED TO IN ORDER TO RECEIVE A DISCOUNT OR FOR FREE? (CHOOSE ALL THAT APPLY): NO ASSISTANCE NEEDED

## 2024-11-13 SDOH — HEALTH STABILITY: PHYSICAL HEALTH: ON AVERAGE, HOW MANY MINUTES DO YOU ENGAGE IN EXERCISE AT THIS LEVEL?: PATIENT DECLINED

## 2024-11-13 ASSESSMENT — LIFESTYLE VARIABLES
HOW MANY STANDARD DRINKS CONTAINING ALCOHOL DO YOU HAVE ON A TYPICAL DAY: 1 OR 2
AUDIT-C TOTAL SCORE: 1
SKIP TO QUESTIONS 9-10: 1
HOW OFTEN DO YOU HAVE A DRINK CONTAINING ALCOHOL: MONTHLY OR LESS
HOW OFTEN DO YOU HAVE SIX OR MORE DRINKS ON ONE OCCASION: NEVER

## 2024-11-13 ASSESSMENT — SOCIAL DETERMINANTS OF HEALTH (SDOH)
HOW OFTEN DO YOU GET TOGETHER WITH FRIENDS OR RELATIVES?: THREE TIMES A WEEK
IN A TYPICAL WEEK, HOW MANY TIMES DO YOU TALK ON THE PHONE WITH FAMILY, FRIENDS, OR NEIGHBORS?: MORE THAN THREE TIMES A WEEK
WITHIN THE LAST YEAR, HAVE YOU BEEN HUMILIATED OR EMOTIONALLY ABUSED IN OTHER WAYS BY YOUR PARTNER OR EX-PARTNER?: NO
DO YOU BELONG TO ANY CLUBS OR ORGANIZATIONS SUCH AS CHURCH GROUPS UNIONS, FRATERNAL OR ATHLETIC GROUPS, OR SCHOOL GROUPS?: NO
HOW OFTEN DO YOU ATTENT MEETINGS OF THE CLUB OR ORGANIZATION YOU BELONG TO?: NEVER
HOW OFTEN DO YOU ATTEND CHURCH OR RELIGIOUS SERVICES?: NEVER
HOW HARD IS IT FOR YOU TO PAY FOR THE VERY BASICS LIKE FOOD, HOUSING, MEDICAL CARE, AND HEATING?: NOT HARD AT ALL
WITHIN THE LAST YEAR, HAVE TO BEEN RAPED OR FORCED TO HAVE ANY KIND OF SEXUAL ACTIVITY BY YOUR PARTNER OR EX-PARTNER?: NO
WITHIN THE LAST YEAR, HAVE YOU BEEN KICKED, HIT, SLAPPED, OR OTHERWISE PHYSICALLY HURT BY YOUR PARTNER OR EX-PARTNER?: NO
IN THE PAST 12 MONTHS, HAS THE ELECTRIC, GAS, OIL, OR WATER COMPANY THREATENED TO SHUT OFF SERVICE IN YOUR HOME?: NO
WITHIN THE LAST YEAR, HAVE YOU BEEN AFRAID OF YOUR PARTNER OR EX-PARTNER?: NO

## 2024-11-13 ASSESSMENT — PAIN SCALES - GENERAL: PAINLEVEL_OUTOF10: 4

## 2024-11-13 NOTE — PROGRESS NOTES
"Patient ID: Willy Reed \"Harpal\" is a 79 y.o. 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     SITES OF DISEASE  3.1 x 2.0 cm R lung apical mass  8 mm RLL nodule  L1 vertebral body      MOLECULAR GENOMICS  TPS PD-L1<1%    Positive for AE1/AE3  Positive for EMA  Positive for TTF1 (8G7G3 clone)    Negative for GFAP  Negative for CDX2  Negative for PAX8   Negative for NKX3.1     PRIOR THERAPIES  None     CURRENT THERAPY      CURRENT ONCOLOGICAL PROBLEMS  -L1 vertebral body mass     HISTORY OF PRESENT ILLNESS  79 year old male w/ PMH 3xCVA (1999, 2014, 2019), HTN, TOSHIA, atrial septal defect, HLD, OA, BPH, glaucoma, new dx of metastatic lung adenocarcinoma (L1 vertebral body, RUL, RLL) presenting to establish care.    Originally presented 9/30/2024 with 4 weeks of LBP and RLE pain. Underwent MRI lumbar spine 10/3/2024 with L1 vertebral body lesion concerning for malignancy. 10/16/24: PET/CT with increased uptake in L1. 10/31/24 L1 vertebral body biopsy with pathology concerning for pulmonary primary, TTF1 positive c/w lung adenocarcinoma.    11/8/24 CT chest with 8mm RLL nodule (previously 3mm 3/2024), 3.1 x 2.0 cm R lung apex mass    11/9/24 MRI brain negative    11/11/24 PET/CT with stable lung masses    Presenting 11/13/24 to establish care    PAST MEDICAL HISTORY  3xCVA (1999, 2014, 2019), HTN, TOSHIA, atrial septal defect, HLD, OA, BPH, glaucoma    SURGICAL HISTORY  ASD repair 1999  R knee, R shoulder, L shoulder arthroplasty     SOCIAL HISTORY  Lives with wife, 2 children. 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 Denies recreational drug use.     FAMILY HISTORY  Mother - lymphoma, passed 57 from lymphoma  Brother - thyroid cancer metastatic to lung, passed at 83 from cancer  Maternal uncle - cancer " unspecified  Daughter - skin cancer    CURRENT MEDS REVIEWED       ALLERGIES REVIEWED        SUBJECTIVE:  Here today with his wife.    Back pain started ~8/2024. Originally given spinal injections that relieved the pain initially, but returned after a few days. He then underwent thorough workup as above, L1 vertebral biopsy positive for lung primary, likely adenocarcinoma given TTF1 marker. Prior to diagnosis, he was very functional - able to ride his tractor, shop, do yard work. Had multiple falls at home due to instability, likely mechanical etiology, last fall a few months ago.    Current activities are severely limited by pain. Back pain 10/10 at its worst, 3/10 with oxycodone. Taking oxycodone every 8 hours, willing to try to increase to every 6 hours. Unable to stand for long periods of time or lie flat due to pain, currently sleeping in recliner. Regular bowel movements now with miralax. Appetite improving, 5lb weight loss since 8/2024. Denies fevers/chills shortness of breath, chest pain, n/v/d, bowel/bladder incontinence.    A 13 point review of systems was performed, with significant findings documented above in subjective history.    OBJECTIVE:  Vitals:    11/13/24 1400   BP: 114/70   Pulse: 67   Resp: 18   Temp: 36.9 °C (98.4 °F)   SpO2: 95%      Body surface area is 2.31 meters squared.     Wt Readings from Last 5 Encounters:   11/13/24 104 kg (230 lb 1.6 oz)   11/08/24 106 kg (233 lb)   11/04/24 106 kg (233 lb 11 oz)   09/30/24 107 kg (235 lb)   10/03/24 106 kg (234 lb)       ECOGSCORE: 3- Capable of only limited selfcare, confined to bed/chair > 50% of waking hrs.  GEN: pleasant, well appearing, in NAD, sitting in wheel chair  EYES: PERRLA, EOMI  CV: RRR, normal S1/S2, no m/r/g  PULM: CTAB  ABD: soft, nontender, nondistended  NEURO: no FND  PSYCH: appropriate mood and affect  MSK: no joint swelling grossly noted  EXT: 1+ pitting LE edema up to the shins bilaterally  SKIN: warm and dry, no lesions  grossly noted    Diagnostic Results   Results:  Labs:  Lab Results   Component Value Date    WBC 10.2 10/28/2024    HGB 15.2 10/28/2024    HCT 46.2 10/28/2024    MCV 92 10/28/2024     10/28/2024      Lab Results   Component Value Date    NEUTROABS 12.36 (H) 05/17/2021        Lab Results   Component Value Date    GLUCOSE 113 (H) 02/14/2023    CALCIUM 9.1 02/14/2023     02/14/2023    K 4.3 02/14/2023    CO2 29 02/14/2023     02/14/2023    BUN 15 02/14/2023    CREATININE 1.02 02/14/2023    MG 2.2 01/19/2021     Lab Results   Component Value Date    ALT 24 02/14/2023    AST 21 02/14/2023    ALKPHOS 66 02/14/2023    BILITOT 1.3 (H) 02/14/2023      Lab Results   Component Value Date    TSH 2.63 01/25/2022     PET 11/11/24    IMPRESSION:  1. Mildly hypermetabolic right apical lung mass, which is stable  compared to prior imaging, although a neoplastic process can not be  excluded.  2. Hypermetabolic L1 vertebral body, consistent with bone metastasis.  3. No hypermetabolic devonte metastasis.      Assessment/Plan   Mr. Harpal Reed is an 78 YO with a PMH of 3xCVA (1999, 2014, 2019), HTN, TOSHIA, atrial septal defect, HLD, OA, BPH, glaucoma seen as initial consultation for Stage JUMA (bL1LmN5x) adenocarcinoma of RUL.     We discussed the natural history of metastatic NSCLC both with and without treatment. Patient has significant pain related to his L1 lesion but no alarm symptoms. He is a PD-L1 low expressor but given L1 lesion he is not fit or functional enough for combination platinum therapy and immunotherapy. Aside from L1 he does not have a large burden of disease. We will refer him to rad onc for expedited treatment of L1 and follow upon completion to discuss systemic therapy options.    #Stage JUMA ([id number]) adenocarcinoma of RUL   -Sites of disease include L1 vertebra, RUL, RLL nodule  -Primary issue is back pain caused by metastatic lesion severely limiting functional status. Scheduled for rad/onc  appointment 11/21/24  -Increase to oxycodone 5mg q6h for back pain  -Ideally would treat with carbo/pem/pem but with poor functional status, would consider single agent pembrolizumab pending mutational analysis  -NGS of bone biopsy sent  -RTC in 3 weeks following discussion of radiation treatment to discuss NGS, pembrolizumab    Patient seen and discussed with Dr. Oliver Stevens MD  IM PGY-2    I saw and evaluated the patient. I personally obtained the key and critical portions of the history and physical exam or was physically present for key and critical portions performed by the resident/fellow. I reviewed the resident/fellow's documentation and discussed the patient with the resident/fellow. I agree with the resident/fellow's medical decision making as documented in the note.     Maverick Boyd MD  Thoracic Medical Oncology   [address] [place] [id number]  Phone: [phone]

## 2024-11-13 NOTE — PROGRESS NOTES
"Radiation Oncology Outpatient Consult    Patient Name:  Willy Reed  MRN:  [id number]  :  1945    Referring Provider: No ref. provider found  Primary Care Provider: MYNOR Longoria  Care Team: Patient Care Team:  MYNOR Longoria as PCP - General (Family Medicine)  Keily Phelps MD as Primary Care Provider  Maverick Boyd MD as Consulting Physician (Hematology and Oncology)    Date of Service: 2024     SUBJECTIVE  History of Present Illness:  Willy Reed \"Harpal\" is a 79 y.o. male who was referred by Maverick Boyd for a consultation to the [facility] Department of Radiation Oncology.  He is presenting for evaluation and management of newly diagnosed, Stage JUMA, T2 (3.1 cm RUL mass) vs T4 (RUL mass and RLL nodule), Nx, M1b (L1 vertebral body), NSCLC, adenocarcinoma, TPS PD-L1 < 1%, with complaints of low back pain and right lower extremity pain.     Per chart review, the patient presented to the urgent care clinic on 2024 with complaints of back pain.  An x-ray of the lumbar spine was obtained on 2024 and showed moderate degenerative changes in the right hip without fracture or dislocation.  There was also an L1 compression fracture with approximately 50% loss of body height.  An MRI of the lumbar spine was acquired on 10/03/2024 and it showed an L1 compression deformity with retropulsion into the spinal canal and associated conus medullaris and cauda equina nerve root compression at L1 level.  This was also associated with a severe right L1-2 neural foraminal stenosis.  In addition and associated expansile lesion within the L1 vertebral body extending to the right pedicle with intralesional vascular structures and adjacent right lamina and superior facet marrow edema.  The patient then underwent a CT-guided percutaneous biopsy on 10/31/2024, pathology of which came back as metastatic carcinoma, consistent with a " spread from a pulmonary primary, TPS PD-L1 less than 1%, positive for AE1/AE3, EMA and TTF-1.  MRI of the brain was acquired on 11/9/2024 and showed no suggestion of intracranial metastasis.  A PET scan was acquired on 11/11/20/2024, it showed a mildly hypermetabolic right apical lung mass, hypermetabolic L1 vertebral body lesion consistent with bone metastasis, and the right lower lung soft tissue nodule with minimal metabolic activity and the maximum SUV of 1.0.  The patient has met with our colleagues from medical oncology and discussion was to consider first-line pembrolizumab.  Given his back pain the patient was referred to the radiation oncology clinic for further evaluation.    Today's interview was performed virtually with the presence of the patient, his wife, and his niece.  The patient reports that he is active at home and is able to do his activities of daily living.  He denies any chest pain or shortness of breath.  He denied any urinary or stool incontinence.  He  reported some bilateral lower extremities numbness.      Prior Radiotherapy:  No radiation treatments to show. (Treatments may have been administered in another system.)       Past Medical History:    Past Medical History:   Diagnosis Date    Cerebral infarction, unspecified 01/25/2022    CVA (cerebral vascular accident)    Essential (primary) hypertension 09/22/2022    HTN (hypertension)    Personal history of other diseases of the circulatory system 12/17/2019    History of atrial septal defect    Personal history of other diseases of the nervous system and sense organs     History of sleep apnea    Sleep apnea         Past Surgical History:    Past Surgical History:   Procedure Laterality Date    CORONARY ARTERY BYPASS GRAFT  09/01/2016    CABG    MR HEAD ANGIO WO IV CONTRAST  6/28/2016    MR HEAD ANGIO WO IV CONTRAST 6/28/2016 [facility]    MR HEAD ANGIO WO IV CONTRAST  1/19/2021    MR HEAD ANGIO WO IV CONTRAST [facility]     MR NECK ANGIO WO IV CONTRAST  2016    MR NECK ANGIO WO IV CONTRAST 2016 [facility]    MR NECK ANGIO WO IV CONTRAST  2021    MR NECK ANGIO WO IV CONTRAST [facility]    OTHER SURGICAL HISTORY  2013    Injection Of Neurolytic Substance Epidural Thoracic    OTHER SURGICAL HISTORY  2022    Heart surgery    OTHER SURGICAL HISTORY  2022    Knee surgery    OTHER SURGICAL HISTORY  2016    Shoulder Surgery Left    SHOULDER SURGERY  2016    Shoulder Surgery Right        Family History:  Cancer-related family history is not on file.    Social History:    Social History     Tobacco Use    Smoking status: Former     Current packs/day: 0.00     Average packs/day: 0.8 packs/day for 10.7 years (8.0 ttl pk-yrs)     Types: Cigarettes     Start date: 1962     Quit date: 1972     Years since quittin.2     Passive exposure: Never    Smokeless tobacco: Never   Vaping Use    Vaping status: Never Used   Substance Use Topics    Alcohol use: Yes     Alcohol/week: 1.0 standard drink of alcohol     Types: 1 Cans of beer per week     Comment: rare    Drug use: Never       Allergies:    Allergies   Allergen Reactions    Morphine Nausea And Vomiting        Medications:    Current Outpatient Medications:     acetaminophen (Tylenol 8 HOUR) 650 mg ER tablet, Take 2 tablets (1,300 mg) by mouth 3 times a day as needed for mild pain (1 - 3). Do not crush, chew, or split., Disp: , Rfl:     apixaban (Eliquis) 5 mg tablet, Take 1 tablet (5 mg) by mouth every 12 hours., Disp: , Rfl:     aspirin 81 mg chewable tablet, Chew 1 tablet (81 mg) once daily., Disp: , Rfl:     atorvastatin (Lipitor) 40 mg tablet, Take 1 tablet (40 mg) by mouth once daily at bedtime., Disp: 90 tablet, Rfl: 3    brimonidine (AlphaGAN P) 0.1 % ophthalmic solution, Administer into affected eye(s) twice a day., Disp: , Rfl:     gabapentin (Neurontin) 600 mg tablet, Take 1 tablet (600 mg) by mouth 3 times a day.,  Disp: 90 tablet, Rfl: 1    losartan-hydrochlorothiazide (Hyzaar) 100-25 mg tablet, Take 1 tablet by mouth once daily., Disp: , Rfl:     multivit with minerals/lutein (MULTIVITAMIN 50 PLUS ORAL), Take by mouth once daily., Disp: , Rfl:     netarsudiL-latanoprost (Rocklatan) 0.02-0.005 % drops, Administer into affected eye(s) once daily., Disp: , Rfl:     oxyCODONE (Roxicodone) 5 mg immediate release tablet, Take 1 tablet (5 mg) by mouth every 6 hours if needed for severe pain (7 - 10) for up to 28 days., Disp: 84 tablet, Rfl: 0    tamsulosin (Flomax) 0.4 mg 24 hr capsule, Take 1 capsule (0.4 mg) by mouth once daily., Disp: , Rfl:     traZODone (Desyrel) 50 mg tablet, Take 1 tablet (50 mg) by mouth once daily at bedtime., Disp: 90 tablet, Rfl: 1      Review of Systems:      Prior Radiotherapy:  No  No radiation treatments to show. (Treatments may have been administered in another system.)      Current Systemic Treatment:  No     Presence of Pacemaker or ICD:  No     History of Autoimmune or Connective Tissue Disorders:  No     Pain: The patient's current pain level was assessed.  They report currently having a pain of 3 out of 10.  They feel their pain is under control with the use of pain medications.     Review of Systems:  Review of Systems   Constitutional:  Positive for unexpected weight change (drop 5 lbs). Negative for appetite change, chills, diaphoresis, fatigue and fever.   HENT:   Positive for hearing loss (elin aids). Negative for lump/mass, mouth sores, nosebleeds, sore throat, tinnitus, trouble swallowing and voice change.    Eyes: Negative.    Respiratory: Negative.          Sleep Apnea /cpap   Cardiovascular: Negative.    Gastrointestinal:  Negative for abdominal distention, abdominal pain, blood in stool, constipation, diarrhea, nausea, rectal pain and vomiting.   Endocrine: Negative.    Genitourinary: Negative.     Musculoskeletal:  Positive for arthralgias, back pain (shoulder pain limited rom rt  side) and gait problem. Negative for flank pain, myalgias, neck pain and neck stiffness.   Skin: Negative.    Neurological:  Positive for gait problem and numbness (rt leg pain with numbness and increase pain). Negative for dizziness, extremity weakness, headaches, light-headedness, seizures and speech difficulty.   Hematological:  Negative for adenopathy. Bruises/bleeds easily (blood thinner/asa).   Psychiatric/Behavioral: Negative.       Performance Status:  The Karnofsky performance scale today is 80, Normal activity with effort; some signs or symptoms of disease (ECOG equivalent 1).        OBJECTIVE  There were no vitals taken for this visit.   Physical Exam could not be completed due to the virtual aspect of this visit.      Laboratory Review:  There are no laboratory contraindications to radiation therapy.    The pertinent lab results were reviewed and discussed with the patient.  Notably,   Last BMP:    Lab Results   Component Value Date/Time    GLUCOSE 113 (H) 02/14/2023 0840     02/14/2023 0840    K 4.3 02/14/2023 0840     02/14/2023 0840    CO2 29 02/14/2023 0840    ANIONGAP 11 02/14/2023 0840    BUN 15 02/14/2023 0840    CREATININE 1.02 02/14/2023 0840    EGFR 100 01/19/2021 0601    CALCIUM 9.1 02/14/2023 0840     Last CBC w. Diff.:    Lab Results   Component Value Date/Time    WBC 10.2 10/28/2024 0926    NRBC 0.0 10/28/2024 0926    RBC 5.04 10/28/2024 0926    HGB 15.2 10/28/2024 0926    HCT 46.2 10/28/2024 0926    MCV 92 10/28/2024 0926    MCH 30.2 10/28/2024 0926    MCHC 32.9 10/28/2024 0926    RDW 14.0 10/28/2024 0926     10/28/2024 0926    MPV 12.1 01/19/2021 0601    NEUTOPHILPCT 84.8 05/17/2021 0300    IGPCT 0.3 05/17/2021 0300    LYMPHOPCT 10.8 05/17/2021 0300    LYMPHOPCT 31.80 01/19/2021 0601    MONOPCT 3.8 05/17/2021 0300    MONOPCT 5.60 01/19/2021 0601    EOSPCT 0.2 05/17/2021 0300    BASOPCT 0.1 05/17/2021 0300    NEUTROABS 12.36 (H) 05/17/2021 0300    IGABSOL 0.01 01/19/2021  0601    LYMPHSABS 1.58 05/17/2021 0300    MONOSABS 0.56 05/17/2021 0300    EOSABS 0.03 05/17/2021 0300    BASOSABS 0.02 05/17/2021 0300       Pathology Review:  The pertinent pathology results were reviewed and discussed with the patient.  Notably,     Collected 10/31/2024 09:09       Status: Edited Result - FINAL       Visible to patient: Yes (seen)    0 Result Notes      Component    FINAL DIAGNOSIS      L1 vertebral body, biopsy:  -- Metastatic carcinoma involving bone; see note.  -- Immunohistochemical stains performed on formalin-fixed, paraffin embedded sections demonstrate neoplastic cells to be positive for AE1/AE3, EMA and TTF1 (8G7G3 clone), and negative for GFAP, CDX2, PAX8 and NKX3.1.     Note: The morphologic and immunophenotypic features are most consistent with spread from a pulmonary primary; however, other primary sites cannot be entirely ruled out. Correlation with clinical and radiologic features is needed for further evaluation. Additional immunostain for PD-L1 will be performed and reported in an addendum. Next generation sequencing (NGS) will be performed and reported separately.              Imaging:  The pertinent imaging results were reviewed and discussed with the patient.  Notably,     === 11/09/24 ===    MR BRAIN W AND WO CONTRAST    - Impression -  1. No intracranial metastases.  2. Chronic intracranial findings as above including an old infarcts  in the right inferior cerebellum, left frontal lobe, and left  parietal lobe including within the postcentral gyrus.    I personally reviewed the images/study and I agree with the findings  as stated. This study was interpreted at [facility], [place], [place].    MACRO:  None    Signed by: Rgeulo Choi 11/9/2024 11:23 AM  Dictation workstation:   [id number]      === 11/08/24 ===    CT CHEST WO IV CONTRAST    - Impression -  Spiculated anteromedial right apical lung mass is grossly stable  compared to  "multiple priors.    However, there is a new small but suspicious 8 mm paraspinal right  lower lobe lung nodule, suspicious for a new lung metastasis.    Stable linear scarring at the lung bases.    No suspicious thoracic adenopathy in  this unenhanced exam.    Destructive lytic bone lesion at L1 vertebral body with compression  fracture at that level. The destructive lytic lesion is progressed  since prior CT scan dated 03/06/2024 but is similar to how it  appeared on MRI of the lumbar spine dated 10/03/2024. No change in  the degree of compression fracture since the MR I.    Old bilateral rib fractures as described.    Prior CABG.    Mild cholelithiasis. Small liver cysts.    MACRO:  None    Signed by: Jus Torre 11/8/2024 2:58 PM  Dictation workstation:   [id number]     MRI Lumbar Spine:    IMPRESSION:  1. L1 compression deformity with retropulsion into the spinal canal  and associated conus medullaris and cauda equina nerve root  compression at L1 level as well as severe right L1-2 neural foraminal  stenosis. Similar degree of approximately 50% vertebral body height  loss.  2. Associated expansile lesion within the L1 vertebral body extending  into the right pedicle with intralesional vascular structures and  adjacent right lamina and superior facet marrow edema. This may  represent an intraosseous chronic hematoma or a posttraumatic  vascular malformation with neoplastic involvement not excluded.  3. Additional multilevel degenerative changes as described above that  are most pronounced from L3-4 to L5-S1.      An epic secure chat message was sent to ordering provider Liza Salinas PA-C at 8:42 am on 10/6/2024 by Radiology resident Glenna Bhardwaj MD.      MACRO:  Critical Finding:  See findings. Notification was initiated on  10/6/2024 at 8:42 am by  Glenna Bhardwaj.  (**-OCF-**)      Signed by: Kristyn Li 10/6/2024 6:43 PM    ASSESSMENT:   Willy Reed \"Harpal\" is a 79 y.o. male who was " referred by Maverick Boyd for a consultation to the [place] Department of Radiation Oncology.  He is presenting for evaluation and management of newly diagnosed, Stage JUMA, T2 (3.1 cm RUL mass) vs T4 (RUL mass and RLL nodule), Nx, M1b (L1 vertebral body), NSCLC, adenocarcinoma, TPS PD-L1 < 1%, with complaints of low back pain and right lower extremity pain.     During today's interview, we went with the patient over his images and we showed him virtually the lesion in the lumbar spine.  We then explained that he has metastatic disease, the mainstream treatment of which is usually systemic therapy.  We then explained the role of metastasis directed therapy and palliation of symptoms.    We then went over the general treatment options for patient with spine metastases that include palliative and supportive care, surgery, radiation, or combination.  Given the patient's functional status, the new diagnosis, the availability of systemic therapy, and the radiological appearance of the lumbar lesion we spoke about the role of SBRT and delivering high-dose radiation in 3-5 sessions.    We spoke to the role of SBRT in the setting is palliation and not cure, with the potential to stop further growth of the disease, hopefully induced shrinkage, and provide some pain relief.  We then spoke about the acute and late side effects of SBRT which include and are not limited to fatigue, nausea, bowel toxicity that can affect with diarrhea and sometimes blood in stools, kidney toxicity that can manifest with worsening kidney function, bone fracture, spinal cord toxicity, and theoretical risk of secondary malignancy.    The patient and his family understood the benefits and side effects as detailed above.  They decided to proceed with SBRT.  We will schedule him for a repeat MRI of the lumbar spine, and a CT SIM.  We will plan on delivering 27-30 Gray in 3 fractions.  The patient will sign the consent  when he will come to the CT SIM.    IDENTIFYING DATA:  Cancer Staging   No matching staging information was found for the patient.    Problem List Items Addressed This Visit       Secondary cancer of bone (Multi) - Primary    Relevant Orders    Rad Onc Intent to Treat    MR lumbar spine w and wo IV contrast    Acute midline low back pain without sciatica        PLAN:     [ ] Schedule CT SIM for lumbar spine SBRT.  [ ] Repeat MRI of the lumbar spine with and without gadolinium  [ ] Given the virtual aspect of the visit today, consent to be signed at the day of SBRT.  [ ] Consider delivering 27 - 30 Gray in 3 fractions.  [ ] Patient to follow-up with medical oncology afterwards, and consideration of consolidation chest RT in the future based on the response for systemic therapy.      NCCN Guidelines were applicable to guide this patients treatment plan.   Greta Huang MD PhD

## 2024-11-13 NOTE — TELEPHONE ENCOUNTER
11/13/2024 @ 1038:  In basket message sent to Dr. Negrete, regarding patient's request for a sooner radiation oncology appointment, due to patient's right hip and low back pain.      Update sent to CHING Andrews-LEO.    Laura Betancourt RN  [facility] - RN Care Coordinator  Office:  [phone]

## 2024-11-13 NOTE — TELEPHONE ENCOUNTER
2024 @ 1104:  Received call from the patient's niece, Melody Reed, asking if she could be added to the patient's profile for sharing medical information, as the patient often calls her with questions and/or fpr assistance.  She states she lives 1 mile away from the patient and that she often assists with appointments, clarification of medical information, etc...  Advised Melody that she is not currently listed as an emergency contact or as a family member who may receive information regarding the patient's medical condition or his appointment information; however, if the patient would like her to be added on, we can certainly do that.       I then spoke with the patient, whom I had previously spoken with; identity confirmed by name and .  Patient reviewed his recent imaging appointments; stated appreciation to have completed all of the testing needed prior to his medical oncology consultation with Dr. Boyd tomorrow.  Patient inquired about whom he should reach out to regarding pain medication refills, stating he has enough medication to last him through tomorrow.  Advised patient to discuss pain medication and needed refills with Dr. Boyd at his appt tomorrow.    Patient stated that he would like his medical information and appointment information to be shared with his niece, Melody, at any time.  Patient stated that Melody helps him and his wife, a lot and he wants her to be updated on his condition.  He stated he would also like her listed as an emergency contact and for her to be notified upon his hospital admission.   Updates added to patient's contact information in Epic; Melody's contact information was confirmed upon read back.    Explained to the patient that he is welcome to have Melody attend his appointments; she may also attend via speakerphone, if she is unable to attend in person.  Advised patient to inform his providers and nurses, at the beginning of any appt, if he would like his  sarahece to attend an appointment via speakerphone.    Patient asked if I could update the phone number listed for him, so that his cell phone is the first number called;  currently his home phone number is listed as the preferred number in Epic.  Update made in Epic; mobile and home phone numbers confirmed by patient with read back.     Patient asked if it would be possible to arrange a sooner radiation oncology appt with Dr. Negrete, due to his right hip and low back pain.  His NPV is currently scheduled for 11/21/2024.  Advised that I will reach out to our scheduling team and to Dr. Negrete, if needed.    Patient and his niece voiced appreciation and understanding of all aforementioned information and awareness to call with further questions or concerns.  Reviewed, with patient and his niece, that following Dr. Boyd's appointment tomorrow, Dr. Boyd and his team should be their first point of contact for questions and concerns; rationale explained.  Patient and his niece voiced understanding.    Update sent to MYNOR Andrews and to our [facility] Scheduling team.    Laura Betancourt RN  [facility] - RN Care Coordinator  Office:  [phone]

## 2024-11-14 ENCOUNTER — HOSPITAL ENCOUNTER (OUTPATIENT)
Dept: RADIATION ONCOLOGY | Facility: CLINIC | Age: 79
Setting detail: RADIATION/ONCOLOGY SERIES
Discharge: HOME | End: 2024-11-14
Payer: MEDICARE

## 2024-11-14 DIAGNOSIS — C79.51 SECONDARY CANCER OF BONE (MULTI): Primary | ICD-10-CM

## 2024-11-14 DIAGNOSIS — M54.50 ACUTE MIDLINE LOW BACK PAIN WITHOUT SCIATICA: ICD-10-CM

## 2024-11-14 PROCEDURE — 99417 PROLNG OP E/M EACH 15 MIN: CPT | Mod: 95 | Performed by: INTERNAL MEDICINE

## 2024-11-14 PROCEDURE — 99205 OFFICE O/P NEW HI 60 MIN: CPT | Performed by: INTERNAL MEDICINE

## 2024-11-14 PROCEDURE — 99215 OFFICE O/P EST HI 40 MIN: CPT | Mod: 95 | Performed by: INTERNAL MEDICINE

## 2024-11-14 ASSESSMENT — ENCOUNTER SYMPTOMS
HEADACHES: 0
EXTREMITY WEAKNESS: 0
FEVER: 0
RECTAL PAIN: 0
FATIGUE: 0
CARDIOVASCULAR NEGATIVE: 1
ADENOPATHY: 0
ABDOMINAL PAIN: 0
CHILLS: 0
TROUBLE SWALLOWING: 0
VOMITING: 0
CONSTIPATION: 0
DIAPHORESIS: 0
VOICE CHANGE: 0
APPETITE CHANGE: 0
PSYCHIATRIC NEGATIVE: 1
ARTHRALGIAS: 1
BLOOD IN STOOL: 0
UNEXPECTED WEIGHT CHANGE: 1
BACK PAIN: 1
SEIZURES: 0
NAUSEA: 0
SPEECH DIFFICULTY: 0
NECK PAIN: 0
DIARRHEA: 0
RESPIRATORY NEGATIVE: 1
ENDOCRINE NEGATIVE: 1
DIZZINESS: 0
BRUISES/BLEEDS EASILY: 1
ABDOMINAL DISTENTION: 0
NUMBNESS: 1
SORE THROAT: 0
FLANK PAIN: 0
NECK STIFFNESS: 0
LIGHT-HEADEDNESS: 0
EYES NEGATIVE: 1
MYALGIAS: 0

## 2024-11-14 ASSESSMENT — PAIN SCALES - GENERAL: PAINLEVEL_OUTOF10: 3

## 2024-11-14 NOTE — PROGRESS NOTES
Radiation Oncology Nursing Note    Prior Radiotherapy:  No  No radiation treatments to show. (Treatments may have been administered in another system.)     Current Systemic Treatment:  No     Presence of Pacemaker or ICD:  No    History of Autoimmune or Connective Tissue Disorders:  No    Pain: The patient's current pain level was assessed.  They report currently having a pain of 3 out of 10.  They feel their pain is under control with the use of pain medications.    Review of Systems:  Review of Systems   Constitutional:  Positive for unexpected weight change (drop 5 lbs). Negative for appetite change, chills, diaphoresis, fatigue and fever.   HENT:   Positive for hearing loss (elin aids). Negative for lump/mass, mouth sores, nosebleeds, sore throat, tinnitus, trouble swallowing and voice change.    Eyes: Negative.    Respiratory: Negative.          Sleep Apnea /cpap   Cardiovascular: Negative.    Gastrointestinal:  Negative for abdominal distention, abdominal pain, blood in stool, constipation, diarrhea, nausea, rectal pain and vomiting.   Endocrine: Negative.    Genitourinary: Negative.     Musculoskeletal:  Positive for arthralgias, back pain (shoulder pain limited rom rt side) and gait problem. Negative for flank pain, myalgias, neck pain and neck stiffness.   Skin: Negative.    Neurological:  Positive for gait problem and numbness (rt leg pain with numbness and increase pain). Negative for dizziness, extremity weakness, headaches, light-headedness, seizures and speech difficulty.   Hematological:  Negative for adenopathy. Bruises/bleeds easily (blood thinner/asa).   Psychiatric/Behavioral: Negative.

## 2024-11-15 LAB
ELECTRONICALLY SIGNED BY: NORMAL
FOCUSED SOLID TUMOR DNA/RNA RESULTS: NORMAL

## 2024-11-18 ENCOUNTER — HOSPITAL ENCOUNTER (OUTPATIENT)
Dept: RADIOLOGY | Facility: HOSPITAL | Age: 79
Discharge: HOME | End: 2024-11-18
Payer: MEDICARE

## 2024-11-18 DIAGNOSIS — C79.51 SECONDARY CANCER OF BONE (MULTI): ICD-10-CM

## 2024-11-18 DIAGNOSIS — C79.51 SECONDARY CANCER OF BONE (MULTI): Primary | ICD-10-CM

## 2024-11-18 PROCEDURE — 2550000001 HC RX 255 CONTRASTS: Performed by: INTERNAL MEDICINE

## 2024-11-18 PROCEDURE — A9575 INJ GADOTERATE MEGLUMI 0.1ML: HCPCS | Performed by: INTERNAL MEDICINE

## 2024-11-18 PROCEDURE — 72158 MRI LUMBAR SPINE W/O & W/DYE: CPT

## 2024-11-18 PROCEDURE — 72158 MRI LUMBAR SPINE W/O & W/DYE: CPT | Performed by: RADIOLOGY

## 2024-11-18 RX ORDER — GADOTERATE MEGLUMINE 376.9 MG/ML
20 INJECTION INTRAVENOUS
Status: COMPLETED | OUTPATIENT
Start: 2024-11-18 | End: 2024-11-18

## 2024-11-19 ENCOUNTER — HOSPITAL ENCOUNTER (OUTPATIENT)
Dept: RADIATION ONCOLOGY | Facility: HOSPITAL | Age: 79
Setting detail: RADIATION/ONCOLOGY SERIES
Discharge: HOME | End: 2024-11-19
Payer: MEDICARE

## 2024-11-19 ENCOUNTER — HOSPITAL ENCOUNTER (OUTPATIENT)
Dept: RADIOLOGY | Facility: EXTERNAL LOCATION | Age: 79
Discharge: HOME | End: 2024-11-19

## 2024-11-19 ENCOUNTER — PREP FOR PROCEDURE (OUTPATIENT)
Dept: RADIATION ONCOLOGY | Facility: CLINIC | Age: 79
End: 2024-11-19
Payer: MEDICARE

## 2024-11-19 ENCOUNTER — APPOINTMENT (OUTPATIENT)
Dept: RADIOLOGY | Facility: HOSPITAL | Age: 79
End: 2024-11-19
Payer: MEDICARE

## 2024-11-19 ENCOUNTER — APPOINTMENT (OUTPATIENT)
Dept: RADIOLOGY | Facility: CLINIC | Age: 79
End: 2024-11-19
Payer: MEDICARE

## 2024-11-19 ENCOUNTER — TUMOR BOARD CONFERENCE (OUTPATIENT)
Dept: HEMATOLOGY/ONCOLOGY | Facility: CLINIC | Age: 79
End: 2024-11-19
Payer: MEDICARE

## 2024-11-19 DIAGNOSIS — C79.51 METASTASIS TO BONE (MULTI): ICD-10-CM

## 2024-11-19 DIAGNOSIS — C79.51 METASTASIS TO BONE (MULTI): Primary | ICD-10-CM

## 2024-11-19 NOTE — TUMOR BOARD NOTE
Patient Name: ANSELMO BECKFORD  MRN: [id number]  Physician: Oliver  Date of Collection: 10-  Report Date: 11.8.2024  Primary Location of Tumor: Right [place]  Histology: Metastatic Carcinoma  Stage: IV  Location of Metastasis: Bone  PDL 1: < 1%  Actionable Alteration: EGFR p.L858R (exon 21)    Disease Relevant Alterations: EGFR p.L858R ([id number])      Recommendations:  afatinib,dacomitinib,erlotinib,erlotinib+bevacizumab,erlotinib+ramucirumab,gefitinib,lazertinib+amivantamab-vmjw,osimertinib    Standard of Care:+/-Chemotherapy    Clinical Trials (First line):   DO9502([id number]):Randomized Phase III Study of Combination [id number] (Osimertinib) and Bevacizumab Versus [id number] (Osimertinib) Alone as First-Line Treatment for Patients With Metastatic EGFR-Mutant Non-Small Cell Lung Cancer (NSCLC)    PYO0900(Add Trial Number):A Phase 2, Open Label, Randomized Trial Evaluating the impact of enhanced versus standard dermatologic management on selected dermatologic adverse events among patients with locally advanced or metastatic EGFR-mutated NSCLC treated first-line with Amivantamab+ Lazertinib.

## 2024-11-20 ENCOUNTER — LAB (OUTPATIENT)
Dept: LAB | Facility: CLINIC | Age: 79
End: 2024-11-20
Payer: MEDICARE

## 2024-11-20 ENCOUNTER — OFFICE VISIT (OUTPATIENT)
Dept: HEMATOLOGY/ONCOLOGY | Facility: CLINIC | Age: 79
End: 2024-11-20
Payer: MEDICARE

## 2024-11-20 VITALS
RESPIRATION RATE: 18 BRPM | SYSTOLIC BLOOD PRESSURE: 144 MMHG | OXYGEN SATURATION: 96 % | DIASTOLIC BLOOD PRESSURE: 69 MMHG | HEART RATE: 64 BPM | BODY MASS INDEX: 30.85 KG/M2 | TEMPERATURE: 97.7 F | WEIGHT: 233 LBS

## 2024-11-20 DIAGNOSIS — C79.51 METASTATIC CARCINOMA INVOLVING BONE WITH UNKNOWN PRIMARY SITE (MULTI): ICD-10-CM

## 2024-11-20 DIAGNOSIS — C80.1 METASTATIC CARCINOMA INVOLVING BONE WITH UNKNOWN PRIMARY SITE (MULTI): Primary | ICD-10-CM

## 2024-11-20 DIAGNOSIS — C79.51 SECONDARY CANCER OF BONE (MULTI): ICD-10-CM

## 2024-11-20 DIAGNOSIS — C80.1 METASTATIC CARCINOMA INVOLVING BONE WITH UNKNOWN PRIMARY SITE (MULTI): ICD-10-CM

## 2024-11-20 DIAGNOSIS — M84.48XA PATHOLOGIC FRACTURE OF LUMBAR VERTEBRA, INITIAL ENCOUNTER: ICD-10-CM

## 2024-11-20 DIAGNOSIS — C79.51 METASTATIC CARCINOMA INVOLVING BONE WITH UNKNOWN PRIMARY SITE (MULTI): Primary | ICD-10-CM

## 2024-11-20 DIAGNOSIS — S32.010A CLOSED COMPRESSION FRACTURE OF BODY OF L1 VERTEBRA (MULTI): ICD-10-CM

## 2024-11-20 LAB
ALBUMIN SERPL BCP-MCNC: 4 G/DL (ref 3.4–5)
ALP SERPL-CCNC: 70 U/L (ref 33–136)
ALT SERPL W P-5'-P-CCNC: 19 U/L (ref 10–52)
ANION GAP SERPL CALC-SCNC: 13 MMOL/L (ref 10–20)
AST SERPL W P-5'-P-CCNC: 17 U/L (ref 9–39)
BASOPHILS # BLD AUTO: 0.03 X10*3/UL (ref 0–0.1)
BASOPHILS NFR BLD AUTO: 0.4 %
BILIRUB SERPL-MCNC: 0.6 MG/DL (ref 0–1.2)
BUN SERPL-MCNC: 16 MG/DL (ref 6–23)
CALCIUM SERPL-MCNC: 9.2 MG/DL (ref 8.6–10.6)
CHLORIDE SERPL-SCNC: 104 MMOL/L (ref 98–107)
CO2 SERPL-SCNC: 27 MMOL/L (ref 21–32)
CREAT SERPL-MCNC: 0.95 MG/DL (ref 0.5–1.3)
EGFRCR SERPLBLD CKD-EPI 2021: 80 ML/MIN/1.73M*2
EOSINOPHIL # BLD AUTO: 0.19 X10*3/UL (ref 0–0.4)
EOSINOPHIL NFR BLD AUTO: 2.4 %
ERYTHROCYTE [DISTWIDTH] IN BLOOD BY AUTOMATED COUNT: 13.6 % (ref 11.5–14.5)
GLUCOSE SERPL-MCNC: 108 MG/DL (ref 74–99)
HBV CORE AB SER QL: NONREACTIVE
HBV SURFACE AB SER-ACNC: <3.1 MIU/ML
HBV SURFACE AG SERPL QL IA: NONREACTIVE
HCT VFR BLD AUTO: 44.1 % (ref 41–52)
HGB BLD-MCNC: 14.4 G/DL (ref 13.5–17.5)
IMM GRANULOCYTES # BLD AUTO: 0 X10*3/UL (ref 0–0.5)
IMM GRANULOCYTES NFR BLD AUTO: 0 % (ref 0–0.9)
LYMPHOCYTES # BLD AUTO: 2.98 X10*3/UL (ref 0.8–3)
LYMPHOCYTES NFR BLD AUTO: 37.6 %
MAGNESIUM SERPL-MCNC: 2.08 MG/DL (ref 1.6–2.4)
MCH RBC QN AUTO: 30.2 PG (ref 26–34)
MCHC RBC AUTO-ENTMCNC: 32.7 G/DL (ref 32–36)
MCV RBC AUTO: 93 FL (ref 80–100)
MONOCYTES # BLD AUTO: 0.43 X10*3/UL (ref 0.05–0.8)
MONOCYTES NFR BLD AUTO: 5.4 %
NEUTROPHILS # BLD AUTO: 4.29 X10*3/UL (ref 1.6–5.5)
NEUTROPHILS NFR BLD AUTO: 54.2 %
NRBC BLD-RTO: ABNORMAL /100{WBCS}
PLATELET # BLD AUTO: 137 X10*3/UL (ref 150–450)
POTASSIUM SERPL-SCNC: 4 MMOL/L (ref 3.5–5.3)
PROT SERPL-MCNC: 6.2 G/DL (ref 6.4–8.2)
RBC # BLD AUTO: 4.77 X10*6/UL (ref 4.5–5.9)
SODIUM SERPL-SCNC: 140 MMOL/L (ref 136–145)
WBC # BLD AUTO: 7.9 X10*3/UL (ref 4.4–11.3)

## 2024-11-20 PROCEDURE — 1125F AMNT PAIN NOTED PAIN PRSNT: CPT | Performed by: STUDENT IN AN ORGANIZED HEALTH CARE EDUCATION/TRAINING PROGRAM

## 2024-11-20 PROCEDURE — 3077F SYST BP >= 140 MM HG: CPT | Performed by: STUDENT IN AN ORGANIZED HEALTH CARE EDUCATION/TRAINING PROGRAM

## 2024-11-20 PROCEDURE — 3078F DIAST BP <80 MM HG: CPT | Performed by: STUDENT IN AN ORGANIZED HEALTH CARE EDUCATION/TRAINING PROGRAM

## 2024-11-20 PROCEDURE — 1123F ACP DISCUSS/DSCN MKR DOCD: CPT | Performed by: STUDENT IN AN ORGANIZED HEALTH CARE EDUCATION/TRAINING PROGRAM

## 2024-11-20 PROCEDURE — 80053 COMPREHEN METABOLIC PANEL: CPT

## 2024-11-20 PROCEDURE — 99215 OFFICE O/P EST HI 40 MIN: CPT | Performed by: STUDENT IN AN ORGANIZED HEALTH CARE EDUCATION/TRAINING PROGRAM

## 2024-11-20 PROCEDURE — 87340 HEPATITIS B SURFACE AG IA: CPT

## 2024-11-20 PROCEDURE — 1036F TOBACCO NON-USER: CPT | Performed by: STUDENT IN AN ORGANIZED HEALTH CARE EDUCATION/TRAINING PROGRAM

## 2024-11-20 PROCEDURE — 85025 COMPLETE CBC W/AUTO DIFF WBC: CPT

## 2024-11-20 PROCEDURE — 36415 COLL VENOUS BLD VENIPUNCTURE: CPT

## 2024-11-20 PROCEDURE — 83735 ASSAY OF MAGNESIUM: CPT

## 2024-11-20 PROCEDURE — 86704 HEP B CORE ANTIBODY TOTAL: CPT

## 2024-11-20 PROCEDURE — 86706 HEP B SURFACE ANTIBODY: CPT

## 2024-11-20 PROCEDURE — 1159F MED LIST DOCD IN RCRD: CPT | Performed by: STUDENT IN AN ORGANIZED HEALTH CARE EDUCATION/TRAINING PROGRAM

## 2024-11-20 PROCEDURE — 99417 PROLNG OP E/M EACH 15 MIN: CPT | Performed by: STUDENT IN AN ORGANIZED HEALTH CARE EDUCATION/TRAINING PROGRAM

## 2024-11-20 RX ORDER — OXYCODONE HYDROCHLORIDE 5 MG/1
5 TABLET ORAL EVERY 4 HOURS PRN
Start: 2024-11-20 | End: 2024-12-18

## 2024-11-20 ASSESSMENT — PAIN SCALES - GENERAL: PAINLEVEL_OUTOF10: 5

## 2024-11-20 NOTE — PROGRESS NOTES
"  Patient ID: Willy Reed \"Harpal\" is a 79 y.o. 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     SITES OF DISEASE  3.1 x 2.0 cm R lung apical mass  8 mm RLL nodule  L1 vertebral body      MOLECULAR GENOMICS  TPS PD-L1<1%    Positive for AE1/AE3  Positive for EMA  Positive for TTF1 (8G7G3 clone)    Negative for GFAP  Negative for CDX2  Negative for PAX8   Negative for NKX3.1     PRIOR THERAPIES  None     CURRENT THERAPY  Osimertinib (80 mg) to commence upon completion of SBRT    CURRENT ONCOLOGICAL PROBLEMS  -L1 vertebral body mass     HISTORY OF PRESENT ILLNESS  79 year old male w/ PMH 3xCVA (1999, 2014, 2019), HTN, TOSHIA, atrial septal defect, HLD, OA, BPH, glaucoma, new dx of metastatic lung adenocarcinoma (L1 vertebral body, RUL, RLL) presenting to establish care.    Originally presented 9/30/2024 with 4 weeks of LBP and RLE pain. Underwent MRI lumbar spine 10/3/2024 with L1 vertebral body lesion concerning for malignancy. 10/16/24: PET/CT with increased uptake in L1. 10/31/24 L1 vertebral body biopsy with pathology concerning for pulmonary primary, TTF1 positive c/w lung adenocarcinoma.    11/8/24 CT chest with 8mm RLL nodule (previously 3mm 3/2024), 3.1 x 2.0 cm R lung apex mass    11/9/24 MRI brain negative    11/11/24 PET/CT with stable lung masses    Presenting 11/13/24 to establish care    PAST MEDICAL HISTORY  3xCVA (1999, 2014, 2019), HTN, TOSHIA, atrial septal defect, HLD, OA, BPH, glaucoma    SURGICAL HISTORY  ASD repair 1999  R knee, R shoulder, L shoulder arthroplasty     SOCIAL HISTORY  Lives with wife Judith, 2 children. 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 Denies recreational drug use.     FAMILY HISTORY  Mother - lymphoma, passed 57 from lymphoma  Brother - thyroid cancer " metastatic to lung, passed at 83 from cancer  Maternal uncle - cancer unspecified  Daughter - skin cancer    CURRENT MEDS REVIEWED       ALLERGIES REVIEWED        SUBJECTIVE: Patient doing well today. He is still having back pain but otherwise without complaint. Notes he has been taking oxy every 8 hours. Pain is improved mildly but still present. He sleeps 3 hours at night due to pain but is tentative to increase his pain medication.    A 13 point review of systems was performed, with significant findings documented above in subjective history.    OBJECTIVE:  Vitals:    11/20/24 1119   BP: 144/69   Pulse: 64   Resp: 18   Temp: 36.5 °C (97.7 °F)   SpO2: 96%      Body surface area is 2.33 meters squared.     Wt Readings from Last 5 Encounters:   11/20/24 106 kg (233 lb)   11/13/24 104 kg (230 lb 1.6 oz)   11/08/24 106 kg (233 lb)   11/04/24 106 kg (233 lb 11 oz)   09/30/24 107 kg (235 lb)       ECOGSCORE: 3- Capable of only limited selfcare, confined to bed/chair > 50% of waking hrs.  GEN: pleasant, well appearing, in NAD, sitting in wheel chair  EYES: PERRLA, EOMI  CV: RRR, normal S1/S2, no m/r/g  PULM: CTAB  ABD: soft, nontender, nondistended  NEURO: no FND  PSYCH: appropriate mood and affect  MSK: no joint swelling grossly noted  EXT: 1+ pitting LE edema up to the shins bilaterally  SKIN: warm and dry, no lesions grossly noted    Diagnostic Results   Results:  Labs:  Lab Results   Component Value Date    WBC 10.2 10/28/2024    HGB 15.2 10/28/2024    HCT 46.2 10/28/2024    MCV 92 10/28/2024     10/28/2024      Lab Results   Component Value Date    NEUTROABS 12.36 (H) 05/17/2021        Lab Results   Component Value Date    GLUCOSE 113 (H) 02/14/2023    CALCIUM 9.1 02/14/2023     02/14/2023    K 4.3 02/14/2023    CO2 29 02/14/2023     02/14/2023    BUN 15 02/14/2023    CREATININE 1.02 02/14/2023    MG 2.2 01/19/2021     Lab Results   Component Value Date    ALT 24 02/14/2023    AST 21 02/14/2023     ALKPHOS 66 02/14/2023    BILITOT 1.3 (H) 02/14/2023      Lab Results   Component Value Date    TSH 2.63 01/25/2022     MRI Brain 11/9/24    IMPRESSION:  1. No intracranial metastases.  2. Chronic intracranial findings as above including an old infarcts  in the right inferior cerebellum, left frontal lobe, and left  parietal lobe including within the postcentral gyrus.    PET 11/11/24    IMPRESSION:  1. Mildly hypermetabolic right apical lung mass, which is stable  compared to prior imaging, although a neoplastic process can not be  excluded.  2. Hypermetabolic L1 vertebral body, consistent with bone metastasis.  3. No hypermetabolic devonte metastasis.    MRI L spine 11/18/24  IMPRESSION:  Redemonstrated is a mass within the L3 vertebral body measuring 4.7 x  3.1 x 4.6 cm with extraosseous extension into the anterior epidural  space resulting in moderate to severe narrowing of the spinal canal.  There is a pathologic fracture of L1 with 75% loss of vertebral body  height. There is 1.1 cm of osseous retropulsion/extra osseous tumor  extending into the anterior epidural space.      Degenerative changes at the additional lumbar levels without  significant spinal canal stenosis. Mild-to-moderate neural foraminal  narrowing as detailed above.    Assessment/Plan   Mr. Harpal Reed is an 80 YO with a PMH of 3xCVA (1999, 2014, 2019), HTN, TOSHIA, atrial septal defect, HLD, OA, BPH, glaucoma seen for Stage JUMA ([id number]) adenocarcinoma of RUL.     We discussed the natural history of metastatic NSCLC both with and without treatment. Patient has significant pain related to his L1 lesion but no alarm symptoms. He will undergo SBRT to L spine next week.    Mutational analysis notable for EGFR L858R. He is a great candidate for osimertinib - this was shared with him. We reviewed side effects and he would like to proceed after completion of SBRT. I will [facility] for [facility] for [facility] assistance given he is 100%  service connected.    #Stage JUMA ([id number]) adenocarcinoma of RUL: EGFR L858R mutant   -Sites of disease include L1 vertebra, RUL, RLL nodule  -Primary issue is back pain caused by metastatic lesion severely limiting functional status. Scheduled for SBRT to L spine next week  -Discussed osimertinib and will proceed with osimertinib at 80 mg upon RT completion  -Increase to oxycodone 5mg q4h for back pain, counseled on taking as directed  -RTC in 2 weeks after RT and possible [facility] consultation    Maverick Boyd MD  Thoracic Medical Oncology   [address] [place], [place] [address]  Phone: [phone]

## 2024-11-21 ENCOUNTER — TELEPHONE (OUTPATIENT)
Dept: HEMATOLOGY/ONCOLOGY | Facility: HOSPITAL | Age: 79
End: 2024-11-21
Payer: MEDICARE

## 2024-11-21 ENCOUNTER — APPOINTMENT (OUTPATIENT)
Dept: RADIATION ONCOLOGY | Facility: HOSPITAL | Age: 79
End: 2024-11-21
Payer: MEDICARE

## 2024-11-21 ENCOUNTER — TELEPHONE (OUTPATIENT)
Dept: HEMATOLOGY/ONCOLOGY | Facility: CLINIC | Age: 79
End: 2024-11-21
Payer: MEDICARE

## 2024-11-21 NOTE — TELEPHONE ENCOUNTER
Patient contacted office with c/o pain over night.   He reports pain during the day is well controlled on Oxycodone every 4 hours. However he wakes up every night after 3 hours of sleep in 8/10 pain. Takes at least an hour to improve. He took a dose of acetaminophen last night which was helpful.  Taking 10mg oxycodone at bed did not help.    Tonight he will trial oxycodone 5mg + acetaminophen at HS and repeat combination when he wakes even if it has only been 3 hours.   I also suggest heat or ice.  He will update as necessary.

## 2024-11-29 ENCOUNTER — TELEPHONE (OUTPATIENT)
Dept: HEMATOLOGY/ONCOLOGY | Facility: HOSPITAL | Age: 79
End: 2024-11-29
Payer: MEDICARE

## 2024-11-29 ENCOUNTER — TELEPHONE (OUTPATIENT)
Dept: ADMISSION | Facility: HOSPITAL | Age: 79
End: 2024-11-29
Payer: MEDICARE

## 2024-11-29 NOTE — TELEPHONE ENCOUNTER
Pt is requesting update on rad onc appts. He had sim done on 11/19 and has not heard from team.   He is having a great deal of pain and is hoping to start tx asap.   Pt does have FUV with Dr. Boyd on 12/4 and [facility] with [facility] on 12/3   15-Mar-2023

## 2024-11-29 NOTE — TELEPHONE ENCOUNTER
11/29/2024 @ 1202:  Received call from patient, requesting requesting updates on radiation treatment appointments.  Reports continued low back, right hip and right leg pain, right leg paresthesias, minimal pain relief with oral pain meds and disrupted sleep r/t pain.  States CT sim was 11/19/2024; was told he would receive update on treatment start date within 5-7 business days and today is the 7th day.  He requests a call back to [phone], please.    Update sent to Dr. Huang,

## 2024-12-02 ENCOUNTER — TELEPHONE (OUTPATIENT)
Dept: ADMISSION | Facility: HOSPITAL | Age: 79
End: 2024-12-02
Payer: MEDICARE

## 2024-12-02 DIAGNOSIS — C80.1 METASTATIC CARCINOMA INVOLVING BONE WITH UNKNOWN PRIMARY SITE (MULTI): ICD-10-CM

## 2024-12-02 DIAGNOSIS — M84.48XA PATHOLOGIC FRACTURE OF LUMBAR VERTEBRA, INITIAL ENCOUNTER: ICD-10-CM

## 2024-12-02 DIAGNOSIS — C79.51 METASTATIC CARCINOMA INVOLVING BONE WITH UNKNOWN PRIMARY SITE (MULTI): ICD-10-CM

## 2024-12-02 DIAGNOSIS — S32.010A CLOSED COMPRESSION FRACTURE OF BODY OF L1 VERTEBRA (MULTI): ICD-10-CM

## 2024-12-02 RX ORDER — OXYCODONE HYDROCHLORIDE 5 MG/1
5 TABLET ORAL EVERY 4 HOURS PRN
Qty: 15 TABLET | Refills: 0 | Status: SHIPPED | OUTPATIENT
Start: 2024-12-02 | End: 2024-12-04 | Stop reason: SDUPTHER

## 2024-12-02 NOTE — TELEPHONE ENCOUNTER
Medication Refill-  Oxycodone 5mg    [facility]-  [facility] #[id number]

## 2024-12-03 ENCOUNTER — TELEPHONE (OUTPATIENT)
Dept: HEMATOLOGY/ONCOLOGY | Facility: HOSPITAL | Age: 79
End: 2024-12-03
Payer: MEDICARE

## 2024-12-03 NOTE — TELEPHONE ENCOUNTER
Pharmacist states oxy last filled 11/14 qt 84, ok to dispense today? Also notes this rx for quantity of 15 for up to 28 days, is intention for 15 to last 28 days or ok to change duration?

## 2024-12-04 ENCOUNTER — HOSPITAL ENCOUNTER (OUTPATIENT)
Dept: RADIATION ONCOLOGY | Facility: HOSPITAL | Age: 79
Setting detail: RADIATION/ONCOLOGY SERIES
Discharge: HOME | End: 2024-12-04
Payer: MEDICARE

## 2024-12-04 ENCOUNTER — OFFICE VISIT (OUTPATIENT)
Dept: HEMATOLOGY/ONCOLOGY | Facility: CLINIC | Age: 79
End: 2024-12-04
Payer: MEDICARE

## 2024-12-04 VITALS
DIASTOLIC BLOOD PRESSURE: 71 MMHG | WEIGHT: 246.39 LBS | RESPIRATION RATE: 18 BRPM | OXYGEN SATURATION: 96 % | BODY MASS INDEX: 32.62 KG/M2 | SYSTOLIC BLOOD PRESSURE: 149 MMHG | TEMPERATURE: 98.8 F | HEART RATE: 64 BPM

## 2024-12-04 DIAGNOSIS — C79.51 METASTATIC CARCINOMA INVOLVING BONE WITH UNKNOWN PRIMARY SITE (MULTI): ICD-10-CM

## 2024-12-04 DIAGNOSIS — C79.51 SECONDARY CANCER OF BONE (MULTI): ICD-10-CM

## 2024-12-04 DIAGNOSIS — S32.010A CLOSED COMPRESSION FRACTURE OF BODY OF L1 VERTEBRA (MULTI): ICD-10-CM

## 2024-12-04 DIAGNOSIS — C80.1 METASTATIC CARCINOMA INVOLVING BONE WITH UNKNOWN PRIMARY SITE (MULTI): ICD-10-CM

## 2024-12-04 DIAGNOSIS — M84.48XA PATHOLOGIC FRACTURE OF LUMBAR VERTEBRA, INITIAL ENCOUNTER: ICD-10-CM

## 2024-12-04 PROCEDURE — 3078F DIAST BP <80 MM HG: CPT | Performed by: STUDENT IN AN ORGANIZED HEALTH CARE EDUCATION/TRAINING PROGRAM

## 2024-12-04 PROCEDURE — 99215 OFFICE O/P EST HI 40 MIN: CPT | Performed by: STUDENT IN AN ORGANIZED HEALTH CARE EDUCATION/TRAINING PROGRAM

## 2024-12-04 PROCEDURE — 1123F ACP DISCUSS/DSCN MKR DOCD: CPT | Performed by: STUDENT IN AN ORGANIZED HEALTH CARE EDUCATION/TRAINING PROGRAM

## 2024-12-04 PROCEDURE — G2211 COMPLEX E/M VISIT ADD ON: HCPCS | Performed by: STUDENT IN AN ORGANIZED HEALTH CARE EDUCATION/TRAINING PROGRAM

## 2024-12-04 PROCEDURE — 3077F SYST BP >= 140 MM HG: CPT | Performed by: STUDENT IN AN ORGANIZED HEALTH CARE EDUCATION/TRAINING PROGRAM

## 2024-12-04 PROCEDURE — 1125F AMNT PAIN NOTED PAIN PRSNT: CPT | Performed by: STUDENT IN AN ORGANIZED HEALTH CARE EDUCATION/TRAINING PROGRAM

## 2024-12-04 PROCEDURE — 1159F MED LIST DOCD IN RCRD: CPT | Performed by: STUDENT IN AN ORGANIZED HEALTH CARE EDUCATION/TRAINING PROGRAM

## 2024-12-04 RX ORDER — OXYCODONE HYDROCHLORIDE 5 MG/1
5 TABLET ORAL EVERY 4 HOURS
Qty: 120 TABLET | Refills: 0 | Status: SHIPPED | OUTPATIENT
Start: 2024-12-04 | End: 2024-12-24

## 2024-12-04 ASSESSMENT — PAIN SCALES - GENERAL: PAINLEVEL_OUTOF10: 6

## 2024-12-04 NOTE — PROGRESS NOTES
"  Patient ID: Willy Reed \"Harpal\" is a 79 y.o. 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     SITES OF DISEASE  3.1 x 2.0 cm R lung apical mass  8 mm RLL nodule  L1 vertebral body      MOLECULAR GENOMICS  TPS PD-L1<1%    Positive for AE1/AE3  Positive for EMA  Positive for TTF1 (8G7G3 clone)    Negative for GFAP  Negative for CDX2  Negative for PAX8   Negative for NKX3.1     PRIOR THERAPIES  None     CURRENT THERAPY  Osimertinib (80 mg) to commence upon completion of SBRT    CURRENT ONCOLOGICAL PROBLEMS  -L1 vertebral body mass     HISTORY OF PRESENT ILLNESS  79 year old male w/ PMH 3xCVA (1999, 2014, 2019), HTN, TOSHIA, atrial septal defect, HLD, OA, BPH, glaucoma, new dx of metastatic lung adenocarcinoma (L1 vertebral body, RUL, RLL) presenting to establish care.    Originally presented 9/30/2024 with 4 weeks of LBP and RLE pain. Underwent MRI lumbar spine 10/3/2024 with L1 vertebral body lesion concerning for malignancy. 10/16/24: PET/CT with increased uptake in L1. 10/31/24 L1 vertebral body biopsy with pathology concerning for pulmonary primary, TTF1 positive c/w lung adenocarcinoma.    11/8/24 CT chest with 8mm RLL nodule (previously 3mm 3/2024), 3.1 x 2.0 cm R lung apex mass    11/9/24 MRI brain negative    11/11/24 PET/CT with stable lung masses    Presenting 11/13/24 to establish care    PAST MEDICAL HISTORY  3xCVA (1999, 2014, 2019), HTN, TOSHIA, atrial septal defect, HLD, OA, BPH, glaucoma    SURGICAL HISTORY  ASD repair 1999  R knee, R shoulder, L shoulder arthroplasty     SOCIAL HISTORY  Lives with wife Judith, 2 children. 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 Denies recreational drug use.     FAMILY HISTORY  Mother - lymphoma, passed 57 from lymphoma  Brother - thyroid cancer " metastatic to lung, passed at 83 from cancer  Maternal uncle - cancer unspecified  Daughter - skin cancer    CURRENT MEDS REVIEWED       ALLERGIES REVIEWED        SUBJECTIVE: Patient doing ok today.  He is planned for palliative RT to L1 tomorrow through next Monday.  His pain is controlled when he takes oxycodone scheduled every 4 hours with breakthrough acetaminophen. Otherwise without complaint - will receive osimertinib in next week.    A 13 point review of systems was performed, with significant findings documented above in subjective history.    OBJECTIVE:  Vitals:    12/04/24 1441   BP: 149/71   Pulse: 64   Resp: 18   Temp: 37.1 °C (98.8 °F)   SpO2: 96%      Body surface area is 2.4 meters squared.     Wt Readings from Last 5 Encounters:   12/04/24 112 kg (246 lb 6.2 oz)   11/20/24 106 kg (233 lb)   11/13/24 104 kg (230 lb 1.6 oz)   11/08/24 106 kg (233 lb)   11/04/24 106 kg (233 lb 11 oz)       ECOGSCORE: 3- Capable of only limited selfcare, confined to bed/chair > 50% of waking hrs.  GEN: pleasant, well appearing, in NAD, sitting in wheel chair  EYES: PERRLA, EOMI  CV: RRR, normal S1/S2, no m/r/g  PULM: CTAB  ABD: soft, nontender, nondistended  NEURO: no FND  PSYCH: appropriate mood and affect  MSK: no joint swelling grossly noted  EXT: 1+ pitting LE edema up to the shins bilaterally  SKIN: warm and dry, no lesions grossly noted    Diagnostic Results   Results:  Labs:  Lab Results   Component Value Date    WBC 7.9 11/20/2024    HGB 14.4 11/20/2024    HCT 44.1 11/20/2024    MCV 93 11/20/2024     (L) 11/20/2024      Lab Results   Component Value Date    NEUTROABS 4.29 11/20/2024        Lab Results   Component Value Date    GLUCOSE 108 (H) 11/20/2024    CALCIUM 9.2 11/20/2024     11/20/2024    K 4.0 11/20/2024    CO2 27 11/20/2024     11/20/2024    BUN 16 11/20/2024    CREATININE 0.95 11/20/2024    MG 2.08 11/20/2024     Lab Results   Component Value Date    ALT 19 11/20/2024    AST 17  11/20/2024    ALKPHOS 70 11/20/2024    BILITOT 0.6 11/20/2024      Lab Results   Component Value Date    TSH 2.63 01/25/2022     MRI Brain 11/9/24    IMPRESSION:  1. No intracranial metastases.  2. Chronic intracranial findings as above including an old infarcts  in the right inferior cerebellum, left frontal lobe, and left  parietal lobe including within the postcentral gyrus.    PET 11/11/24    IMPRESSION:  1. Mildly hypermetabolic right apical lung mass, which is stable  compared to prior imaging, although a neoplastic process can not be  excluded.  2. Hypermetabolic L1 vertebral body, consistent with bone metastasis.  3. No hypermetabolic devonte metastasis.    MRI L spine 11/18/24  IMPRESSION:  Redemonstrated is a mass within the L3 vertebral body measuring 4.7 x  3.1 x 4.6 cm with extraosseous extension into the anterior epidural  space resulting in moderate to severe narrowing of the spinal canal.  There is a pathologic fracture of L1 with 75% loss of vertebral body  height. There is 1.1 cm of osseous retropulsion/extra osseous tumor  extending into the anterior epidural space.      Degenerative changes at the additional lumbar levels without  significant spinal canal stenosis. Mild-to-moderate neural foraminal  narrowing as detailed above.    Assessment/Plan   Mr. Harpal Reed is an 80 YO with a PMH of 3xCVA (1999, 2014, 2019), HTN, TOSHIA, atrial septal defect, HLD, OA, BPH, glaucoma seen for Stage JUMA ([id number]) adenocarcinoma of RUL.     We discussed the natural history of metastatic NSCLC both with and without treatment. Patient has significant pain related to his L1 lesion but no alarm symptoms. He will undergo SBRT to L spine next week.    Mutational analysis notable for EGFR L858R. He is a great candidate for osimertinib - this was shared with him. We reviewed side effects and he would like to proceed after completion of SBRT. [facility] and [facility] will be mailed in next week - to begin once it  arrives.    #Stage JUMA ([id number]) adenocarcinoma of RUL: EGFR L858R mutant   -Sites of disease include L1 vertebra, RUL, RLL nodule  -Primary issue is back pain caused by metastatic lesion severely limiting functional status. Scheduled for SBRT to L spine next week  -Discussed osimertinib and will proceed with osimertinib at 80 mg upon RT completion  -Increase to oxycodone 5mg q4h for back pain scheduled and refilled  -Follow in 1 month with Shaista FLOWER-CNP and here in 2 months with scans    Maverick Boyd MD  Thoracic Medical Oncology   [address] [place] [id number]  Phone: [phone]

## 2024-12-05 ENCOUNTER — APPOINTMENT (OUTPATIENT)
Dept: RADIATION ONCOLOGY | Facility: HOSPITAL | Age: 79
End: 2024-12-05
Payer: MEDICARE

## 2024-12-06 ENCOUNTER — HOSPITAL ENCOUNTER (OUTPATIENT)
Dept: RADIATION ONCOLOGY | Facility: HOSPITAL | Age: 79
Setting detail: RADIATION/ONCOLOGY SERIES
Discharge: HOME | End: 2024-12-06
Payer: MEDICARE

## 2024-12-06 ENCOUNTER — RADIATION ONCOLOGY OTV (OUTPATIENT)
Dept: RADIATION ONCOLOGY | Facility: HOSPITAL | Age: 79
End: 2024-12-06
Payer: MEDICARE

## 2024-12-06 VITALS
RESPIRATION RATE: 18 BRPM | TEMPERATURE: 97.2 F | OXYGEN SATURATION: 95 % | DIASTOLIC BLOOD PRESSURE: 71 MMHG | SYSTOLIC BLOOD PRESSURE: 144 MMHG | HEART RATE: 61 BPM

## 2024-12-06 DIAGNOSIS — C79.51 SECONDARY MALIGNANT NEOPLASM OF BONE (MULTI): ICD-10-CM

## 2024-12-06 DIAGNOSIS — C79.51 SECONDARY MALIGNANT NEOPLASM OF BONE (MULTI): Primary | ICD-10-CM

## 2024-12-06 DIAGNOSIS — C34.11 MALIGNANT NEOPLASM OF UPPER LOBE, RIGHT BRONCHUS OR LUNG: ICD-10-CM

## 2024-12-06 DIAGNOSIS — Z51.0 ENCOUNTER FOR ANTINEOPLASTIC RADIATION THERAPY: ICD-10-CM

## 2024-12-06 LAB
RAD ONC MSQ ACTUAL FRACTIONS DELIVERED: 1
RAD ONC MSQ ACTUAL SESSION DELIVERED DOSE: 900 CGRAY
RAD ONC MSQ ACTUAL TOTAL DOSE: 900 CGRAY
RAD ONC MSQ ELAPSED DAYS: 0
RAD ONC MSQ LAST DATE: NORMAL
RAD ONC MSQ PRESCRIBED FRACTIONAL DOSE: 900 CGRAY
RAD ONC MSQ PRESCRIBED NUMBER OF FRACTIONS: 3
RAD ONC MSQ PRESCRIBED TECHNIQUE: NORMAL
RAD ONC MSQ PRESCRIBED TOTAL DOSE: 2700 CGRAY
RAD ONC MSQ PRESCRIPTION PATTERN COMMENT: NORMAL
RAD ONC MSQ START DATE: NORMAL
RAD ONC MSQ TREATMENT COURSE NUMBER: 1
RAD ONC MSQ TREATMENT SITE: NORMAL

## 2024-12-06 PROCEDURE — 77373 STRTCTC BDY RAD THER TX DLVR: CPT | Performed by: RADIOLOGY

## 2024-12-06 PROCEDURE — 77280 THER RAD SIMULAJ FIELD SMPL: CPT | Performed by: RADIOLOGY

## 2024-12-06 RX ORDER — METHYLPREDNISOLONE 4 MG/1
TABLET ORAL
Qty: 21 TABLET | Refills: 0 | Status: SHIPPED | OUTPATIENT
Start: 2024-12-06

## 2024-12-06 RX ORDER — LORAZEPAM 1 MG/1
TABLET ORAL
Qty: 2 TABLET | Refills: 0 | Status: SHIPPED | OUTPATIENT
Start: 2024-12-06

## 2024-12-06 RX ORDER — OMEPRAZOLE 20 MG/1
20 TABLET, DELAYED RELEASE ORAL
Qty: 14 TABLET | Refills: 0 | Status: SHIPPED | OUTPATIENT
Start: 2024-12-06 | End: 2024-12-20

## 2024-12-06 ASSESSMENT — PAIN SCALES - GENERAL: PAINLEVEL_OUTOF10: 2

## 2024-12-06 NOTE — PROGRESS NOTES
Radiation Oncology On Treatment Visit    Patient Name:  Willy Reed  MRN:  [id number]  :  1945    Referring Provider: No ref. provider found  Primary Care Provider: MYNOR Longoria  Care Team: Patient Care Team:  MYNOR Longoria as PCP - General (Family Medicine)  Keily Phelps MD as Primary Care Provider  Maverick Boyd MD as Consulting Physician (Hematology and Oncology)  Misael Simons RN as Nurse Navigator (Hematology and Oncology)    Date of Service: 2024     Diagnosis:   Specialty Problems          Radiation Oncology Problems    Secondary cancer of bone (Multi)         Treatment Summary:  Radiation Therapy    Treatment Period Technique Fraction Dose Fractions Total Dose   Course 1 2024-2024  (days elapsed: 0)         L_Spine 2024-2024 SBRT 900 / 900 cGy 1 / 3 900 / 2,700 cGy     SUBJECTIVE: Pt is in good spirits and accompanied by son pt denies any issues other than what is denoted below.  He continues to use Oxycodone for pain, which brings his pain level down to 2/10. He continues to have limited gait.    OBJECTIVE:   Vital Signs:  /71   Pulse 61   Temp 36.2 °C (97.2 °F)   Resp 18   SpO2 95%     Other Pertinent Findings:   NAD, ACO x3  Motor power 5/5 overall  Sensation to light touch and pinprick intact.    Toxicity Assessment          2024    16:46   Toxicity Assessment   Adverse Events Reviewed (WDL) Yes (Within Defined Limits)   Treatment Site Bone   Anorexia Grade 0   Anxiety Grade 0   Dehydration Grade 0   Depression Grade 0   Dermatitis Radiation Grade 0   Diarrhea Grade 0   Fatigue Grade 0   Nausea Grade 0   Pain Grade 0       2/10 with medication   Vomiting Grade 0   Joint Range of Motion Decreased Grade 0   Bone Pain Grade 1       2/10   Edema Limbs Grade 0   Pruritus Grade 0   Rash Acneiform Grade 0   Skin Hyperpigmentation Grade 0   Skin Hypopigmentation Grade 0   Skin Induration Grade 0   Skin Ulceration Grade 0         Assessment / Plan:  The patient is tolerating radiation therapy as anticipated.  Continue per current treatment plan.     Medrol dose pack to prevent pain flare.  Keep on Oxycodone for pain.  PPI for 14 days.  Repeat MRI in 3 months.  RTC 1 week after MRI.  Patient to start Osimertinib after SBRT.    Greta Huang MD PhD

## 2024-12-09 ENCOUNTER — TELEPHONE (OUTPATIENT)
Dept: HEMATOLOGY/ONCOLOGY | Facility: HOSPITAL | Age: 79
End: 2024-12-09
Payer: MEDICARE

## 2024-12-09 ENCOUNTER — HOSPITAL ENCOUNTER (OUTPATIENT)
Dept: RADIATION ONCOLOGY | Facility: HOSPITAL | Age: 79
Setting detail: RADIATION/ONCOLOGY SERIES
Discharge: HOME | End: 2024-12-09
Payer: MEDICARE

## 2024-12-09 DIAGNOSIS — C79.51 SECONDARY MALIGNANT NEOPLASM OF BONE (MULTI): ICD-10-CM

## 2024-12-09 DIAGNOSIS — C34.11 MALIGNANT NEOPLASM OF UPPER LOBE, RIGHT BRONCHUS OR LUNG: ICD-10-CM

## 2024-12-09 DIAGNOSIS — Z51.0 ENCOUNTER FOR ANTINEOPLASTIC RADIATION THERAPY: ICD-10-CM

## 2024-12-09 LAB
RAD ONC MSQ ACTUAL FRACTIONS DELIVERED: 2
RAD ONC MSQ ACTUAL SESSION DELIVERED DOSE: 900 CGRAY
RAD ONC MSQ ACTUAL TOTAL DOSE: 1800 CGRAY
RAD ONC MSQ ELAPSED DAYS: 3
RAD ONC MSQ LAST DATE: NORMAL
RAD ONC MSQ PRESCRIBED FRACTIONAL DOSE: 900 CGRAY
RAD ONC MSQ PRESCRIBED NUMBER OF FRACTIONS: 3
RAD ONC MSQ PRESCRIBED TECHNIQUE: NORMAL
RAD ONC MSQ PRESCRIBED TOTAL DOSE: 2700 CGRAY
RAD ONC MSQ PRESCRIPTION PATTERN COMMENT: NORMAL
RAD ONC MSQ START DATE: NORMAL
RAD ONC MSQ TREATMENT COURSE NUMBER: 1
RAD ONC MSQ TREATMENT SITE: NORMAL

## 2024-12-09 PROCEDURE — 77373 STRTCTC BDY RAD THER TX DLVR: CPT | Performed by: RADIOLOGY

## 2024-12-09 NOTE — TELEPHONE ENCOUNTER
Returned patient's call. He received his Osimertinib yesterday and wanted to verify when he should start taking it.     His last RT is tomorrow.     Per Dr. Boyd, he can begin taking it on Wednesday, 12/11.     Reviewed with patient that medication can be taken with or without food. Also reviewed that it needs to be taken around the same time every day.     He provided teach back and understanding of the information provided.     Dr. Boyd is also aware.

## 2024-12-10 ENCOUNTER — HOSPITAL ENCOUNTER (OUTPATIENT)
Dept: RADIATION ONCOLOGY | Facility: HOSPITAL | Age: 79
Setting detail: RADIATION/ONCOLOGY SERIES
Discharge: HOME | End: 2024-12-10
Payer: MEDICARE

## 2024-12-10 ENCOUNTER — DOCUMENTATION (OUTPATIENT)
Dept: RADIATION ONCOLOGY | Facility: HOSPITAL | Age: 79
End: 2024-12-10
Payer: MEDICARE

## 2024-12-10 DIAGNOSIS — C79.51 SECONDARY MALIGNANT NEOPLASM OF BONE (MULTI): ICD-10-CM

## 2024-12-10 DIAGNOSIS — Z51.0 ENCOUNTER FOR ANTINEOPLASTIC RADIATION THERAPY: ICD-10-CM

## 2024-12-10 DIAGNOSIS — C34.11 MALIGNANT NEOPLASM OF UPPER LOBE, RIGHT BRONCHUS OR LUNG: ICD-10-CM

## 2024-12-10 LAB
RAD ONC MSQ ACTUAL FRACTIONS DELIVERED: 3
RAD ONC MSQ ACTUAL SESSION DELIVERED DOSE: 900 CGRAY
RAD ONC MSQ ACTUAL TOTAL DOSE: 2700 CGRAY
RAD ONC MSQ ELAPSED DAYS: 4
RAD ONC MSQ LAST DATE: NORMAL
RAD ONC MSQ PRESCRIBED FRACTIONAL DOSE: 900 CGRAY
RAD ONC MSQ PRESCRIBED NUMBER OF FRACTIONS: 3
RAD ONC MSQ PRESCRIBED TECHNIQUE: NORMAL
RAD ONC MSQ PRESCRIBED TOTAL DOSE: 2700 CGRAY
RAD ONC MSQ PRESCRIPTION PATTERN COMMENT: NORMAL
RAD ONC MSQ START DATE: NORMAL
RAD ONC MSQ TREATMENT COURSE NUMBER: 1
RAD ONC MSQ TREATMENT SITE: NORMAL

## 2024-12-10 PROCEDURE — 77336 RADIATION PHYSICS CONSULT: CPT | Performed by: INTERNAL MEDICINE

## 2024-12-10 PROCEDURE — 77373 STRTCTC BDY RAD THER TX DLVR: CPT | Performed by: RADIOLOGY

## 2024-12-16 ENCOUNTER — NURSE TRIAGE (OUTPATIENT)
Dept: ADMISSION | Facility: HOSPITAL | Age: 79
End: 2024-12-16
Payer: MEDICARE

## 2024-12-16 ENCOUNTER — HOSPITAL ENCOUNTER (EMERGENCY)
Facility: HOSPITAL | Age: 79
Discharge: HOME | End: 2024-12-16
Payer: MEDICARE

## 2024-12-16 VITALS
WEIGHT: 242.95 LBS | HEIGHT: 74 IN | DIASTOLIC BLOOD PRESSURE: 84 MMHG | BODY MASS INDEX: 31.18 KG/M2 | SYSTOLIC BLOOD PRESSURE: 139 MMHG | TEMPERATURE: 96.6 F | RESPIRATION RATE: 18 BRPM | HEART RATE: 74 BPM | OXYGEN SATURATION: 97 %

## 2024-12-16 DIAGNOSIS — I87.2 VENOUS STASIS ULCER OF RIGHT ANKLE LIMITED TO BREAKDOWN OF SKIN WITHOUT VARICOSE VEINS: ICD-10-CM

## 2024-12-16 DIAGNOSIS — L97.311 VENOUS STASIS ULCER OF RIGHT ANKLE LIMITED TO BREAKDOWN OF SKIN WITHOUT VARICOSE VEINS: ICD-10-CM

## 2024-12-16 DIAGNOSIS — I87.2 VENOUS STASIS ULCER OF LEFT ANKLE LIMITED TO BREAKDOWN OF SKIN WITHOUT VARICOSE VEINS: Primary | ICD-10-CM

## 2024-12-16 DIAGNOSIS — L97.321 VENOUS STASIS ULCER OF LEFT ANKLE LIMITED TO BREAKDOWN OF SKIN WITHOUT VARICOSE VEINS: Primary | ICD-10-CM

## 2024-12-16 PROCEDURE — 99283 EMERGENCY DEPT VISIT LOW MDM: CPT

## 2024-12-16 PROCEDURE — 99281 EMR DPT VST MAYX REQ PHY/QHP: CPT

## 2024-12-16 RX ORDER — FUROSEMIDE 20 MG/1
10 TABLET ORAL
Qty: 10 TABLET | Refills: 0 | Status: SHIPPED | OUTPATIENT
Start: 2024-12-16 | End: 2024-12-26

## 2024-12-16 RX ORDER — CEFUROXIME AXETIL 500 MG/1
500 TABLET ORAL 2 TIMES DAILY
Qty: 20 TABLET | Refills: 0 | Status: SHIPPED | OUTPATIENT
Start: 2024-12-16 | End: 2024-12-26

## 2024-12-16 ASSESSMENT — PAIN SCALES - GENERAL: PAINLEVEL_OUTOF10: 0 - NO PAIN

## 2024-12-16 ASSESSMENT — PAIN - FUNCTIONAL ASSESSMENT: PAIN_FUNCTIONAL_ASSESSMENT: 0-10

## 2024-12-16 ASSESSMENT — COLUMBIA-SUICIDE SEVERITY RATING SCALE - C-SSRS
6. HAVE YOU EVER DONE ANYTHING, STARTED TO DO ANYTHING, OR PREPARED TO DO ANYTHING TO END YOUR LIFE?: NO
2. HAVE YOU ACTUALLY HAD ANY THOUGHTS OF KILLING YOURSELF?: NO
1. IN THE PAST MONTH, HAVE YOU WISHED YOU WERE DEAD OR WISHED YOU COULD GO TO SLEEP AND NOT WAKE UP?: NO

## 2024-12-16 NOTE — ED PROVIDER NOTES
HPI   Chief Complaint   Patient presents with    Blister     I had my last radiation treatment on Tuesday but on Thursday I noticed blisters on my legs some have broken on rt leg they have been itchy        HPI  Patient is an 79-year-old male with history of lung cancer who presents to ED for lower extremity edema with skin breakdown.  Patient states he noticed blisters couple of days ago.  States his legs weep causing blisters.  He has small quarter sized wounds on his ankles bilaterally.  Denies any fever or chills.  Denies any history of diabetes.  No other acute complaints.      Patient History   Past Medical History:   Diagnosis Date    Cerebral infarction, unspecified 01/25/2022    CVA (cerebral vascular accident)    Essential (primary) hypertension 09/22/2022    HTN (hypertension)    Personal history of other diseases of the circulatory system 12/17/2019    History of atrial septal defect    Personal history of other diseases of the nervous system and sense organs     History of sleep apnea    Sleep apnea      Past Surgical History:   Procedure Laterality Date    CORONARY ARTERY BYPASS GRAFT  09/01/2016    CABG    MR HEAD ANGIO WO IV CONTRAST  6/28/2016    MR HEAD ANGIO WO IV CONTRAST 6/28/2016 [facility]    MR HEAD ANGIO WO IV CONTRAST  1/19/2021    MR HEAD ANGIO WO IV CONTRAST [facility]    MR NECK ANGIO WO IV CONTRAST  6/28/2016    MR NECK ANGIO WO IV CONTRAST 6/28/2016 [facility]    MR NECK ANGIO WO IV CONTRAST  1/20/2021    MR NECK ANGIO WO IV CONTRAST [facility]    OTHER SURGICAL HISTORY  06/25/2013    Injection Of Neurolytic Substance Epidural Thoracic    OTHER SURGICAL HISTORY  07/25/2022    Heart surgery    OTHER SURGICAL HISTORY  07/25/2022    Knee surgery    OTHER SURGICAL HISTORY  09/01/2016    Shoulder Surgery Left    SHOULDER SURGERY  09/01/2016    Shoulder Surgery Right     No family history on file.  Social History     Tobacco Use    Smoking status: Former      Current packs/day: 0.00     Average packs/day: 0.8 packs/day for 10.7 years (8.0 ttl pk-yrs)     Types: Cigarettes     Start date: 1962     Quit date: 1972     Years since quittin.3     Passive exposure: Never    Smokeless tobacco: Never   Vaping Use    Vaping status: Never Used   Substance Use Topics    Alcohol use: Yes     Alcohol/week: 1.0 standard drink of alcohol     Types: 1 Cans of beer per week     Comment: rare    Drug use: Never       Physical Exam   ED Triage Vitals [24 1357]   Temperature Heart Rate Respirations BP   35.9 °C (96.6 °F) 74 18 139/84      Pulse Ox Temp Source Heart Rate Source Patient Position   97 % Temporal Monitor Sitting      BP Location FiO2 (%)     Left arm --       Physical Exam  Vitals reviewed.   Constitutional:       General: He is not in acute distress.     Appearance: Normal appearance. He is not ill-appearing.   HENT:      Head: Normocephalic and atraumatic.   Eyes:      Extraocular Movements: Extraocular movements intact.   Cardiovascular:      Rate and Rhythm: Normal rate and regular rhythm.      Heart sounds: Normal heart sounds.   Pulmonary:      Effort: Pulmonary effort is normal.      Breath sounds: Normal breath sounds.   Abdominal:      General: Abdomen is flat.   Musculoskeletal:      Cervical back: Normal range of motion and neck supple.      Right lower leg: Edema present.      Left lower leg: Edema present.   Skin:     General: Skin is warm and dry.   Neurological:      Mental Status: He is alert and oriented to person, place, and time.   Psychiatric:         Mood and Affect: Mood normal.         Behavior: Behavior normal.           ED Course & MDM   Diagnoses as of 24 1805   Venous stasis ulcer of left ankle limited to breakdown of skin without varicose veins   Venous stasis ulcer of right ankle limited to breakdown of skin without varicose veins                 No data recorded     Hans Coma Scale Score: 15 (24 1443 : Connie WATKINS  DIANE Alfaro)                           Medical Decision Making  Parts of this chart have been completed using voice recognition software. Please excuse any errors of transcription.  My thought process and reason for plan has been formulated from the time that I saw the patient until the time of disposition and is not specific to one specific moment during their visit and furthermore my MDM encompasses this entire chart and not only this text box.    HPI:   A medically appropriate HPI was obtained, outlined above.    Willy Reed is a  79 y.o. male    Chief Complaint   Patient presents with    Blister     I had my last radiation treatment on Tuesday but on Thursday I noticed blisters on my legs some have broken on rt leg they have been itchy        Past Medical History:   Diagnosis Date    Cerebral infarction, unspecified 01/25/2022    CVA (cerebral vascular accident)    Essential (primary) hypertension 09/22/2022    HTN (hypertension)    Personal history of other diseases of the circulatory system 12/17/2019    History of atrial septal defect    Personal history of other diseases of the nervous system and sense organs     History of sleep apnea    Sleep apnea        Past Surgical History:   Procedure Laterality Date    CORONARY ARTERY BYPASS GRAFT  09/01/2016    CABG    MR HEAD ANGIO WO IV CONTRAST  6/28/2016    MR HEAD ANGIO WO IV CONTRAST 6/28/2016 [facility]    MR HEAD ANGIO WO IV CONTRAST  1/19/2021    MR HEAD ANGIO WO IV CONTRAST [facility]    MR NECK ANGIO WO IV CONTRAST  6/28/2016    MR NECK ANGIO WO IV CONTRAST 6/28/2016 [facility]    MR NECK ANGIO WO IV CONTRAST  1/20/2021    MR NECK ANGIO WO IV CONTRAST [facility]    OTHER SURGICAL HISTORY  06/25/2013    Injection Of Neurolytic Substance Epidural Thoracic    OTHER SURGICAL HISTORY  07/25/2022    Heart surgery    OTHER SURGICAL HISTORY  07/25/2022    Knee surgery    OTHER SURGICAL HISTORY  09/01/2016    Shoulder Surgery Left     SHOULDER SURGERY  2016    Shoulder Surgery Right       Social History     Tobacco Use    Smoking status: Former     Current packs/day: 0.00     Average packs/day: 0.8 packs/day for 10.7 years (8.0 ttl pk-yrs)     Types: Cigarettes     Start date: 1962     Quit date: 1972     Years since quittin.3     Passive exposure: Never    Smokeless tobacco: Never   Vaping Use    Vaping status: Never Used   Substance Use Topics    Alcohol use: Yes     Alcohol/week: 1.0 standard drink of alcohol     Types: 1 Cans of beer per week     Comment: rare    Drug use: Never       No family history on file.    Allergies   Allergen Reactions    Morphine Nausea And Vomiting       Current Outpatient Medications   Medication Instructions    acetaminophen (TYLENOL 8 HOUR) 1,300 mg, 3 times daily PRN    apixaban (Eliquis) 5 mg tablet 1 tablet, Every 12 hours    aspirin 81 mg chewable tablet 1 tablet, Daily    atorvastatin (LIPITOR) 40 mg, oral, Nightly    brimonidine (AlphaGAN P) 0.1 % ophthalmic solution 2 times daily    cefuroxime (CEFTIN) 500 mg, oral, 2 times daily    furosemide (LASIX) 10 mg, oral, 2 times daily (morning and late afternoon)    gabapentin (NEURONTIN) 600 mg, oral, 3 times daily    LORazepam (Ativan) 1 mg tablet Take 1 tab, 1 hour prior to your MRI.    losartan-hydrochlorothiazide (Hyzaar) 100-25 mg tablet 1 tablet, Daily    methylPREDNISolone (Medrol Dospak) 4 mg tablets Follow schedule on package instructions    multivit with minerals/lutein (MULTIVITAMIN 50 PLUS ORAL) Daily RT    netarsudiL-latanoprost (Rocklatan) 0.02-0.005 % drops Daily RT    omeprazole OTC (PRILOSEC OTC) 20 mg, oral, Daily before breakfast, Do not crush, chew, or split.    oxyCODONE (ROXICODONE) 5 mg, oral, Every 4 hours    tamsulosin (Flomax) 0.4 mg 24 hr capsule 1 capsule, Daily    traZODone (DESYREL) 50 mg, oral, Nightly       History obtained from:   Patient    Exam:   No data found.    A medically appropriate exam performed,  outlined above, given the known history and presentation.    EKG/Cardiac monitor:     Social Determinants of Health considered during this visit:     Medications given during visit:  Medications - No data to display     Diagnostic/tests:  Labs Reviewed - No data to display     No orders to display        MDM Summary:  Wounds are relatively uncomplicated at this time.  Advised patient to continue with dressing changes.  Prescribed short course of Lasix and Ceftin for any possible infection.  Patient referred to wound care center.  Return precautions were discussed.    We have discussed the diagnosis and risks, and we agree with discharging home to follow-up with appropriate physician as directed. We also discussed returning to the Emergency Department immediately if new or worsening symptoms occur. We have discussed the symptoms which are most concerning that necessitate immediate return. Pt symptoms have been well controlled here and the patient is safe for discharge with appropriate outpatient follow up. The patient has verbalized understanding to return to ER without delay for new or worsening pains or for any other symptoms or concerns. I utilized the discharge clinical management tool provided [employer] to help estimate risk of negative outcome for this patient.      Disposition:  ED Prescriptions       Medication Sig Dispense Start Date End Date Auth. Provider    cefuroxime (Ceftin) 500 mg tablet Take 1 tablet (500 mg) by mouth 2 times a day for 10 days. 20 tablet 12/16/2024 12/26/2024 Arvind Chavez PA-C    furosemide (Lasix) 20 mg tablet Take 0.5 tablets (10 mg) by mouth 2 times daily (morning and late afternoon) for 10 days. 10 tablet 12/16/2024 12/26/2024 Arvind Chavez PA-C              Procedure  Procedures     Arvind Chavez PA-C  12/17/24 [id number]

## 2024-12-16 NOTE — PROGRESS NOTES
RADIATION COMPLETION OF THERAPY NOTE    Patient Name:  Willy Reed  MRN:  [id number]  :  1945    Radiation Oncologist: No care team member to display   Referring Provider: No ref. provider found  Primary Care Provider: MYNOR Longoria    Brief History: Willy Reed is a 79 y.o. male with No matching staging information was found for the patient..  The patient completed radiotherapy as outlined below.    Radiation Treatment Summary:    Radiation Oncology   Radiation Treatments       Active   No active radiation treatments to show.     Completed   L_Spine (Started on 2024)   Most recent fraction: 900 cGy given on 12/10/2024   Total given: 2,700 cGy / 2,700 cGy  (3 of 3 fractions)   Elapsed Days: 4   Technique: SBRT                       Concurrent Chemotherapy:  Osimertinib, 28 Day Cycles   Treatment goal Control   Treatment line First Line   Status Active   Start Date 2024 (Planned)   End Date 2025 (Planned)   Treatment Medications osimertinib (Tagrisso) 80 mg tablet, 80 mg, oral, Daily, 0 of 3 cycles         CTCAE Toxicity Overview:   Toxicity Assessment          2024    16:46   Toxicity Assessment   Adverse Events Reviewed (WDL) Yes (Within Defined Limits)   Treatment Site Bone   Anorexia Grade 0   Anxiety Grade 0   Dehydration Grade 0   Depression Grade 0   Dermatitis Radiation Grade 0   Diarrhea Grade 0   Fatigue Grade 0   Nausea Grade 0   Pain Grade 0       2/10 with medication   Vomiting Grade 0   Joint Range of Motion Decreased Grade 0   Bone Pain Grade 1       2/10   Edema Limbs Grade 0   Pruritus Grade 0   Rash Acneiform Grade 0   Skin Hyperpigmentation Grade 0   Skin Hypopigmentation Grade 0   Skin Induration Grade 0   Skin Ulceration Grade 0     Patient Disposition:3/7/24 FUV     Pt encouraged to call with any and all issues or concerns    Future Appointments       Date / Time Provider Department Dept Phone    2024 2:00 PM (Arrive by 1:45 PM) Jewels SHELBY  ANJELICA Chand, LD [facility] [phone]    1/7/2025 10:30 AM (Arrive by 10:15 AM) Dat Noonan, APRN-CNP [facility] [phone]    1/8/2025 1:20 PM (Arrive by 1:05 PM) Connie Arriaga, APRN-CNP [facility] [phone]    1/15/2025 10:00 AM Con Martinez MD  [facility] [phone]    1/30/2025 10:00 AM (Arrive by 9:45 AM) PALMA MENTOR CT [facility] [phone]    2/5/2025 3:00 PM (Arrive by 2:45 PM) Maverick Boyd MD [facility] [phone]    3/6/2025 11:00 AM (Arrive by 10:45 AM) [facility] [phone]    3/7/2025 10:30 AM Greta Huang MD PhD [facility] [phone]    3/27/2025 2:45 PM Darien Crockett MD [facility]--[phone]

## 2024-12-16 NOTE — TELEPHONE ENCOUNTER
Pt called to report several fluid filled blisters on bilateral legs that developed on 12/12.  Blisters are approximately nickel sized and there are three in total.  One of them opened and drained sero-sanguinous fluid and is now scabbed over.  No redness of surrounding skin.  There has been mild itching today and no pain present.  No fevers, chest pain or difficulty breathing.  Pt is going to try to send photos via [employer]. Instructions emailed to him.    He started Tagrisso on 12/1.        Additional Information  • Negative:      Itching started today    Protocols used: Rash

## 2024-12-19 ENCOUNTER — NUTRITION (OUTPATIENT)
Dept: RADIATION ONCOLOGY | Facility: CLINIC | Age: 79
End: 2024-12-19
Payer: MEDICARE

## 2024-12-19 NOTE — PROGRESS NOTES
"Pt self referred.  RUL adenocarcinoma started Osimertinib, completed SBRT  Pt came in asking what he cannot eat on Osimertinib, states he received a pamphlet with the medication stating he could not eat fresh fruits and vegetables, unpasturized milk, seeds and cereal.   Unable to locate this information on the drug website  Presumed it to be related to potential low blood counts / immunocompromised   Provided and reviewed  food safety handout but advised pt to discuss with provider at next visit to confirm and that I am not able to connect cereal to the \"picture\" . That would not be a food considered high risk.   Advised him to bring the pamphlet to provider visit  Explained it is not the food itself that is harmful or has a harmful reaction with the drug which is what he thought. Pt was relieved.  Made Dat MAURICE CNP aware as pt has upcoming apt   "

## 2024-12-27 ENCOUNTER — OFFICE VISIT (OUTPATIENT)
Dept: WOUND CARE | Facility: HOSPITAL | Age: 79
End: 2024-12-27
Payer: MEDICARE

## 2024-12-27 DIAGNOSIS — L97.321 VENOUS STASIS ULCER OF LEFT ANKLE LIMITED TO BREAKDOWN OF SKIN WITHOUT VARICOSE VEINS: ICD-10-CM

## 2024-12-27 DIAGNOSIS — L97.311 VENOUS STASIS ULCER OF RIGHT ANKLE LIMITED TO BREAKDOWN OF SKIN WITHOUT VARICOSE VEINS: ICD-10-CM

## 2024-12-27 DIAGNOSIS — I87.2 VENOUS STASIS ULCER OF RIGHT ANKLE LIMITED TO BREAKDOWN OF SKIN WITHOUT VARICOSE VEINS: ICD-10-CM

## 2024-12-27 DIAGNOSIS — I87.2 VENOUS STASIS ULCER OF LEFT ANKLE LIMITED TO BREAKDOWN OF SKIN WITHOUT VARICOSE VEINS: ICD-10-CM

## 2024-12-27 PROCEDURE — 29580 STRAPPING UNNA BOOT: CPT

## 2024-12-27 PROCEDURE — 99214 OFFICE O/P EST MOD 30 MIN: CPT | Mod: 25

## 2025-01-03 ENCOUNTER — OFFICE VISIT (OUTPATIENT)
Dept: WOUND CARE | Facility: HOSPITAL | Age: 80
End: 2025-01-03
Payer: MEDICARE

## 2025-01-03 PROCEDURE — 99213 OFFICE O/P EST LOW 20 MIN: CPT

## 2025-01-07 ENCOUNTER — OFFICE VISIT (OUTPATIENT)
Dept: HEMATOLOGY/ONCOLOGY | Facility: CLINIC | Age: 80
End: 2025-01-07
Payer: MEDICARE

## 2025-01-07 ENCOUNTER — LAB (OUTPATIENT)
Dept: LAB | Facility: CLINIC | Age: 80
End: 2025-01-07
Payer: MEDICARE

## 2025-01-07 VITALS
SYSTOLIC BLOOD PRESSURE: 116 MMHG | DIASTOLIC BLOOD PRESSURE: 66 MMHG | WEIGHT: 231.7 LBS | RESPIRATION RATE: 17 BRPM | TEMPERATURE: 96.4 F | HEART RATE: 65 BPM | OXYGEN SATURATION: 96 % | BODY MASS INDEX: 29.75 KG/M2

## 2025-01-07 DIAGNOSIS — C80.1 METASTATIC CARCINOMA INVOLVING BONE WITH UNKNOWN PRIMARY SITE (MULTI): ICD-10-CM

## 2025-01-07 DIAGNOSIS — C79.51 METASTATIC CARCINOMA INVOLVING BONE WITH UNKNOWN PRIMARY SITE (MULTI): ICD-10-CM

## 2025-01-07 DIAGNOSIS — C79.51 SECONDARY CANCER OF BONE (MULTI): ICD-10-CM

## 2025-01-07 DIAGNOSIS — S32.010A CLOSED COMPRESSION FRACTURE OF BODY OF L1 VERTEBRA (MULTI): ICD-10-CM

## 2025-01-07 DIAGNOSIS — M84.48XA PATHOLOGIC FRACTURE OF LUMBAR VERTEBRA, INITIAL ENCOUNTER: ICD-10-CM

## 2025-01-07 DIAGNOSIS — C34.11 MALIGNANT NEOPLASM OF UPPER LOBE OF RIGHT LUNG (MULTI): Primary | ICD-10-CM

## 2025-01-07 LAB
ALBUMIN SERPL BCP-MCNC: 3.9 G/DL (ref 3.4–5)
ALP SERPL-CCNC: 71 U/L (ref 33–136)
ALT SERPL W P-5'-P-CCNC: 15 U/L (ref 10–52)
ANION GAP SERPL CALC-SCNC: 13 MMOL/L (ref 10–20)
AST SERPL W P-5'-P-CCNC: 15 U/L (ref 9–39)
BASOPHILS # BLD AUTO: 0.03 X10*3/UL (ref 0–0.1)
BASOPHILS NFR BLD AUTO: 0.5 %
BILIRUB SERPL-MCNC: 1.4 MG/DL (ref 0–1.2)
BUN SERPL-MCNC: 17 MG/DL (ref 6–23)
CALCIUM SERPL-MCNC: 9.2 MG/DL (ref 8.6–10.3)
CHLORIDE SERPL-SCNC: 102 MMOL/L (ref 98–107)
CO2 SERPL-SCNC: 26 MMOL/L (ref 21–32)
CREAT SERPL-MCNC: 1.21 MG/DL (ref 0.5–1.3)
EGFRCR SERPLBLD CKD-EPI 2021: 60 ML/MIN/1.73M*2
EOSINOPHIL # BLD AUTO: 0.21 X10*3/UL (ref 0–0.4)
EOSINOPHIL NFR BLD AUTO: 3.4 %
ERYTHROCYTE [DISTWIDTH] IN BLOOD BY AUTOMATED COUNT: 12.3 % (ref 11.5–14.5)
GLUCOSE SERPL-MCNC: 149 MG/DL (ref 74–99)
HCT VFR BLD AUTO: 44.7 % (ref 41–52)
HGB BLD-MCNC: 14.5 G/DL (ref 13.5–17.5)
IMM GRANULOCYTES # BLD AUTO: 0.02 X10*3/UL (ref 0–0.5)
IMM GRANULOCYTES NFR BLD AUTO: 0.3 % (ref 0–0.9)
LYMPHOCYTES # BLD AUTO: 0.94 X10*3/UL (ref 0.8–3)
LYMPHOCYTES NFR BLD AUTO: 15.4 %
MCH RBC QN AUTO: 30 PG (ref 26–34)
MCHC RBC AUTO-ENTMCNC: 32.4 G/DL (ref 32–36)
MCV RBC AUTO: 93 FL (ref 80–100)
MONOCYTES # BLD AUTO: 0.36 X10*3/UL (ref 0.05–0.8)
MONOCYTES NFR BLD AUTO: 5.9 %
NEUTROPHILS # BLD AUTO: 4.56 X10*3/UL (ref 1.6–5.5)
NEUTROPHILS NFR BLD AUTO: 74.5 %
NRBC BLD-RTO: 0 /100 WBCS (ref 0–0)
PLATELET # BLD AUTO: 80 X10*3/UL (ref 150–450)
POTASSIUM SERPL-SCNC: 3.7 MMOL/L (ref 3.5–5.3)
PROT SERPL-MCNC: 6.5 G/DL (ref 6.4–8.2)
RBC # BLD AUTO: 4.83 X10*6/UL (ref 4.5–5.9)
RBC MORPH BLD: NORMAL
SODIUM SERPL-SCNC: 137 MMOL/L (ref 136–145)
WBC # BLD AUTO: 6.1 X10*3/UL (ref 4.4–11.3)

## 2025-01-07 PROCEDURE — 85025 COMPLETE CBC W/AUTO DIFF WBC: CPT

## 2025-01-07 PROCEDURE — 99213 OFFICE O/P EST LOW 20 MIN: CPT | Performed by: NURSE PRACTITIONER

## 2025-01-07 PROCEDURE — 80053 COMPREHEN METABOLIC PANEL: CPT

## 2025-01-07 PROCEDURE — 36415 COLL VENOUS BLD VENIPUNCTURE: CPT

## 2025-01-07 ASSESSMENT — PAIN SCALES - GENERAL: PAINLEVEL_OUTOF10: 0-NO PAIN

## 2025-01-07 NOTE — PROGRESS NOTES
"  Patient ID: Willy Reed \"Harpal\" is a 79 y.o. 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     SITES OF DISEASE  3.1 x 2.0 cm R lung apical mass  8 mm RLL nodule  L1 vertebral body      MOLECULAR GENOMICS  TPS PD-L1<1%    Positive for AE1/AE3  Positive for EMA  Positive for TTF1 (8G7G3 clone)    Negative for GFAP  Negative for CDX2  Negative for PAX8   Negative for NKX3.1     PRIOR THERAPIES  None     CURRENT THERAPY  Osimertinib (80 mg) to commence upon completion of SBRT    CURRENT ONCOLOGICAL PROBLEMS  -L1 vertebral body mass     HISTORY OF PRESENT ILLNESS  79 year old male w/ PMH 3xCVA (1999, 2014, 2019), HTN, TOSHIA, atrial septal defect, HLD, OA, BPH, glaucoma, new dx of metastatic lung adenocarcinoma (L1 vertebral body, RUL, RLL) presenting to establish care.    Originally presented 9/30/2024 with 4 weeks of LBP and RLE pain. Underwent MRI lumbar spine 10/3/2024 with L1 vertebral body lesion concerning for malignancy. 10/16/24: PET/CT with increased uptake in L1. 10/31/24 L1 vertebral body biopsy with pathology concerning for pulmonary primary, TTF1 positive c/w lung adenocarcinoma.    11/8/24 CT chest with 8mm RLL nodule (previously 3mm 3/2024), 3.1 x 2.0 cm R lung apex mass    11/9/24 MRI brain negative    11/11/24 PET/CT with stable lung masses    Presenting 11/13/24 to establish care    PAST MEDICAL HISTORY  3xCVA (1999, 2014, 2019), HTN, TOSHIA, atrial septal defect, HLD, OA, BPH, glaucoma    SURGICAL HISTORY  ASD repair 1999  R knee, R shoulder, L shoulder arthroplasty     SOCIAL HISTORY  Lives with wife Judith, 2 children. 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 Denies recreational drug use.     FAMILY HISTORY  Mother - lymphoma, passed 57 from lymphoma  Brother - thyroid cancer " metastatic to lung, passed at 83 from cancer  Maternal uncle - cancer unspecified  Daughter - skin cancer    CURRENT MEDS REVIEWED    Current Outpatient Medications:     acetaminophen (Tylenol 8 HOUR) 650 mg ER tablet, Take 2 tablets (1,300 mg) by mouth 3 times a day as needed for mild pain (1 - 3). Do not crush, chew, or split., Disp: , Rfl:     apixaban (Eliquis) 5 mg tablet, Take 1 tablet (5 mg) by mouth every 12 hours., Disp: , Rfl:     aspirin 81 mg chewable tablet, Chew 1 tablet (81 mg) once daily., Disp: , Rfl:     atorvastatin (Lipitor) 40 mg tablet, Take 1 tablet (40 mg) by mouth once daily at bedtime., Disp: 90 tablet, Rfl: 3    brimonidine (AlphaGAN P) 0.1 % ophthalmic solution, Administer into affected eye(s) twice a day., Disp: , Rfl:     LORazepam (Ativan) 1 mg tablet, Take 1 tab, 1 hour prior to your MRI. (Patient taking differently: if needed for anxiety. Take 1 tab, 1 hour prior to your MRI.), Disp: 2 tablet, Rfl: 0    losartan-hydrochlorothiazide (Hyzaar) 100-25 mg tablet, Take 1 tablet by mouth once daily., Disp: , Rfl:     multivit with minerals/lutein (MULTIVITAMIN 50 PLUS ORAL), Take by mouth once daily., Disp: , Rfl:     netarsudiL-latanoprost (Rocklatan) 0.02-0.005 % drops, Administer into affected eye(s) once daily., Disp: , Rfl:     osimertinib (Tagrisso) 80 mg tablet, Take 1 tablet (80 mg total) by mouth once daily., Disp: , Rfl:     tamsulosin (Flomax) 0.4 mg 24 hr capsule, Take 1 capsule (0.4 mg) by mouth once daily., Disp: , Rfl:     traZODone (Desyrel) 50 mg tablet, Take 1 tablet (50 mg) by mouth once daily at bedtime., Disp: 90 tablet, Rfl: 1       ALLERGIES REVIEWED        SUBJECTIVE:  Patient presents today for routine follow-up evaluation. He started Osimertinib on December 12, 2024 and is tolerating it without side effects. He completed palliative radiation to L1 on December 10, 2024. He reports current pain level is 0/10. He has not required any oxycodone or acetaminophen since  2024.      Resolved - previous pain right hip radiating to right groin and occasinal right leg numbness. Pain is 100% better.  No further problems with constipation     He denies any fevers, chills or night sweats. No cough, chest pain or shortness of breath. No nausea or vomiting. No urinary symptoms. No rash. No neuropathy.     Yves sees Dr. Crockett stroke ,  and  no on lifelong anticoagulation     OBJECTIVE:  Vital Signs  /66 (BP Location: Right arm, Patient Position: Sitting, BP Cuff Size: Adult long)   Pulse 65   Temp 35.8 °C (96.4 °F) (Temporal)   Resp 17   Wt 105 kg (231 lb 11.3 oz) Comment: shoes/boots on- pt refused removal  SpO2 96%   BMI 29.75 kg/m²     Wt Readings from Last 5 Encounters:   25 105 kg (231 lb 11.3 oz)   24 110 kg (242 lb 15.2 oz)   24 112 kg (246 lb 6.2 oz)   24 106 kg (233 lb)   24 104 kg (230 lb 1.6 oz)       Physical Exam:  ECO-1  Pain: 0  Constitutional: Well developed, awake/alert/oriented x3, no distress, alert and cooperative  Eyes: PER. sclera anicteric  ENMT: Oral mucosa moist, no lesions or thrush identified  Respiratory/Thorax: Breathing is non-labored. Lungs are clear to auscultation bilaterally. No adventitious breath sounds  Cardiovascular: S1-S2. Regular rate and rhythm. No murmurs, rubs, or gallops appreciated  Gastrointestinal: Abdomen soft nontender, nondistended, normal active bowel sounds.  Musculoskeletal: ROM intact, no joint swelling, normal strength  Extremities: normal extremities, no cyanosis, no edema, no clubbing  Lymphatics: no palpable lymphadenopathy  Skin: no rash  Neurologic: alert and oriented x3. Nonfocal exam. No myoclonus  Psychological: Pleasant, appropriate and easily engaged       Diagnostic Results   Results:  Labs:  Results from last 7 days   Lab Units 25  1019   WBC AUTO x10*3/uL 6.1   HEMOGLOBIN g/dL 14.5   HEMATOCRIT % 44.7   PLATELETS AUTO x10*3/uL 80*   NEUTROS ABS  x10*3/uL 4.56   LYMPHS ABS AUTO x10*3/uL 0.94   MONOS ABS AUTO x10*3/uL 0.36   EOS ABS AUTO x10*3/uL 0.21   NEUTROS PCT AUTO % 74.5   LYMPHS PCT AUTO % 15.4   MONOS PCT AUTO % 5.9   EOS PCT AUTO % 3.4      Results from last 7 days   Lab Units 01/07/25  1019   GLUCOSE mg/dL 149*   SODIUM mmol/L 137   POTASSIUM mmol/L 3.7   CHLORIDE mmol/L 102   CO2 mmol/L 26   BUN mg/dL 17   CREATININE mg/dL 1.21   EGFR mL/min/1.73m*2 60*   CALCIUM mg/dL 9.2   ALBUMIN g/dL 3.9   PROTEIN TOTAL g/dL 6.5   BILIRUBIN TOTAL mg/dL 1.4*   ALK PHOS U/L 71   ALT U/L 15   AST U/L 15         MRI Brain 11/9/24    IMPRESSION:  1. No intracranial metastases.  2. Chronic intracranial findings as above including an old infarcts  in the right inferior cerebellum, left frontal lobe, and left  parietal lobe including within the postcentral gyrus.    PET 11/11/24    IMPRESSION:  1. Mildly hypermetabolic right apical lung mass, which is stable  compared to prior imaging, although a neoplastic process can not be  excluded.  2. Hypermetabolic L1 vertebral body, consistent with bone metastasis.  3. No hypermetabolic devonte metastasis.    MRI L spine 11/18/24  IMPRESSION:  Redemonstrated is a mass within the L3 vertebral body measuring 4.7 x  3.1 x 4.6 cm with extraosseous extension into the anterior epidural  space resulting in moderate to severe narrowing of the spinal canal.  There is a pathologic fracture of L1 with 75% loss of vertebral body  height. There is 1.1 cm of osseous retropulsion/extra osseous tumor  extending into the anterior epidural space.      Degenerative changes at the additional lumbar levels without  significant spinal canal stenosis. Mild-to-moderate neural foraminal  narrowing as detailed above.    Assessment/Plan   Mr. Harpal Reed is an 80 YO with a PMH of 3xCVA (1999, 2014, 2019), HTN, TOSHIA, atrial septal defect, HLD, OA, BPH, glaucoma seen for Stage JUMA ([id number]) adenocarcinoma of RUL.     We discussed the natural history of  metastatic NSCLC both with and without treatment. Patient has significant pain related to his L1 lesion but no alarm symptoms. He will undergo SBRT to L spine next week.  1/7/2025 - 100% pain relief after palliative RT    Mutational analysis notable for EGFR L858R. He is a great candidate for osimertinib - this was shared with him. We reviewed side effects and he would like to proceed after completion of SBRT. [facility] will [facility] next week - to begin once it arrives.    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 Scheduled for SBRT to L spine next week - resolved  -Discussed osimertinib and will proceed with osimertinib at 80 mg upon RT completion  - - 1/7/2025 tolerating without side effects  -Increase to oxycodone 5mg q4h for back pain scheduled and refilled - 1/7/2025 no longer requried  -Follow in 1 month + scans - Dr. Boyd      #Thrombocytopenia  -repeat CBC in 10 days       CHING Horowitz-CNP  [facility]  Thoracic + H&N Medical Oncology     I spent a total of 30+ minutes on the date of the service which included preparing to see the patient, face-to-face patient care, completing clinical documentation, obtaining and / or reviewing separately obtained history, counseling and educating the patient/family/caregiver, ordering medications, tests, or procedures, communicating with other healthcare providers (not separately reported), independently interpreting results, not separately reported, and communicating results to the patient/family/caregiver, Name and date of birth verified.

## 2025-01-07 NOTE — PROGRESS NOTES
Patient here today for follow up. He last saw Dr. Chavez on 12/4. Last RT was 12/10. His daughters help.    Fatigue: tires by the evening; gets about 4.5 - 5 hrs of sleep/night  PO intake: adequate, notices less of an appetite   Weight: -11lbs since 12/16  N/V/D/C: denies any issues    Medications and pharmacy preference reviewed with patient.   Started Osimertinib on 12/12.

## 2025-01-08 ENCOUNTER — APPOINTMENT (OUTPATIENT)
Dept: PRIMARY CARE | Facility: CLINIC | Age: 80
End: 2025-01-08
Payer: MEDICARE

## 2025-01-08 VITALS
HEART RATE: 71 BPM | WEIGHT: 232 LBS | HEIGHT: 74 IN | DIASTOLIC BLOOD PRESSURE: 68 MMHG | OXYGEN SATURATION: 97 % | BODY MASS INDEX: 29.77 KG/M2 | SYSTOLIC BLOOD PRESSURE: 118 MMHG

## 2025-01-08 DIAGNOSIS — C79.51 METASTATIC CARCINOMA TO BONE (MULTI): ICD-10-CM

## 2025-01-08 DIAGNOSIS — Z00.00 ROUTINE GENERAL MEDICAL EXAMINATION AT HEALTH CARE FACILITY: ICD-10-CM

## 2025-01-08 DIAGNOSIS — Z71.89 ADVANCED DIRECTIVES, COUNSELING/DISCUSSION: ICD-10-CM

## 2025-01-08 DIAGNOSIS — I10 PRIMARY HYPERTENSION: ICD-10-CM

## 2025-01-08 DIAGNOSIS — Z13.89 SCREENING FOR MULTIPLE CONDITIONS: ICD-10-CM

## 2025-01-08 DIAGNOSIS — Z00.00 MEDICARE ANNUAL WELLNESS VISIT, SUBSEQUENT: Primary | ICD-10-CM

## 2025-01-08 DIAGNOSIS — Z23 NEED FOR VACCINATION: ICD-10-CM

## 2025-01-08 DIAGNOSIS — S34.111A: ICD-10-CM

## 2025-01-08 DIAGNOSIS — Z97.4 WEARS HEARING AID: ICD-10-CM

## 2025-01-08 PROBLEM — I50.9 HEART FAILURE, UNSPECIFIED HF CHRONICITY, UNSPECIFIED HEART FAILURE TYPE: Status: RESOLVED | Noted: 2023-12-31 | Resolved: 2025-01-08

## 2025-01-08 PROCEDURE — G0439 PPPS, SUBSEQ VISIT: HCPCS | Performed by: NURSE PRACTITIONER

## 2025-01-08 PROCEDURE — 99214 OFFICE O/P EST MOD 30 MIN: CPT | Performed by: NURSE PRACTITIONER

## 2025-01-08 PROCEDURE — 3078F DIAST BP <80 MM HG: CPT | Performed by: NURSE PRACTITIONER

## 2025-01-08 PROCEDURE — 90662 IIV NO PRSV INCREASED AG IM: CPT | Performed by: NURSE PRACTITIONER

## 2025-01-08 PROCEDURE — 1170F FXNL STATUS ASSESSED: CPT | Performed by: NURSE PRACTITIONER

## 2025-01-08 PROCEDURE — G0008 ADMIN INFLUENZA VIRUS VAC: HCPCS | Performed by: NURSE PRACTITIONER

## 2025-01-08 PROCEDURE — 99497 ADVNCD CARE PLAN 30 MIN: CPT | Performed by: NURSE PRACTITIONER

## 2025-01-08 PROCEDURE — 1159F MED LIST DOCD IN RCRD: CPT | Performed by: NURSE PRACTITIONER

## 2025-01-08 PROCEDURE — 1036F TOBACCO NON-USER: CPT | Performed by: NURSE PRACTITIONER

## 2025-01-08 PROCEDURE — 1123F ACP DISCUSS/DSCN MKR DOCD: CPT | Performed by: NURSE PRACTITIONER

## 2025-01-08 PROCEDURE — 1160F RVW MEDS BY RX/DR IN RCRD: CPT | Performed by: NURSE PRACTITIONER

## 2025-01-08 PROCEDURE — 1158F ADVNC CARE PLAN TLK DOCD: CPT | Performed by: NURSE PRACTITIONER

## 2025-01-08 PROCEDURE — 3074F SYST BP LT 130 MM HG: CPT | Performed by: NURSE PRACTITIONER

## 2025-01-08 RX ORDER — ATORVASTATIN CALCIUM 40 MG/1
40 TABLET, FILM COATED ORAL NIGHTLY
Qty: 90 TABLET | Refills: 3 | Status: SHIPPED | OUTPATIENT
Start: 2025-01-08

## 2025-01-08 ASSESSMENT — ACTIVITIES OF DAILY LIVING (ADL)
GROCERY_SHOPPING: INDEPENDENT
TAKING_MEDICATION: INDEPENDENT
MANAGING_FINANCES: INDEPENDENT
DOING_HOUSEWORK: INDEPENDENT
BATHING: INDEPENDENT
DRESSING: INDEPENDENT

## 2025-01-08 ASSESSMENT — ENCOUNTER SYMPTOMS
OCCASIONAL FEELINGS OF UNSTEADINESS: 1
LOSS OF SENSATION IN FEET: 1
DEPRESSION: 0

## 2025-01-08 ASSESSMENT — PATIENT HEALTH QUESTIONNAIRE - PHQ9
2. FEELING DOWN, DEPRESSED OR HOPELESS: NOT AT ALL
SUM OF ALL RESPONSES TO PHQ9 QUESTIONS 1 AND 2: 0
1. LITTLE INTEREST OR PLEASURE IN DOING THINGS: NOT AT ALL
1. LITTLE INTEREST OR PLEASURE IN DOING THINGS: NOT AT ALL

## 2025-01-08 NOTE — ASSESSMENT & PLAN NOTE
- Counseled on healthy diet and regular exercise  - Fall avoidance information provided  - Personalized prevention plan provided   - Vaccines: flu vaccine today   - FBW ordered

## 2025-01-08 NOTE — ASSESSMENT & PLAN NOTE
Stable today: 118/68  Continue medications as prescribed   Orders:    Lipid Panel; Future    atorvastatin (Lipitor) 40 mg tablet; Take 1 tablet (40 mg) by mouth once daily at bedtime.

## 2025-01-08 NOTE — PATIENT INSTRUCTIONS
Thank you for seeing me today Willy Reed, it was a pleasure to see you again!    Today we did your Annual Medicare Wellness Exam and discussed the following:     Continue medications as prescribed     Flu vaccine today     Lab work ordered today.  Please have your blood drawn in the next 1-2 weeks.  You need to be FASTING for 12 hours prior to blood draw.  You may only have water.  Please contact your insurance company to ask about the best location to get blood drawn.  We will contact you with the results of your blood work and any necessary adjustments  to your plan of care, if you do not hear from us within 3-5 days of having your blood drawn, please call the office at [phone].    For assistance with scheduling referrals or consultations, please call [phone] or [phone].    For laboratory, radiology, and other tests, please call [phone] ([phone] for pediatrics).   For laboratory locations, please visit [email or web address]   If you do not get results within 7-10 days, or you have any questions or concerns, please send a message, call the office ([phone]), or return to the office for a follow-up appointment.     For acute/sick visits, if you are unable to get an office visit, you can do a  On Demand Virtual Visit that is accessible via your My Chart account.  For emergencies, call 9-1-1 or go to the nearest Emergency Department.     Please schedule additional appointment(s) to address concern(s) not addressed today.    Please review prescription inserts and published information for possible adverse effects of all medications.     In general, results are discussed over the phone or via  [facility].     You can see your health information, review clinical summaries from office visits & test results online when you follow your health with MY  Chart, a personal health record.   To sign up go to [email or web address].   If you need  assistance with signing up or trouble getting into your account call [facility] Patient Line 24/7 at [phone]     RTC ANNUALLY FOR AWV AND AS NEEDED     Happy New Year!    Glory Arriaga NP

## 2025-01-08 NOTE — ASSESSMENT & PLAN NOTE
Condition stable based on symptoms and exam.    Continue current medications and follow-up at least yearly.  Followed by Dr. Boyd

## 2025-01-08 NOTE — PROGRESS NOTES
"Subjective   Reason for Visit: Willy Reed is an 79 y.o. male here for a Medicare Wellness visit.     Past Medical, Surgical, and Family History reviewed and updated in chart.    Reviewed all medications by prescribing practitioner or clinical pharmacist (such as prescriptions, OTCs, herbal therapies and supplements) and documented in the medical record.    HPI  Willy \"Kisha" is an 78 yo male presenting today for AWV   LOV: 9/24/2024     [facility] and sees LEO Metcalf twice/year  [facility], except Atorvastatin, Trazodone, Losartan-hydrochlorothiazide and Tramadol     Dx: HTN, HLD, GLAUCOMA, BPH, CVA X 3 (1999, 2014, 2019), INSOMNIA, HEARING LOSS, TOSHIA, ASD, OA   **new dx of metastatic lung adenocarcinoma in Nov 2024; RUL w/mets to L1 vertebral body    Pt had MRI lumbar spine on 10/3/2024 for c/o LBP----> L1 vertebral body lesion concerning for malignancy.   PET/CT on 10/16/24---> with increased uptake in L1.   On 10/31/24 ---> L1 vertebral body biopsy with pathology concerning for pulmonary primary, TTF1 positive c/w lung adenocarcinoma   Pt had appt with Oncology yesterday, JOVANY Noonan  SBRT to L spine in Dec 2024, started osimertinib in Dec 2024, oxycodone 5mg q4h for back pain--> not currently taking it   He reports he is feeling okay  No pain at this time and is not taking pain medication     #HTN  Rx Losartan 100-hydrochlorothiazide 25 mg daily   BP today= 118/68  Cardiology: Dr. Crockett      #INSOMNIA  Pt takes Trazodone sometimes at bedtime for sleep, not nightly     #ARTHRITIC PAIN  Pt takes Tramadol every once in awhile for knee/shoulder pain, does not take daily/regularly   Pt is seeing Dr. Haney for his knee, has received injections     #CVA  1st occurrence 1999  had smaller strokes in 2014 and 2019  Rx Eliquis and Atorvastatin      #BPH  Taking Tamsulosin   no concerns     #HLD  FLP---> 106/49/65 Feb 2023, ordered today   Rx " "Atorvastatin 80 mg   denies myalgias     Allergies   Allergen Reactions    Morphine Nausea And Vomiting       Patient Care Team:  CHING Longoria-CNP as PCP - General (Family Medicine)  Keily Phelps MD as Primary Care Provider  Maverick Boyd MD as Consulting Physician (Hematology and Oncology)  Misael Simons RN as Nurse Navigator (Hematology and Oncology)       Review of Systems  ROS was completed and all systems are negative with the exception of what was noted in the the HPI.       Objective   Vitals:  /68   Pulse 71   Ht 1.88 m (6' 2\")   Wt 105 kg (232 lb)   SpO2 97%   BMI 29.79 kg/m²       Current Outpatient Medications   Medication Instructions    acetaminophen (TYLENOL 8 HOUR) 1,300 mg, 3 times daily PRN    apixaban (Eliquis) 5 mg tablet 1 tablet, Every 12 hours    aspirin 81 mg chewable tablet 1 tablet, Daily    atorvastatin (LIPITOR) 40 mg, oral, Nightly    brimonidine (AlphaGAN P) 0.1 % ophthalmic solution 2 times daily    LORazepam (Ativan) 1 mg tablet Take 1 tab, 1 hour prior to your MRI.    losartan-hydrochlorothiazide (Hyzaar) 100-25 mg tablet 1 tablet, Daily    multivit with minerals/lutein (MULTIVITAMIN 50 PLUS ORAL) Daily RT    netarsudiL-latanoprost (Rocklatan) 0.02-0.005 % drops Daily RT    osimertinib (Tagrisso) 80 mg tablet 1 tablet, Daily    tamsulosin (Flomax) 0.4 mg 24 hr capsule 1 capsule, Daily    traZODone (DESYREL) 50 mg, oral, Nightly         Physical Exam  Vitals reviewed.   Cardiovascular:      Pulses: Normal pulses.      Heart sounds: Normal heart sounds.   Pulmonary:      Effort: Pulmonary effort is normal.      Breath sounds: Examination of the right-upper field reveals decreased breath sounds. Decreased breath sounds present.   Neurological:      Mental Status: He is alert.           Assessment & Plan  Medicare annual wellness visit, subsequent  - Counseled on healthy diet and regular exercise  - Fall avoidance information provided  - Personalized " prevention plan provided   - Vaccines: flu vaccine today   - FBW ordered            Primary hypertension  Stable today: 118/68  Continue medications as prescribed   Orders:    Lipid Panel; Future    atorvastatin (Lipitor) 40 mg tablet; Take 1 tablet (40 mg) by mouth once daily at bedtime.    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    Continue current medications and follow-up at least yearly.  Followed by Dr. 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    Continue current medications and follow-up at least yearly.  Followed by Dr. Boyd        Wears hearing aid  No concerns     Screening for multiple conditions  Depression screening completed using PHQ-2 questions with results documented in the chart/encounter (15 minutes)  See rooming screening section for documentation and/or progress note for additional information.         Advanced directives, counseling/discussion  I spent > 16 minutes face to face with Willy Reed discussing his/her advance directives, including a Living Will, Healthcare POA as well as specific end of life choices and/or directives, and pt was provided with packet to return next visit.    HCPOA: wife  Pt is Full Code         Need for vaccination  Flu vaccine today   Orders:    Flu vaccine, high dose    Routine general medical examination at health care facility    Orders:    1 Year Follow Up In Primary Care - Wellness Exam; Future

## 2025-01-08 NOTE — ASSESSMENT & PLAN NOTE
I spent > 16 minutes face to face with Willy ALONSO Rogerdianna discussing his/her advance directives, including a Living Will, Healthcare POA as well as specific end of life choices and/or directives, and pt was provided with packet to return next visit.    HCPOA: wife  Pt is Full Code

## 2025-01-15 ENCOUNTER — LAB (OUTPATIENT)
Dept: LAB | Facility: LAB | Age: 80
End: 2025-01-15
Payer: MEDICARE

## 2025-01-15 ENCOUNTER — APPOINTMENT (OUTPATIENT)
Dept: OTOLARYNGOLOGY | Facility: CLINIC | Age: 80
End: 2025-01-15
Payer: MEDICARE

## 2025-01-15 VITALS — TEMPERATURE: 96.5 F | HEIGHT: 74 IN | WEIGHT: 232 LBS | BODY MASS INDEX: 29.77 KG/M2

## 2025-01-15 DIAGNOSIS — G47.33 OBSTRUCTIVE SLEEP APNEA: ICD-10-CM

## 2025-01-15 DIAGNOSIS — C34.11 MALIGNANT NEOPLASM OF UPPER LOBE OF RIGHT LUNG (MULTI): ICD-10-CM

## 2025-01-15 DIAGNOSIS — H61.23 BILATERAL IMPACTED CERUMEN: Primary | ICD-10-CM

## 2025-01-15 DIAGNOSIS — I10 PRIMARY HYPERTENSION: ICD-10-CM

## 2025-01-15 DIAGNOSIS — H90.3 BILATERAL SENSORINEURAL HEARING LOSS: ICD-10-CM

## 2025-01-15 LAB
ALBUMIN SERPL BCP-MCNC: 4.1 G/DL (ref 3.4–5)
ALP SERPL-CCNC: 68 U/L (ref 33–136)
ALT SERPL W P-5'-P-CCNC: 15 U/L (ref 10–52)
ANION GAP SERPL CALC-SCNC: 10 MMOL/L (ref 10–20)
AST SERPL W P-5'-P-CCNC: 18 U/L (ref 9–39)
BASOPHILS # BLD AUTO: 0.04 X10*3/UL (ref 0–0.1)
BASOPHILS NFR BLD AUTO: 0.6 %
BILIRUB SERPL-MCNC: 0.8 MG/DL (ref 0–1.2)
BUN SERPL-MCNC: 20 MG/DL (ref 6–23)
CALCIUM SERPL-MCNC: 8.9 MG/DL (ref 8.6–10.3)
CHLORIDE SERPL-SCNC: 104 MMOL/L (ref 98–107)
CHOLEST SERPL-MCNC: 119 MG/DL (ref 0–199)
CHOLESTEROL/HDL RATIO: 2.2
CO2 SERPL-SCNC: 28 MMOL/L (ref 21–32)
CREAT SERPL-MCNC: 1.13 MG/DL (ref 0.5–1.3)
EGFRCR SERPLBLD CKD-EPI 2021: 65 ML/MIN/1.73M*2
EOSINOPHIL # BLD AUTO: 0.16 X10*3/UL (ref 0–0.4)
EOSINOPHIL NFR BLD AUTO: 2.4 %
ERYTHROCYTE [DISTWIDTH] IN BLOOD BY AUTOMATED COUNT: 12.2 % (ref 11.5–14.5)
GLUCOSE SERPL-MCNC: 121 MG/DL (ref 74–99)
HCT VFR BLD AUTO: 45.7 % (ref 41–52)
HDLC SERPL-MCNC: 53.4 MG/DL
HGB BLD-MCNC: 14.5 G/DL (ref 13.5–17.5)
IMM GRANULOCYTES # BLD AUTO: 0.01 X10*3/UL (ref 0–0.5)
IMM GRANULOCYTES NFR BLD AUTO: 0.1 % (ref 0–0.9)
LDLC SERPL CALC-MCNC: 50 MG/DL
LYMPHOCYTES # BLD AUTO: 1.13 X10*3/UL (ref 0.8–3)
LYMPHOCYTES NFR BLD AUTO: 16.7 %
MCH RBC QN AUTO: 30.3 PG (ref 26–34)
MCHC RBC AUTO-ENTMCNC: 31.7 G/DL (ref 32–36)
MCV RBC AUTO: 95 FL (ref 80–100)
MONOCYTES # BLD AUTO: 0.36 X10*3/UL (ref 0.05–0.8)
MONOCYTES NFR BLD AUTO: 5.3 %
NEUTROPHILS # BLD AUTO: 5.08 X10*3/UL (ref 1.6–5.5)
NEUTROPHILS NFR BLD AUTO: 74.9 %
NON HDL CHOLESTEROL: 66 MG/DL (ref 0–149)
NRBC BLD-RTO: 0 /100 WBCS (ref 0–0)
PLATELET # BLD AUTO: 100 X10*3/UL (ref 150–450)
POTASSIUM SERPL-SCNC: 4 MMOL/L (ref 3.5–5.3)
PROT SERPL-MCNC: 6.1 G/DL (ref 6.4–8.2)
RBC # BLD AUTO: 4.79 X10*6/UL (ref 4.5–5.9)
SODIUM SERPL-SCNC: 138 MMOL/L (ref 136–145)
TRIGL SERPL-MCNC: 78 MG/DL (ref 0–149)
VLDL: 16 MG/DL (ref 0–40)
WBC # BLD AUTO: 6.8 X10*3/UL (ref 4.4–11.3)

## 2025-01-15 PROCEDURE — 85025 COMPLETE CBC W/AUTO DIFF WBC: CPT

## 2025-01-15 PROCEDURE — 99213 OFFICE O/P EST LOW 20 MIN: CPT | Performed by: OTOLARYNGOLOGY

## 2025-01-15 PROCEDURE — 80053 COMPREHEN METABOLIC PANEL: CPT

## 2025-01-15 PROCEDURE — 1123F ACP DISCUSS/DSCN MKR DOCD: CPT | Performed by: OTOLARYNGOLOGY

## 2025-01-15 PROCEDURE — 1159F MED LIST DOCD IN RCRD: CPT | Performed by: OTOLARYNGOLOGY

## 2025-01-15 PROCEDURE — 80061 LIPID PANEL: CPT

## 2025-01-15 PROCEDURE — 1036F TOBACCO NON-USER: CPT | Performed by: OTOLARYNGOLOGY

## 2025-01-15 PROCEDURE — 1160F RVW MEDS BY RX/DR IN RCRD: CPT | Performed by: OTOLARYNGOLOGY

## 2025-01-15 NOTE — PROGRESS NOTES
"RASHAD Reed \"Harpal\" is a 79 y.o. male prone to wax.  Requires cleaning every 6 months.  Wears bilateral hearing aids.  Has CPAP and uses faithfully.  Doing very well with this.  Here for 6-month cerumen management.  He has not having specific plugging, otalgia or otorrhea.      Past Medical History:   Diagnosis Date    Cerebral infarction, unspecified 01/25/2022    CVA (cerebral vascular accident)    Essential (primary) hypertension 09/22/2022    HTN (hypertension)    Personal history of other diseases of the circulatory system 12/17/2019    History of atrial septal defect    Personal history of other diseases of the nervous system and sense organs     History of sleep apnea    Sleep apnea             Medications:     Current Outpatient Medications:     apixaban (Eliquis) 5 mg tablet, Take 1 tablet (5 mg) by mouth every 12 hours., Disp: , Rfl:     aspirin 81 mg chewable tablet, Chew 1 tablet (81 mg) once daily., Disp: , Rfl:     atorvastatin (Lipitor) 40 mg tablet, Take 1 tablet (40 mg) by mouth once daily at bedtime., Disp: 90 tablet, Rfl: 3    brimonidine (AlphaGAN P) 0.1 % ophthalmic solution, Administer into affected eye(s) twice a day., Disp: , Rfl:     LORazepam (Ativan) 1 mg tablet, Take 1 tab, 1 hour prior to your MRI., Disp: 2 tablet, Rfl: 0    losartan-hydrochlorothiazide (Hyzaar) 100-25 mg tablet, Take 1 tablet by mouth once daily., Disp: , Rfl:     multivit with minerals/lutein (MULTIVITAMIN 50 PLUS ORAL), Take by mouth once daily., Disp: , Rfl:     osimertinib (Tagrisso) 80 mg tablet, Take 1 tablet (80 mg total) by mouth once daily., Disp: , Rfl:     tamsulosin (Flomax) 0.4 mg 24 hr capsule, Take 1 capsule (0.4 mg) by mouth once daily., Disp: , Rfl:     acetaminophen (Tylenol 8 HOUR) 650 mg ER tablet, Take 2 tablets (1,300 mg) by mouth 3 times a day as needed for mild pain (1 - 3). Do not crush, chew, or split. (Patient not taking: Reported on 1/15/2025), Disp: , Rfl:     netarsudiL-latanoprost " "(Rocklatan) 0.02-0.005 % drops, Administer into affected eye(s) once daily., Disp: , Rfl:     traZODone (Desyrel) 50 mg tablet, Take 1 tablet (50 mg) by mouth once daily at bedtime., Disp: 90 tablet, Rfl: 1     Allergies:  Allergies   Allergen Reactions    Morphine Nausea And Vomiting        Physical Exam:  Last Recorded Vitals  Temperature 35.8 °C (96.5 °F), height 1.88 m (6' 2\"), weight 105 kg (232 lb).  General:     General appearance: Well-developed, well-nourished in no acute distress.       Voice:  normal       Head/face: Normal appearance; nontender to palpation     Facial nerve function: Normal and symmetric bilaterally.    Oral/oropharynx:     Oral vestibule: Normal labial and gingival mucosa     Tongue/floor of mouth: Normal without lesion     Oropharynx: Clear.  No lesions present of the hard/soft palate, posterior pharynx    Neck:     Neck: Normal appearance, trachea midline     Salivary glands: Normal to palpation bilaterally     Lymph nodes: No cervical lymphadenopathy to palpation     Thyroid: No thyromegaly.  No palpable nodules     Range of motion: Normal    Neurological:     Cortical functions: Alert and oriented x3, appropriate affect       Larynx/hypopharynx:     Laryngeal findings: Mirror exam inadequate or limited secondary to enlarged base of tongue and/or excessive gagging.      Ear:     Ear canal: Normal bilaterally after cleaning occlusive cerumen impaction bilaterally with microscope, suction, alligator     Tympanic membrane: Intact and mobile bilaterally     Pinna: Normal bilaterally     Hearing:  Gross hearing assessment normal by voice    Nose:     Visualized using: Anterior rhinoscopy     Nasopharynx: Inadequate mirror exam secondary to gag, anatomy.       Nasal dorsum: Nontraumatic midline appearance     Septum: Midline     Inferior turbinates: Normally sized     Mucosa: Bilateral, pink, normal appearing       Assessment/Plan   Ear canals cleaned bilaterally.  Hearing aids were " replaced.  He will continue his hearing aids and follow-up with us in 6 months.  He will continue his CPAP in the meantime.  I will see him back in 6 months, sooner as needed      Con Martinez MD

## 2025-01-16 ENCOUNTER — NURSE TRIAGE (OUTPATIENT)
Dept: ADMISSION | Facility: HOSPITAL | Age: 80
End: 2025-01-16
Payer: MEDICARE

## 2025-01-16 NOTE — TELEPHONE ENCOUNTER
Pt called to report that he developed runny nose, occasional cough and scratchy throat 2 days ago.  Cough is non-productive. He's been afebrile and denies chest pain or difficulty breathing. He has not tested for COVID as he does not have a home test available.    This is his first illness since starting tagrisso and he wanted to make team aware in the event he needs to hold medication.    Additional Information   Commented on: What helps these problems?     No interventions tried    Protocols used: Cough

## 2025-01-17 NOTE — TELEPHONE ENCOUNTER
"Pt called back, tested negative for covid. Continues to deny fever, dyspnea, but states is waking at night \"burning up\".   Pt is not sleeping, nasal drainage is clear, productive cough mucous is clear.   Pt would like to know what he can take for symptoms.   Next FUV 2/5  "

## 2025-01-17 NOTE — TELEPHONE ENCOUNTER
Per Dr Boyd: tylenol/advil for fever and headache, can use OTC for viral upper resp infection as well.     Pt updated, verbalized understanding/agreement with plan.   Aware to call with any new/worsening concerns.

## 2025-01-20 ENCOUNTER — APPOINTMENT (OUTPATIENT)
Dept: WOUND CARE | Facility: HOSPITAL | Age: 80
End: 2025-01-20
Payer: MEDICARE

## 2025-01-30 ENCOUNTER — HOSPITAL ENCOUNTER (OUTPATIENT)
Dept: RADIOLOGY | Facility: CLINIC | Age: 80
Discharge: HOME | End: 2025-01-30
Payer: MEDICARE

## 2025-01-30 DIAGNOSIS — M84.48XA PATHOLOGIC FRACTURE OF LUMBAR VERTEBRA, INITIAL ENCOUNTER: ICD-10-CM

## 2025-01-30 DIAGNOSIS — C79.51 METASTATIC CARCINOMA INVOLVING BONE WITH UNKNOWN PRIMARY SITE (MULTI): ICD-10-CM

## 2025-01-30 DIAGNOSIS — S32.010A CLOSED COMPRESSION FRACTURE OF BODY OF L1 VERTEBRA (MULTI): ICD-10-CM

## 2025-01-30 DIAGNOSIS — C80.1 METASTATIC CARCINOMA INVOLVING BONE WITH UNKNOWN PRIMARY SITE (MULTI): ICD-10-CM

## 2025-01-30 DIAGNOSIS — C79.51 SECONDARY CANCER OF BONE (MULTI): ICD-10-CM

## 2025-01-30 PROCEDURE — 74177 CT ABD & PELVIS W/CONTRAST: CPT

## 2025-01-30 PROCEDURE — 2550000001 HC RX 255 CONTRASTS: Performed by: STUDENT IN AN ORGANIZED HEALTH CARE EDUCATION/TRAINING PROGRAM

## 2025-01-30 RX ADMIN — IOHEXOL 75 ML: 350 INJECTION, SOLUTION INTRAVENOUS at 10:18

## 2025-02-05 ENCOUNTER — LAB (OUTPATIENT)
Dept: LAB | Facility: CLINIC | Age: 80
End: 2025-02-05
Payer: MEDICARE

## 2025-02-05 ENCOUNTER — OFFICE VISIT (OUTPATIENT)
Dept: HEMATOLOGY/ONCOLOGY | Facility: CLINIC | Age: 80
End: 2025-02-05
Payer: MEDICARE

## 2025-02-05 VITALS
SYSTOLIC BLOOD PRESSURE: 135 MMHG | DIASTOLIC BLOOD PRESSURE: 72 MMHG | WEIGHT: 229 LBS | RESPIRATION RATE: 18 BRPM | TEMPERATURE: 96.8 F | OXYGEN SATURATION: 95 % | HEART RATE: 70 BPM | BODY MASS INDEX: 29.4 KG/M2

## 2025-02-05 DIAGNOSIS — S32.010A CLOSED COMPRESSION FRACTURE OF BODY OF L1 VERTEBRA (MULTI): ICD-10-CM

## 2025-02-05 DIAGNOSIS — C79.51 SECONDARY CANCER OF BONE (MULTI): ICD-10-CM

## 2025-02-05 DIAGNOSIS — M84.48XA PATHOLOGIC FRACTURE OF LUMBAR VERTEBRA, INITIAL ENCOUNTER: ICD-10-CM

## 2025-02-05 DIAGNOSIS — C79.51 METASTATIC CARCINOMA INVOLVING BONE WITH UNKNOWN PRIMARY SITE (MULTI): ICD-10-CM

## 2025-02-05 DIAGNOSIS — C80.1 METASTATIC CARCINOMA INVOLVING BONE WITH UNKNOWN PRIMARY SITE (MULTI): ICD-10-CM

## 2025-02-05 LAB
ALBUMIN SERPL BCP-MCNC: 4.2 G/DL (ref 3.4–5)
ALP SERPL-CCNC: 64 U/L (ref 33–136)
ALT SERPL W P-5'-P-CCNC: 16 U/L (ref 10–52)
ANION GAP SERPL CALC-SCNC: 14 MMOL/L (ref 10–20)
AST SERPL W P-5'-P-CCNC: 18 U/L (ref 9–39)
BASOPHILS # BLD AUTO: 0.03 X10*3/UL (ref 0–0.1)
BASOPHILS NFR BLD AUTO: 0.5 %
BILIRUB SERPL-MCNC: 0.8 MG/DL (ref 0–1.2)
BUN SERPL-MCNC: 19 MG/DL (ref 6–23)
CALCIUM SERPL-MCNC: 9.4 MG/DL (ref 8.6–10.6)
CHLORIDE SERPL-SCNC: 103 MMOL/L (ref 98–107)
CO2 SERPL-SCNC: 28 MMOL/L (ref 21–32)
CREAT SERPL-MCNC: 1.13 MG/DL (ref 0.5–1.3)
EGFRCR SERPLBLD CKD-EPI 2021: 65 ML/MIN/1.73M*2
EOSINOPHIL # BLD AUTO: 0.12 X10*3/UL (ref 0–0.4)
EOSINOPHIL NFR BLD AUTO: 1.9 %
ERYTHROCYTE [DISTWIDTH] IN BLOOD BY AUTOMATED COUNT: 12.6 % (ref 11.5–14.5)
GLUCOSE SERPL-MCNC: 91 MG/DL (ref 74–99)
HCT VFR BLD AUTO: 45.5 % (ref 41–52)
HGB BLD-MCNC: 14.8 G/DL (ref 13.5–17.5)
IMM GRANULOCYTES # BLD AUTO: 0.01 X10*3/UL (ref 0–0.5)
IMM GRANULOCYTES NFR BLD AUTO: 0.2 % (ref 0–0.9)
LYMPHOCYTES # BLD AUTO: 0.88 X10*3/UL (ref 0.8–3)
LYMPHOCYTES NFR BLD AUTO: 13.8 %
MCH RBC QN AUTO: 29.8 PG (ref 26–34)
MCHC RBC AUTO-ENTMCNC: 32.5 G/DL (ref 32–36)
MCV RBC AUTO: 92 FL (ref 80–100)
MONOCYTES # BLD AUTO: 0.4 X10*3/UL (ref 0.05–0.8)
MONOCYTES NFR BLD AUTO: 6.3 %
NEUTROPHILS # BLD AUTO: 4.92 X10*3/UL (ref 1.6–5.5)
NEUTROPHILS NFR BLD AUTO: 77.3 %
NRBC BLD-RTO: ABNORMAL /100{WBCS}
PLATELET # BLD AUTO: 98 X10*3/UL (ref 150–450)
POTASSIUM SERPL-SCNC: 4.2 MMOL/L (ref 3.5–5.3)
PROT SERPL-MCNC: 6.5 G/DL (ref 6.4–8.2)
RBC # BLD AUTO: 4.96 X10*6/UL (ref 4.5–5.9)
SODIUM SERPL-SCNC: 141 MMOL/L (ref 136–145)
WBC # BLD AUTO: 6.4 X10*3/UL (ref 4.4–11.3)

## 2025-02-05 PROCEDURE — 1123F ACP DISCUSS/DSCN MKR DOCD: CPT | Performed by: STUDENT IN AN ORGANIZED HEALTH CARE EDUCATION/TRAINING PROGRAM

## 2025-02-05 PROCEDURE — 1159F MED LIST DOCD IN RCRD: CPT | Performed by: STUDENT IN AN ORGANIZED HEALTH CARE EDUCATION/TRAINING PROGRAM

## 2025-02-05 PROCEDURE — G2211 COMPLEX E/M VISIT ADD ON: HCPCS | Performed by: STUDENT IN AN ORGANIZED HEALTH CARE EDUCATION/TRAINING PROGRAM

## 2025-02-05 PROCEDURE — 99215 OFFICE O/P EST HI 40 MIN: CPT | Performed by: STUDENT IN AN ORGANIZED HEALTH CARE EDUCATION/TRAINING PROGRAM

## 2025-02-05 PROCEDURE — 84075 ASSAY ALKALINE PHOSPHATASE: CPT

## 2025-02-05 PROCEDURE — 85025 COMPLETE CBC W/AUTO DIFF WBC: CPT

## 2025-02-05 PROCEDURE — 1126F AMNT PAIN NOTED NONE PRSNT: CPT | Performed by: STUDENT IN AN ORGANIZED HEALTH CARE EDUCATION/TRAINING PROGRAM

## 2025-02-05 PROCEDURE — 3075F SYST BP GE 130 - 139MM HG: CPT | Performed by: STUDENT IN AN ORGANIZED HEALTH CARE EDUCATION/TRAINING PROGRAM

## 2025-02-05 PROCEDURE — 36415 COLL VENOUS BLD VENIPUNCTURE: CPT

## 2025-02-05 PROCEDURE — 1036F TOBACCO NON-USER: CPT | Performed by: STUDENT IN AN ORGANIZED HEALTH CARE EDUCATION/TRAINING PROGRAM

## 2025-02-05 PROCEDURE — 3078F DIAST BP <80 MM HG: CPT | Performed by: STUDENT IN AN ORGANIZED HEALTH CARE EDUCATION/TRAINING PROGRAM

## 2025-02-05 ASSESSMENT — PAIN SCALES - GENERAL: PAINLEVEL_OUTOF10: 0-NO PAIN

## 2025-02-05 NOTE — PROGRESS NOTES
Harpal is here with his S-I-L Ed for follow up with Dr Boyd. He reports he is doing well and that the XRT has completely resolved his R hip, groin pain. He stated he has had decreased appetite 2/2 getting full quickly. He has lost 3# since last visit. Meds and allergies reviewed and updated. MD made aware.  Education Documentation  Healthy Lifestyle, taught by Nimisha Garcia RN at 2/5/2025  3:36 PM.  Learner: Family, Patient  Readiness: Acceptance  Method: Explanation  Response: Verbalizes Understanding    Monitoring Weight, taught by Nimisha Garcia RN at 2/5/2025  3:36 PM.  Learner: Family, Patient  Readiness: Acceptance  Method: Explanation  Response: Verbalizes Understanding    Tips for Daily Living, taught by Nimisha Garcia RN at 2/5/2025  3:36 PM.  Learner: Family, Patient  Readiness: Acceptance  Method: Explanation  Response: Verbalizes Understanding    Pain Rating Scale, taught by Nimisha Garcia RN at 2/5/2025  3:36 PM.  Learner: Family, Patient  Readiness: Acceptance  Method: Explanation  Response: Verbalizes Understanding    Changes in Appetite, taught by Nimisha Garcia RN at 2/5/2025  3:36 PM.  Learner: Family, Patient  Readiness: Acceptance  Method: Explanation  Response: Verbalizes Understanding    Treatment Plan and Schedule, taught by Nimisha Garcia RN at 2/5/2025  3:36 PM.  Learner: Family, Patient  Readiness: Acceptance  Method: Explanation  Response: Verbalizes Understanding    General Medication Information, taught by Nimisha Garcia RN at 2/5/2025  3:36 PM.  Learner: Family, Patient  Readiness: Acceptance  Method: Explanation  Response: Verbalizes Understanding    Supportive Medications, taught by Nimisha Garcia RN at 2/5/2025  3:36 PM.  Learner: Family, Patient  Readiness: Acceptance  Method: Explanation  Response: Verbalizes Understanding    When and How to Contact Clinic, taught by Nimisha Garcia RN at 2/5/2025  3:36 PM.  Learner: Family, Patient  Readiness:  Acceptance  Method: Explanation  Response: Verbalizes Understanding    Education Comments  No comments found.

## 2025-02-05 NOTE — PROGRESS NOTES
"  Patient ID: Willy Reed \"Harpal\" is a 79 y.o. male    Primary Care Provider: Connie Arriaga, CHING-CNP    DIAGNOSIS AND STAGING  Stage JUMA (cT4 for ipsilateral nodule)NxM1b adenocarcinoma of RUL     SITES OF DISEASE  3.1 x 2.0 cm R lung apical mass  8 mm RLL nodule  L1 vertebral body      MOLECULAR GENOMICS  TPS PD-L1<1%    Positive for AE1/AE3  Positive for EMA  Positive for TTF1 ([id number] clone)    Negative for GFAP  Negative for CDX2  Negative for PAX8   Negative for NKX3.1     PRIOR THERAPIES  12/10/2024: Completion SBRT (27-30 cGy/3f) to L spine by Dr. Huang     CURRENT THERAPY  Osimertinib (80 mg) to commence upon completion of SBRT    CURRENT ONCOLOGICAL PROBLEMS  -L1 vertebral body mass     HISTORY OF PRESENT ILLNESS  79 year old male w/ PMH 3xCVA (1999, 2014, 2019), HTN, TOSHIA, atrial septal defect, HLD, OA, BPH, glaucoma, new dx of metastatic lung adenocarcinoma (L1 vertebral body, RUL, RLL) presenting to establish care.    Originally presented 9/30/2024 with 4 weeks of LBP and RLE pain. Underwent MRI lumbar spine 10/3/2024 with L1 vertebral body lesion concerning for malignancy. 10/16/24: PET/CT with increased uptake in L1. 10/31/24 L1 vertebral body biopsy with pathology concerning for pulmonary primary, TTF1 positive c/w lung adenocarcinoma.    11/8/24 CT chest with 8mm RLL nodule (previously 3mm 3/2024), 3.1 x 2.0 cm R lung apex mass    11/9/24 MRI brain negative    11/11/24 PET/CT with stable lung masses    Presenting 11/13/24 to establish care    PAST MEDICAL HISTORY  3xCVA (1999, 2014, 2019), HTN, TOSHIA, atrial septal defect, HLD, OA, BPH, glaucoma    SURGICAL HISTORY  ASD repair 1999  R knee, R shoulder, L shoulder arthroplasty     SOCIAL HISTORY  Lives with wife Judith, 2 children. 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 Denies recreational drug use.     FAMILY HISTORY  Mother - " lymphoma, passed 57 from lymphoma  Brother - thyroid cancer metastatic to lung, passed at 83 from cancer  Maternal uncle - cancer unspecified  Daughter - skin cancer    CURRENT MEDS REVIEWED       ALLERGIES REVIEWED        SUBJECTIVE: Patient doing better today. He completed radiation and his pain is dramatically improved. He is otherwise without complaint. He is taking osimertinib without issue. He had some leg swelling in December that has self resolved.    A 13 point review of systems was performed, with significant findings documented above in subjective history.    OBJECTIVE:  Vitals:    02/05/25 1515   BP: 135/72   Pulse: 70   Resp: 18   Temp: 36 °C (96.8 °F)   SpO2: 95%      Body surface area is 2.33 meters squared.     Wt Readings from Last 5 Encounters:   02/05/25 104 kg (229 lb)   01/15/25 105 kg (232 lb)   01/08/25 105 kg (232 lb)   01/07/25 105 kg (231 lb 11.3 oz)   12/16/24 110 kg (242 lb 15.2 oz)       ECOGSCORE: 2  GEN: pleasant, well appearing, in NAD  EYES: PERRLA, EOMI  CV: RRR, normal S1/S2, no m/r/g  PULM: CTAB  ABD: soft, nontender, nondistended  NEURO: no FND  PSYCH: appropriate mood and affect  MSK: no joint swelling grossly noted  EXT: trace LE edema up to the shins bilaterally  SKIN: warm and dry, no lesions grossly noted    Diagnostic Results   Results:  Labs:  Lab Results   Component Value Date    WBC 6.8 01/15/2025    HGB 14.5 01/15/2025    HCT 45.7 01/15/2025    MCV 95 01/15/2025     (L) 01/15/2025      Lab Results   Component Value Date    NEUTROABS 5.08 01/15/2025        Lab Results   Component Value Date    GLUCOSE 121 (H) 01/15/2025    CALCIUM 8.9 01/15/2025     01/15/2025    K 4.0 01/15/2025    CO2 28 01/15/2025     01/15/2025    BUN 20 01/15/2025    CREATININE 1.13 01/15/2025    MG 2.08 11/20/2024     Lab Results   Component Value Date    ALT 15 01/15/2025    AST 18 01/15/2025    ALKPHOS 68 01/15/2025    BILITOT 0.8 01/15/2025      Lab Results   Component Value  Date    TSH 2.63 01/25/2022     MRI Brain 11/9/24    IMPRESSION:  1. No intracranial metastases.  2. Chronic intracranial findings as above including an old infarcts  in the right inferior cerebellum, left frontal lobe, and left  parietal lobe including within the postcentral gyrus.    PET 11/11/24    IMPRESSION:  1. Mildly hypermetabolic right apical lung mass, which is stable  compared to prior imaging, although a neoplastic process can not be  excluded.  2. Hypermetabolic L1 vertebral body, consistent with bone metastasis.  3. No hypermetabolic devonte metastasis.    MRI L spine 11/18/24  IMPRESSION:  Redemonstrated is a mass within the L3 vertebral body measuring 4.7 x  3.1 x 4.6 cm with extraosseous extension into the anterior epidural  space resulting in moderate to severe narrowing of the spinal canal.  There is a pathologic fracture of L1 with 75% loss of vertebral body  height. There is 1.1 cm of osseous retropulsion/extra osseous tumor  extending into the anterior epidural space.      Degenerative changes at the additional lumbar levels without  significant spinal canal stenosis. Mild-to-moderate neural foraminal  narrowing as detailed above.    CT CAP 1/30/25  IMPRESSION:  1. Cholelithiasis.  2. Stable appearance of the metastatic lesion within the L1 vertebral  body.  3. Stable appearance of the soft tissue density right apex medially.  4. No new metastatic foci are identified.          Assessment/Plan   Mr. Harpal Reed is an 78 YO with a PMH of 3xCVA (1999, 2014, 2019), HTN, TOSHIA, atrial septal defect, HLD, OA, BPH, glaucoma seen for Stage JUMA ([id number]) adenocarcinoma of RUL.     We discussed the natural history of metastatic NSCLC both with and without treatment. Patient has significant pain related to his L1 lesion but no alarm symptoms. He will undergo SBRT to L spine next week.    Mutational analysis notable for EGFR L858R. He is a great candidate for osimertinib - this was shared with him. We  reviewed side effects and he proceeded after completion of SBRT with [facility] given [facility] is 100% service connected.    CT scans reviewed showing stable disease. He will have labs drawn today, follow in [place] in 1 month with Dat LOWE and see me in 2 months for scans.    #Stage JUMA ([id number]) adenocarcinoma of RUL: EGFR L858R mutant   -Sites of disease include L1 vertebra, RUL, RLL nodule  -Primary issue is back pain caused by metastatic lesion severely limiting functional status which has been dramatically improved after SBRT to L spine   -Continue with osimertinib at 80 mg   -Increase to oxycodone 5mg q4h for back pain scheduled and refilled    Maverick Boyd MD  Thoracic Medical Oncology   [address] [place], [place] [id number]  Phone: [phone]

## 2025-03-05 NOTE — PROGRESS NOTES
"Radiation Oncology Follow-Up    Patient Name:  Willy Reed  MRN:  [id number]  :  1945    Referring Provider: Greta Huang MD PhD  Primary Care Provider: MYNOR Longoria  Care Team: Patient Care Team:  MYNOR Longoria as PCP - General (Family Medicine)  Keily Phelps MD as Primary Care Provider  Maverick Boyd MD as Consulting Physician (Hematology and Oncology)  Misael Simons RN as Nurse Navigator (Hematology and Oncology)    Date of Service: 3/7/2025    SUBJECTIVE  History of Present Illness:  Willy Reed \"Harpal\" is a 79 y.o. male who was referred initially by Maverick Boyd for a consultation to the [facility] Department of Radiation Oncology.  He presented for evaluation and management of newly diagnosed, Stage JUMA, T2 (3.1 cm RUL mass) vs T4 (RUL mass and RLL nodule), Nx, M1b (L1 vertebral body), NSCLC, adenocarcinoma, TPS PD-L1 < 1%, with complaints of low back pain and right lower extremity pain. The patient completed SBRT to his L Spine, 27 Gy in 3 fractions on 12/10/2024. He is presenting today for a scheduled follow-up.    Since last seen in the radiation oncology clinic, he was started on single agent Osimertinib. He underwent on 2025 a re-staging CT C/A/P that showed stable disease. He underwent an MRI on 3/6/2025 that showed:    IMPRESSION:  1. Again noted is the mass within the left 1 vertebral body extending  into the right pedicle. On today's examination, there has been a  decrease in the encroachment in the right anterior epidural space. No  new masses are noted.      MACRO:  None      Signed by: Lee Kapoor 3/6/2025 12:41 PM  Dictation workstation:   [id number]    During today's interview, the patient reported a great improvement in his mobility. He is no longer on a wheelchair and is able to ambulate, sometimes using a cane. He reports that his pain is very mild. He only reports a mild left sided pain when " he tries to get up. There is no urine or stools incontinence. There is no numbness or tingling.     Treatment Rendered:   Radiation Therapy    Treatment Period Technique Fraction Dose Fractions Total Dose   Course 1 12/6/2024-12/10/2024  (days elapsed: 4)         L_Spine 12/6/2024-12/10/2024 SBRT 900 / 900 cGy 3 / 3 2700 / 2,700 cGy       Review of Systems:       Pain: The patient's current pain level was assessed.  They report currently having a pain of 0 out of 10.  They feel their pain is under control without the use of pain medications.     Review of Systems:  Review of Systems   Constitutional:  Negative for appetite change, chills, diaphoresis, fatigue, fever and unexpected weight change.   HENT:   Positive for hearing loss (wears hearing aides) and trouble swallowing. Negative for mouth sores, nosebleeds, sore throat, tinnitus and voice change.    Eyes:  Positive for eye problems (wears glasses; s/p cataract surgery).   Respiratory: Negative.     Cardiovascular:  Positive for leg swelling (occasionally). Negative for chest pain.   Gastrointestinal:  Negative for abdominal distention, abdominal pain, blood in stool, constipation (occasionally takes Miralax), diarrhea and nausea.   Genitourinary:  Positive for nocturia. Negative for bladder incontinence, difficulty urinating, dyspareunia, dysuria, frequency, hematuria, pelvic pain and penile discharge.    Musculoskeletal:  Positive for arthralgias (shoulders pain) and back pain (when standing or walking long distance; feels muscular.; left side.). Negative for flank pain, neck pain and neck stiffness.        Left knee pain   Skin:  Negative for itching, rash and wound.   Neurological:  Positive for numbness. Negative for dizziness, headaches, light-headedness and seizures.   Hematological:  Bruises/bleeds easily.   Psychiatric/Behavioral:  Negative for confusion, decreased concentration, depression, sleep disturbance (sleeps in recliner.) and suicidal ideas.  "The patient is not nervous/anxious.      Performance Status:   The Karnofsky performance scale today is 80, Normal activity with effort; some signs or symptoms of disease (ECOG equivalent 1).       OBJECTIVE  Vital Signs:  There were no vitals taken for this visit.  Physical Exam  Constitutional:       Appearance: Normal appearance. He is normal weight.   Abdominal:      General: Abdomen is flat.      Palpations: Abdomen is soft.   Musculoskeletal:         General: No tenderness. Normal range of motion.   Neurological:      General: No focal deficit present.      Mental Status: He is alert and oriented to person, place, and time. Mental status is at baseline.            IMAGING:  === 01/30/25 ===    CT CHEST ABDOMEN PELVIS W IV CONTRAST    - Impression -  1. Cholelithiasis.  2. Stable appearance of the metastatic lesion within the L1 vertebral  body.  3. Stable appearance of the soft tissue density right apex medially.  4. No new metastatic foci are identified.      MACRO:  none    Signed by: Lee Rodrigues 2/3/2025 8:23 AM  Dictation workstation:   [id number]    MRI on 3/6/2025 that showed:    IMPRESSION:  1. Again noted is the mass within the left 1 vertebral body extending  into the right pedicle. On today's examination, there has been a  decrease in the encroachment in the right anterior epidural space. No  new masses are noted.    ASSESSMENT:   Willy Reed \"Harpal\" is a 79 y.o. male who was referred initially by Maverick Boyd for a consultation to the [facility] Department of Radiation Oncology.  He presented for evaluation and management of newly diagnosed, Stage JUMA, T2 (3.1 cm RUL mass) vs T4 (RUL mass and RLL nodule), Nx, M1b (L1 vertebral body), NSCLC, adenocarcinoma, TPS PD-L1 < 1%, with complaints of low back pain and right lower extremity pain. The patient completed SBRT to his L Spine, 27 Gy in 3 fractions on 12/10/2024. He is on single agent Osimertinib. Disease " seems to be stable in the chest. He has much better mobility and decreased back pain.    IDENTIFYING DATA:  Cancer Staging   No matching staging information was found for the patient.    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  Greta Huang MD PhD

## 2025-03-06 ENCOUNTER — HOSPITAL ENCOUNTER (OUTPATIENT)
Dept: RADIOLOGY | Facility: CLINIC | Age: 80
Discharge: HOME | End: 2025-03-06
Payer: MEDICARE

## 2025-03-06 ENCOUNTER — APPOINTMENT (OUTPATIENT)
Dept: RADIOLOGY | Facility: HOSPITAL | Age: 80
End: 2025-03-06
Payer: MEDICARE

## 2025-03-06 ENCOUNTER — TELEPHONE (OUTPATIENT)
Dept: RADIATION ONCOLOGY | Facility: HOSPITAL | Age: 80
End: 2025-03-06
Payer: MEDICARE

## 2025-03-06 DIAGNOSIS — C79.51 SECONDARY MALIGNANT NEOPLASM OF BONE (MULTI): ICD-10-CM

## 2025-03-06 PROCEDURE — 72158 MRI LUMBAR SPINE W/O & W/DYE: CPT

## 2025-03-06 PROCEDURE — A9575 INJ GADOTERATE MEGLUMI 0.1ML: HCPCS | Performed by: INTERNAL MEDICINE

## 2025-03-06 PROCEDURE — 2550000001 HC RX 255 CONTRASTS: Performed by: INTERNAL MEDICINE

## 2025-03-06 PROCEDURE — 72158 MRI LUMBAR SPINE W/O & W/DYE: CPT | Performed by: RADIOLOGY

## 2025-03-06 RX ORDER — GADOTERATE MEGLUMINE 376.9 MG/ML
20 INJECTION INTRAVENOUS
Status: COMPLETED | OUTPATIENT
Start: 2025-03-06 | End: 2025-03-06

## 2025-03-06 RX ADMIN — GADOTERATE MEGLUMINE 20 ML: 376.9 INJECTION INTRAVENOUS at 11:54

## 2025-03-07 ENCOUNTER — HOSPITAL ENCOUNTER (OUTPATIENT)
Dept: RADIATION ONCOLOGY | Facility: HOSPITAL | Age: 80
Setting detail: RADIATION/ONCOLOGY SERIES
Discharge: HOME | End: 2025-03-07
Payer: MEDICARE

## 2025-03-07 VITALS
RESPIRATION RATE: 18 BRPM | HEART RATE: 73 BPM | BODY MASS INDEX: 31.68 KG/M2 | HEIGHT: 73 IN | DIASTOLIC BLOOD PRESSURE: 71 MMHG | WEIGHT: 239 LBS | OXYGEN SATURATION: 97 % | SYSTOLIC BLOOD PRESSURE: 138 MMHG | TEMPERATURE: 96.8 F

## 2025-03-07 DIAGNOSIS — C79.51 METASTATIC CARCINOMA TO BONE (MULTI): Primary | ICD-10-CM

## 2025-03-07 DIAGNOSIS — S34.111A: ICD-10-CM

## 2025-03-07 PROCEDURE — G2211 COMPLEX E/M VISIT ADD ON: HCPCS | Performed by: INTERNAL MEDICINE

## 2025-03-07 PROCEDURE — 99215 OFFICE O/P EST HI 40 MIN: CPT | Performed by: INTERNAL MEDICINE

## 2025-03-07 ASSESSMENT — ENCOUNTER SYMPTOMS
FEVER: 0
DIZZINESS: 0
LIGHT-HEADEDNESS: 0
NECK STIFFNESS: 0
CONFUSION: 0
DIAPHORESIS: 0
EYE PROBLEMS: 1
RESPIRATORY NEGATIVE: 1
TROUBLE SWALLOWING: 1
BLOOD IN STOOL: 0
ABDOMINAL PAIN: 0
SEIZURES: 0
WOUND: 0
HEMATURIA: 0
DECREASED CONCENTRATION: 0
BACK PAIN: 1
DYSURIA: 0
SLEEP DISTURBANCE: 0
ARTHRALGIAS: 1
UNEXPECTED WEIGHT CHANGE: 0
NUMBNESS: 1
CHILLS: 0
FREQUENCY: 0
VOICE CHANGE: 0
CONSTIPATION: 0
HEADACHES: 0
NAUSEA: 0
NERVOUS/ANXIOUS: 0
DIFFICULTY URINATING: 0
SORE THROAT: 0
NECK PAIN: 0
FLANK PAIN: 0
OCCASIONAL FEELINGS OF UNSTEADINESS: 1
APPETITE CHANGE: 0
DEPRESSION: 0
LOSS OF SENSATION IN FEET: 1
DIARRHEA: 0
FATIGUE: 0
ABDOMINAL DISTENTION: 0
BRUISES/BLEEDS EASILY: 1
LEG SWELLING: 1

## 2025-03-07 ASSESSMENT — COLUMBIA-SUICIDE SEVERITY RATING SCALE - C-SSRS
6. HAVE YOU EVER DONE ANYTHING, STARTED TO DO ANYTHING, OR PREPARED TO DO ANYTHING TO END YOUR LIFE?: NO
1. IN THE PAST MONTH, HAVE YOU WISHED YOU WERE DEAD OR WISHED YOU COULD GO TO SLEEP AND NOT WAKE UP?: NO
2. HAVE YOU ACTUALLY HAD ANY THOUGHTS OF KILLING YOURSELF?: NO

## 2025-03-07 NOTE — PROGRESS NOTES
Radiation Oncology Nursing Note    Pain: The patient's current pain level was assessed.  They report currently having a pain of 0 out of 10.  They feel their pain is under control without the use of pain medications.    Review of Systems:  Review of Systems   Constitutional:  Negative for appetite change, chills, diaphoresis, fatigue, fever and unexpected weight change.   HENT:   Positive for hearing loss (wears hearing aides) and trouble swallowing. Negative for mouth sores, nosebleeds, sore throat, tinnitus and voice change.    Eyes:  Positive for eye problems (wears glasses; s/p cataract surgery).   Respiratory: Negative.     Cardiovascular:  Positive for leg swelling (occasionally). Negative for chest pain.   Gastrointestinal:  Negative for abdominal distention, abdominal pain, blood in stool, constipation (occasionally takes Miralax), diarrhea and nausea.   Genitourinary:  Positive for nocturia. Negative for bladder incontinence, difficulty urinating, dyspareunia, dysuria, frequency, hematuria, pelvic pain and penile discharge.    Musculoskeletal:  Positive for arthralgias (shoulders pain) and back pain (when standing or walking long distance; feels muscular.; left side.). Negative for flank pain, neck pain and neck stiffness.        Left knee pain   Skin:  Negative for itching, rash and wound.   Neurological:  Positive for numbness. Negative for dizziness, headaches, light-headedness and seizures.   Hematological:  Bruises/bleeds easily.   Psychiatric/Behavioral:  Negative for confusion, decreased concentration, depression, sleep disturbance (sleeps in recliner.) and suicidal ideas. The patient is not nervous/anxious.

## 2025-03-14 ENCOUNTER — APPOINTMENT (OUTPATIENT)
Dept: RADIATION ONCOLOGY | Facility: HOSPITAL | Age: 80
End: 2025-03-14
Payer: MEDICARE

## 2025-03-26 ENCOUNTER — LAB (OUTPATIENT)
Dept: LAB | Facility: CLINIC | Age: 80
End: 2025-03-26
Payer: MEDICARE

## 2025-03-26 ENCOUNTER — OFFICE VISIT (OUTPATIENT)
Dept: HEMATOLOGY/ONCOLOGY | Facility: CLINIC | Age: 80
End: 2025-03-26
Payer: MEDICARE

## 2025-03-26 VITALS
SYSTOLIC BLOOD PRESSURE: 127 MMHG | RESPIRATION RATE: 18 BRPM | HEART RATE: 70 BPM | TEMPERATURE: 96.1 F | DIASTOLIC BLOOD PRESSURE: 68 MMHG | BODY MASS INDEX: 30.96 KG/M2 | OXYGEN SATURATION: 96 % | WEIGHT: 234.68 LBS

## 2025-03-26 DIAGNOSIS — C34.91 ADENOCARCINOMA OF LUNG, STAGE 4, RIGHT (MULTI): Primary | ICD-10-CM

## 2025-03-26 DIAGNOSIS — C80.1 METASTATIC CARCINOMA INVOLVING BONE WITH UNKNOWN PRIMARY SITE: ICD-10-CM

## 2025-03-26 DIAGNOSIS — S32.010A CLOSED COMPRESSION FRACTURE OF BODY OF L1 VERTEBRA (MULTI): ICD-10-CM

## 2025-03-26 DIAGNOSIS — M84.48XA PATHOLOGIC FRACTURE OF LUMBAR VERTEBRA, INITIAL ENCOUNTER: ICD-10-CM

## 2025-03-26 DIAGNOSIS — C34.91 ADENOCARCINOMA OF LUNG, STAGE 4, RIGHT (MULTI): ICD-10-CM

## 2025-03-26 DIAGNOSIS — C79.51 METASTATIC CARCINOMA INVOLVING BONE WITH UNKNOWN PRIMARY SITE: ICD-10-CM

## 2025-03-26 DIAGNOSIS — C79.51 SECONDARY CANCER OF BONE (MULTI): ICD-10-CM

## 2025-03-26 LAB
ALBUMIN SERPL BCP-MCNC: 4.2 G/DL (ref 3.4–5)
ALP SERPL-CCNC: 63 U/L (ref 33–136)
ALT SERPL W P-5'-P-CCNC: 18 U/L (ref 10–52)
ANION GAP SERPL CALC-SCNC: 11 MMOL/L (ref 10–20)
AST SERPL W P-5'-P-CCNC: 23 U/L (ref 9–39)
BASOPHILS # BLD AUTO: 0.02 X10*3/UL (ref 0–0.1)
BASOPHILS NFR BLD AUTO: 0.3 %
BILIRUB SERPL-MCNC: 0.7 MG/DL (ref 0–1.2)
BUN SERPL-MCNC: 22 MG/DL (ref 6–23)
CALCIUM SERPL-MCNC: 9.3 MG/DL (ref 8.6–10.6)
CHLORIDE SERPL-SCNC: 105 MMOL/L (ref 98–107)
CO2 SERPL-SCNC: 28 MMOL/L (ref 21–32)
CREAT SERPL-MCNC: 1.16 MG/DL (ref 0.5–1.3)
EGFRCR SERPLBLD CKD-EPI 2021: 63 ML/MIN/1.73M*2
EOSINOPHIL # BLD AUTO: 0.1 X10*3/UL (ref 0–0.4)
EOSINOPHIL NFR BLD AUTO: 1.7 %
ERYTHROCYTE [DISTWIDTH] IN BLOOD BY AUTOMATED COUNT: 13.4 % (ref 11.5–14.5)
GLUCOSE SERPL-MCNC: 119 MG/DL (ref 74–99)
HCT VFR BLD AUTO: 45.7 % (ref 41–52)
HGB BLD-MCNC: 14.8 G/DL (ref 13.5–17.5)
IMM GRANULOCYTES # BLD AUTO: 0 X10*3/UL (ref 0–0.5)
IMM GRANULOCYTES NFR BLD AUTO: 0 % (ref 0–0.9)
LYMPHOCYTES # BLD AUTO: 0.96 X10*3/UL (ref 0.8–3)
LYMPHOCYTES NFR BLD AUTO: 16.4 %
MCH RBC QN AUTO: 30 PG (ref 26–34)
MCHC RBC AUTO-ENTMCNC: 32.4 G/DL (ref 32–36)
MCV RBC AUTO: 93 FL (ref 80–100)
MONOCYTES # BLD AUTO: 0.31 X10*3/UL (ref 0.05–0.8)
MONOCYTES NFR BLD AUTO: 5.3 %
NEUTROPHILS # BLD AUTO: 4.47 X10*3/UL (ref 1.6–5.5)
NEUTROPHILS NFR BLD AUTO: 76.3 %
NRBC BLD-RTO: ABNORMAL /100{WBCS}
PLATELET # BLD AUTO: 90 X10*3/UL (ref 150–450)
POTASSIUM SERPL-SCNC: 4 MMOL/L (ref 3.5–5.3)
PROT SERPL-MCNC: 6.5 G/DL (ref 6.4–8.2)
RBC # BLD AUTO: 4.94 X10*6/UL (ref 4.5–5.9)
SODIUM SERPL-SCNC: 140 MMOL/L (ref 136–145)
WBC # BLD AUTO: 5.9 X10*3/UL (ref 4.4–11.3)

## 2025-03-26 PROCEDURE — 1159F MED LIST DOCD IN RCRD: CPT | Performed by: NURSE PRACTITIONER

## 2025-03-26 PROCEDURE — 85025 COMPLETE CBC W/AUTO DIFF WBC: CPT

## 2025-03-26 PROCEDURE — 99213 OFFICE O/P EST LOW 20 MIN: CPT | Performed by: NURSE PRACTITIONER

## 2025-03-26 PROCEDURE — 3078F DIAST BP <80 MM HG: CPT | Performed by: NURSE PRACTITIONER

## 2025-03-26 PROCEDURE — 80053 COMPREHEN METABOLIC PANEL: CPT

## 2025-03-26 PROCEDURE — 1123F ACP DISCUSS/DSCN MKR DOCD: CPT | Performed by: NURSE PRACTITIONER

## 2025-03-26 PROCEDURE — 1036F TOBACCO NON-USER: CPT | Performed by: NURSE PRACTITIONER

## 2025-03-26 PROCEDURE — 1126F AMNT PAIN NOTED NONE PRSNT: CPT | Performed by: NURSE PRACTITIONER

## 2025-03-26 PROCEDURE — 3074F SYST BP LT 130 MM HG: CPT | Performed by: NURSE PRACTITIONER

## 2025-03-26 PROCEDURE — 36415 COLL VENOUS BLD VENIPUNCTURE: CPT

## 2025-03-26 PROCEDURE — 1160F RVW MEDS BY RX/DR IN RCRD: CPT | Performed by: NURSE PRACTITIONER

## 2025-03-26 ASSESSMENT — ENCOUNTER SYMPTOMS
LOSS OF SENSATION IN FEET: 0
OCCASIONAL FEELINGS OF UNSTEADINESS: 0
DEPRESSION: 0

## 2025-03-26 ASSESSMENT — PAIN SCALES - GENERAL: PAINLEVEL_OUTOF10: 0-NO PAIN

## 2025-03-26 NOTE — PROGRESS NOTES
Patient here today for follow up. He has no new or worsening symptoms.    Fatigue: at baseline  PO intake: adequate +  Weight: stable  N/V/D/C: denies N/V; occasional constipation, relieved with OTC meds PRN, also drinks prune juice nightly    Medications and pharmacy preference reviewed with patient.

## 2025-03-26 NOTE — PROGRESS NOTES
"  Patient ID: Willy Reed \"Harpal\" is a 79 y.o. male    Primary Care Provider: Connie Arriaga, CHING-CNP    DIAGNOSIS AND STAGING  Stage JUMA (cT4 for ipsilateral nodule)NxM1b adenocarcinoma of RUL     SITES OF DISEASE  3.1 x 2.0 cm R lung apical mass  8 mm RLL nodule  L1 vertebral body      MOLECULAR GENOMICS  TPS PD-L1<1%    Positive for AE1/AE3  Positive for EMA  Positive for TTF1 (8G7G3 clone)    Negative for GFAP  Negative for CDX2  Negative for PAX8   Negative for NKX3.1     PRIOR THERAPIES  12/10/2024: Completion SBRT (27-30 cGy/3f) to L spine by Dr. Huang     CURRENT THERAPY  Osimertinib (80 mg) to commence upon completion of SBRT    CURRENT ONCOLOGICAL PROBLEMS  -L1 vertebral body mass     HISTORY OF PRESENT ILLNESS  79 year old male w/ PMH 3xCVA (1999, 2014, 2019), HTN, TOSHIA, atrial septal defect, HLD, OA, BPH, glaucoma, new dx of metastatic lung adenocarcinoma (L1 vertebral body, RUL, RLL) presenting to establish care.    Originally presented 9/30/2024 with 4 weeks of LBP and RLE pain. Underwent MRI lumbar spine 10/3/2024 with L1 vertebral body lesion concerning for malignancy. 10/16/24: PET/CT with increased uptake in L1. 10/31/24 L1 vertebral body biopsy with pathology concerning for pulmonary primary, TTF1 positive c/w lung adenocarcinoma.    11/8/24 CT chest with 8mm RLL nodule (previously 3mm 3/2024), 3.1 x 2.0 cm R lung apex mass    11/9/24 MRI brain negative    11/11/24 PET/CT with stable lung masses    Presenting 11/13/24 to establish care    PAST MEDICAL HISTORY  3xCVA (1999, 2014, 2019), HTN, TOSHIA, atrial septal defect, HLD, OA, BPH, glaucoma    SURGICAL HISTORY  ASD repair 1999  R knee, R shoulder, L shoulder arthroplasty     SOCIAL HISTORY  Lives with wife Judith, 2 children. 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 Denies recreational drug use.     FAMILY HISTORY  Mother - " lymphoma, passed 57 from lymphoma  Brother - thyroid cancer metastatic to lung, passed at 83 from cancer  Maternal uncle - cancer unspecified  Daughter - skin cancer    CURRENT MEDS REVIEWED    Current Outpatient Medications:     apixaban (Eliquis) 5 mg tablet, Take 1 tablet (5 mg) by mouth every 12 hours., Disp: , Rfl:     aspirin 81 mg chewable tablet, Chew 1 tablet (81 mg) once daily., Disp: , Rfl:     atorvastatin (Lipitor) 40 mg tablet, Take 1 tablet (40 mg) by mouth once daily at bedtime., Disp: 90 tablet, Rfl: 3    brimonidine (AlphaGAN P) 0.1 % ophthalmic solution, Administer into affected eye(s) twice a day., Disp: , Rfl:     LORazepam (Ativan) 1 mg tablet, Take 1 tab, 1 hour prior to your MRI., Disp: 2 tablet, Rfl: 0    losartan-hydrochlorothiazide (Hyzaar) 100-25 mg tablet, Take 1 tablet by mouth once daily., Disp: , Rfl:     multivit with minerals/lutein (MULTIVITAMIN 50 PLUS ORAL), Take by mouth once daily., Disp: , Rfl:     netarsudiL-latanoprost (Rocklatan) 0.02-0.005 % drops, Administer into affected eye(s) once daily., Disp: , Rfl:     osimertinib (Tagrisso) 80 mg tablet, Take 1 tablet (80 mg total) by mouth once daily., Disp: , Rfl:     tamsulosin (Flomax) 0.4 mg 24 hr capsule, Take 1 capsule (0.4 mg) by mouth once daily., Disp: , Rfl:     traZODone (Desyrel) 50 mg tablet, Take 1 tablet (50 mg) by mouth once daily at bedtime., Disp: 90 tablet, Rfl: 1       ALLERGIES REVIEWED        SUBJECTIVE:   Patient presents today for routine follow-up evaluation and toxicity check. Presents alone today. He is tolerating the osimertinib without issue. He has no symptom complaints. He denies any fevers, chills or night sweats. No cough, chest pain or shortness of breath. No nausea or vomiting. Occasional constipation, prune juice daily and MiraLax as needed. No urinary symptoms. No rash. No neuropathy.     Pain is dramatically improved.       Review of Systems:  A review of systems has been completed and are  negative for complaints except what is stated in the HPI and/or past medical history      OBJECTIVE:  /68   Pulse 70   Temp 35.6 °C (96.1 °F) (Core)   Resp 18   Wt 106 kg (234 lb 10.9 oz)   SpO2 96%   BMI 30.96 kg/m²     Wt Readings from Last 5 Encounters:   25 106 kg (234 lb 10.9 oz)   25 108 kg (239 lb)   25 104 kg (229 lb)   01/15/25 105 kg (232 lb)   25 105 kg (232 lb)     Physical Exam:  ECO  Pain: 0  Constitutional: Well developed, awake/alert/oriented x3, no distress, alert and cooperative  Eyes: PER. sclera anicteric  ENMT: Oral mucosa moist, no lesions or thrush identified  Respiratory/Thorax: Breathing is non-labored. Lungs are clear to auscultation bilaterally. No adventitious breath sounds  Cardiovascular: S1-S2. Regular rate and rhythm. No murmurs, rubs, or gallops appreciated  Gastrointestinal: Abdomen soft nontender, nondistended, normal active bowel sounds.  Musculoskeletal: ROM intact, no joint swelling, normal strength  Extremities: normal extremities, no cyanosis, no edema, no clubbing  Lymphatics: no palpable lymphadenopathy   Skin: no rash  Neurologic: alert and oriented x3. Nonfocal exam. No myoclonus  Psychological: Pleasant, appropriate and easily engaged       Diagnostic Results     Labs:  Lab Results   Component Value Date    WBC 5.9 2025    HGB 14.8 2025    HCT 45.7 2025    MCV 93 2025    PLT 90 (L) 2025      Lab Results   Component Value Date    NEUTROABS 4.47 2025      CMP pending    Imaging:    MRI Brain 24    IMPRESSION:  1. No intracranial metastases.  2. Chronic intracranial findings as above including an old infarcts  in the right inferior cerebellum, left frontal lobe, and left  parietal lobe including within the postcentral gyrus.    PET 24    IMPRESSION:  1. Mildly hypermetabolic right apical lung mass, which is stable  compared to prior imaging, although a neoplastic process can not  be  excluded.  2. Hypermetabolic L1 vertebral body, consistent with bone metastasis.  3. No hypermetabolic devonte metastasis.    MRI L spine 11/18/24  IMPRESSION:  Redemonstrated is a mass within the L3 vertebral body measuring 4.7 x  3.1 x 4.6 cm with extraosseous extension into the anterior epidural  space resulting in moderate to severe narrowing of the spinal canal.  There is a pathologic fracture of L1 with 75% loss of vertebral body  height. There is 1.1 cm of osseous retropulsion/extra osseous tumor  extending into the anterior epidural space.      Degenerative changes at the additional lumbar levels without  significant spinal canal stenosis. Mild-to-moderate neural foraminal  narrowing as detailed above.    CT CAP 1/30/25  IMPRESSION:  1. Cholelithiasis.  2. Stable appearance of the metastatic lesion within the L1 vertebral  body.  3. Stable appearance of the soft tissue density right apex medially.  4. No new metastatic foci are identified.          Assessment/Plan   Mr. Harpal Reed is an 79 YO with a PMH of 3xCVA (1999, 2014, 2019), HTN, TOSHIA, atrial septal defect, HLD, OA, BPH, glaucoma seen for Stage JUMA ([id number]) adenocarcinoma of RUL.     We discussed the natural history of metastatic NSCLC both with and without treatment. Patient has significant pain related to his L1 lesion but no alarm symptoms. He will undergo SBRT to L spine next week.    Mutational analysis notable for EGFR L858R. He is a great candidate for osimertinib - this was shared with him. We reviewed side effects and he proceeded after completion of SBRT with [facility] given he is 100% service connected.    Most recent CT scans reviewed showing stable disease. He will have labs drawn today.    He will see Dr. Boyd at [facility] following scans and me in [place] for toxicity checks.       #Stage JUMA ([id number]) adenocarcinoma of RUL: EGFR [id number] mutant   -Sites of disease include L1 vertebra, RUL, RLL  nodule  -Primary issue is back pain caused by metastatic lesion severely limiting functional status which has been dramatically improved after SBRT to L spine   -Continue with osimertinib at 80 mg   ---[facility]  -Increase to oxycodone 5mg q4h for back pain scheduled and refilled - not currently requiring     CHING Horowitz-CNP  [facility]  Thoracic + H&N Medical Oncology     I spent a total of 30+ minutes on the date of the service which included preparing to see the patient, face-to-face patient care, completing clinical documentation, obtaining and / or reviewing separately obtained history, counseling and educating the patient/family/caregiver, ordering medications, tests, or procedures, communicating with other healthcare providers (not separately reported), independently interpreting results, not separately reported, and communicating results to the patient/family/caregiver, Name and date of birth verified.

## 2025-03-27 ENCOUNTER — OFFICE VISIT (OUTPATIENT)
Dept: CARDIOLOGY | Facility: CLINIC | Age: 80
End: 2025-03-27
Payer: MEDICARE

## 2025-03-27 VITALS
WEIGHT: 231 LBS | DIASTOLIC BLOOD PRESSURE: 76 MMHG | HEART RATE: 68 BPM | OXYGEN SATURATION: 95 % | SYSTOLIC BLOOD PRESSURE: 134 MMHG | BODY MASS INDEX: 30.48 KG/M2

## 2025-03-27 DIAGNOSIS — I10 PRIMARY HYPERTENSION: ICD-10-CM

## 2025-03-27 PROCEDURE — 3078F DIAST BP <80 MM HG: CPT | Performed by: INTERNAL MEDICINE

## 2025-03-27 PROCEDURE — 1123F ACP DISCUSS/DSCN MKR DOCD: CPT | Performed by: INTERNAL MEDICINE

## 2025-03-27 PROCEDURE — 99214 OFFICE O/P EST MOD 30 MIN: CPT | Performed by: INTERNAL MEDICINE

## 2025-03-27 PROCEDURE — 3074F SYST BP LT 130 MM HG: CPT | Performed by: INTERNAL MEDICINE

## 2025-03-27 PROCEDURE — 1159F MED LIST DOCD IN RCRD: CPT | Performed by: INTERNAL MEDICINE

## 2025-03-27 PROCEDURE — G2211 COMPLEX E/M VISIT ADD ON: HCPCS | Performed by: INTERNAL MEDICINE

## 2025-03-27 PROCEDURE — 1160F RVW MEDS BY RX/DR IN RCRD: CPT | Performed by: INTERNAL MEDICINE

## 2025-03-27 RX ORDER — LOSARTAN POTASSIUM 100 MG/1
100 TABLET ORAL DAILY
Qty: 90 TABLET | Refills: 3 | Status: SHIPPED | OUTPATIENT
Start: 2025-03-27 | End: 2026-03-27

## 2025-03-27 ASSESSMENT — ENCOUNTER SYMPTOMS
CONSTITUTIONAL NEGATIVE: 1
GASTROINTESTINAL NEGATIVE: 1
MUSCULOSKELETAL NEGATIVE: 1
CARDIOVASCULAR NEGATIVE: 1
NEUROLOGICAL NEGATIVE: 1
RESPIRATORY NEGATIVE: 1

## 2025-03-27 NOTE — PROGRESS NOTES
Chief Complaint:   Follow-up (6 mth.)    History Of Present Illness:    .Mr Reed returns in follow up.  Denies chest pain, sob, palpitations or pedal edema.  [facility]       Last Recorded Vitals:  Blood pressure 123/64, pulse 70, weight 105 kg (231 lb), SpO2 95%.     Past Medical History:  Past Medical History:   Diagnosis Date    Cerebral infarction, unspecified 01/25/2022    CVA (cerebral vascular accident)    Essential (primary) hypertension 09/22/2022    HTN (hypertension)    Metastatic cancer (Multi)     Personal history of other diseases of the circulatory system 12/17/2019    History of atrial septal defect    Personal history of other diseases of the nervous system and sense organs     History of sleep apnea    Sleep apnea     Stroke (Multi)         Past Surgical History:  Past Surgical History:   Procedure Laterality Date    CORONARY ARTERY BYPASS GRAFT  09/01/2016    CABG    MR HEAD ANGIO WO IV CONTRAST  06/28/2016    MR HEAD ANGIO WO IV CONTRAST 6/28/2016 [facility]    MR HEAD ANGIO WO IV CONTRAST  01/19/2021    MR HEAD ANGIO WO IV CONTRAST [facility]    MR NECK ANGIO WO IV CONTRAST  06/28/2016    MR NECK ANGIO WO IV CONTRAST 6/28/2016 [facility]    MR NECK ANGIO WO IV CONTRAST  01/20/2021    MR NECK ANGIO WO IV CONTRAST [facility]    OTHER SURGICAL HISTORY  06/25/2013    Injection Of Neurolytic Substance Epidural Thoracic    OTHER SURGICAL HISTORY  07/25/2022    Heart surgery    OTHER SURGICAL HISTORY  07/25/2022    Knee surgery    OTHER SURGICAL HISTORY  09/01/2016    Shoulder Surgery Left    SHOULDER SURGERY  09/01/2016    Shoulder Surgery Right       Social History:  Social History     Socioeconomic History    Marital status:    Tobacco Use    Smoking status: Former     Current packs/day: 0.00     Average packs/day: 0.8 packs/day for 10.7 years (8.0 ttl pk-yrs)     Types: Cigarettes     Start date: 1/1/1962     Quit date:  1972     Years since quittin.5     Passive exposure: Never    Smokeless tobacco: Never   Vaping Use    Vaping status: Never Used   Substance and Sexual Activity    Alcohol use: Yes     Alcohol/week: 1.0 standard drink of alcohol     Types: 1 Cans of beer per week     Comment: rare    Drug use: Never    Sexual activity: Not Currently     Partners: Female     Social Drivers of Health     Financial Resource Strain: Low Risk  (2024)    Overall Financial Resource Strain (CARDIA)     Difficulty of Paying Living Expenses: Not hard at all   Food Insecurity: No Food Insecurity (2024)    Hunger Vital Sign     Worried About Running Out of Food in the Last Year: Never true     Ran Out of Food in the Last Year: Never true   Transportation Needs: No Transportation Needs (2024)    PRAPARE - Transportation     Lack of Transportation (Medical): No     Lack of Transportation (Non-Medical): No   Physical Activity: Patient Declined (2024)    Exercise Vital Sign     Days of Exercise per Week: Patient declined     Minutes of Exercise per Session: Patient declined   Stress: No Stress Concern Present (2024)    [employer] - Occupational Stress Questionnaire     Feeling of Stress : Not at all   Social Connections: Moderately Isolated (2024)    Social Connection and Isolation Panel [NHANES]     Frequency of Communication with Friends and Family: More than three times a week     Frequency of Social Gatherings with Friends and Family: Three times a week     Attends Sabianist Services: Never     Active Member of Clubs or Organizations: No     Attends Club or Organization Meetings: Never     Marital Status:    Intimate Partner Violence: Not At Risk (2024)    Humiliation, Afraid, Rape, and Kick questionnaire     Fear of Current or Ex-Partner: No     Emotionally Abused: No     Physically Abused: No     Sexually Abused: No   Housing Stability: Low Risk   (11/13/2024)    Housing Stability Vital Sign     Unable to Pay for Housing in the Last Year: No     Number of Times Moved in the Last Year: 0     Homeless in the Last Year: No       Family History:  Family History   Problem Relation Name Age of Onset    Cancer Mother Mercy Reed     Cancer Brother Paul Reed          Allergies:  Morphine    Outpatient Medications:  Current Outpatient Medications   Medication Sig Dispense Refill    apixaban (Eliquis) 5 mg tablet Take 1 tablet (5 mg) by mouth every 12 hours.      aspirin 81 mg chewable tablet Chew 1 tablet (81 mg) once daily.      atorvastatin (Lipitor) 40 mg tablet Take 1 tablet (40 mg) by mouth once daily at bedtime. 90 tablet 3    brimonidine (AlphaGAN P) 0.1 % ophthalmic solution Administer into affected eye(s) twice a day.      LORazepam (Ativan) 1 mg tablet Take 1 tab, 1 hour prior to your MRI. 2 tablet 0    losartan-hydrochlorothiazide (Hyzaar) 100-25 mg tablet Take 1 tablet by mouth once daily.      multivit with minerals/lutein (MULTIVITAMIN 50 PLUS ORAL) Take by mouth once daily.      netarsudiL-latanoprost (Rocklatan) 0.02-0.005 % drops Administer into affected eye(s) once daily.      osimertinib (Tagrisso) 80 mg tablet Take 1 tablet (80 mg total) by mouth once daily.      tamsulosin (Flomax) 0.4 mg 24 hr capsule Take 1 capsule (0.4 mg) by mouth once daily.      traZODone (Desyrel) 50 mg tablet Take 1 tablet (50 mg) by mouth once daily at bedtime. 90 tablet 1     No current facility-administered medications for this visit.        Physical Exam:  Cardiovascular:      PMI at left midclavicular line. Normal rate. Regular rhythm. Normal S1. Normal S2.       Murmurs: There is a grade 1to 2/6 systolic murmur.      No gallop.  No click. No rub.   Pulses:     Intact distal pulses.   Edema:     Peripheral edema absent.       ROS:  Review of Systems   Constitutional: Negative.   Cardiovascular: Negative.    Respiratory: Negative.     Skin: Negative.     Musculoskeletal: Negative.    Gastrointestinal: Negative.    Genitourinary: Negative.    Neurological: Negative.           Last Labs:  CBC -  Lab Results   Component Value Date    WBC 5.9 03/26/2025    HGB 14.8 03/26/2025    HCT 45.7 03/26/2025    MCV 93 03/26/2025    PLT 90 (L) 03/26/2025       CMP -  Lab Results   Component Value Date    CALCIUM 9.3 03/26/2025    PROT 6.5 03/26/2025    ALBUMIN 4.2 03/26/2025    AST 23 03/26/2025    ALT 18 03/26/2025    ALKPHOS 63 03/26/2025    BILITOT 0.7 03/26/2025       LIPID PANEL -   Lab Results   Component Value Date    CHOL 119 01/15/2025    TRIG 78 01/15/2025    HDL 53.4 01/15/2025    CHHDL 2.2 01/15/2025    LDLF 49 02/14/2023    VLDL 16 01/15/2025    NHDL 66 01/15/2025       RENAL FUNCTION PANEL -   Lab Results   Component Value Date    GLUCOSE 119 (H) 03/26/2025     03/26/2025    K 4.0 03/26/2025     03/26/2025    CO2 28 03/26/2025    ANIONGAP 11 03/26/2025    BUN 22 03/26/2025    CREATININE 1.16 03/26/2025    GFRMALE 74 02/14/2023    CALCIUM 9.3 03/26/2025    ALBUMIN 4.2 03/26/2025        Lab Results   Component Value Date    HGBA1C 6.3 (A) 01/25/2022         Assessment/Plan   Problem List Items Addressed This Visit    None      Impressions     1. Acute left hemispheric ischemic stroke involving the vertex. The patient presented to [facility] with right arm and leg numbness on June 27th 2016. Symptoms have resolved. Patient had a 48 hour holter monitor that showed a sinus rhythm  bpm with ectopic heart beats. Patient had a zio patch on 9/1/2016 that showed the rhythm is a sinus rhythm with a range from  bpm. Patient will continue Aspirin 81 mg daily and Plavix 75 mg daily. He currently is following up with Dr. Ortiz. Patient had a carotid ultrasound on 6/28/2016 that showed less than 50% stenosis carotid stenosis bilaterally.      2. History of a CVA in 1999 without residual effect.      3. ASD discovered and surgically repaired 1999 with  Dr. Sweeney at [facility], [place]. Patient had an echocardiogram on 6/28/2016 that showed an LVEF 60-65%, moderated left ventricular hypertrophy, no evidence of PFO, mild MR, mild TR and mild dilatation of the ascending aorta.  Echocardiogram performed office visit 9/26/2024 again demonstrates a preserved LV ejection fraction of 55-60% abnormal septal motion due to left bundle branch block conduction delay.  Bubble study was negative for any residual septal defect and there were no significant valvular abnormalities.  EKG office visit 9/26/2024 demonstrates sinus rhythm left bundle branch block conduction delay occasional PVCs.     4. Hypertension blood pressure is well controlled with Losartan 50 mg daily. He was on Lisinopril that was switched to Losartan due to a cough.  The patient is presently on losartan -25 mg daily as of 3/27/2025.  Blood pressure is well within acceptable range.  He is lost 10 pounds undergoing treatment for lung carcinoma.  Will change the losartan -25 mg daily to Strate losartan 100 mg daily.     5. Hyperlipidemia.  Lab work from 1/15/2025 includes a satisfactory lipid panel cholesterol 119 LDL 50 HDL 53 triglycerides 78.  Patient will continue Atorvastatin 40 mg daily.      6. Family history of CAD both his mother and father. Patient had a negative nuclear stress test 6/2013. Patient denies chest pain at today's office visit.      7. Sleep apnea patient currently uses a CPAP     8. Right shoulder replacement      9. Right clavicle and rib fracture secondary to a fall from syncope in 2007      10. Tonsillectomy      11. Former history of tobacco use in 1971      12. Patient had polycythemia with unknown etiology      13. CVA. Patient had another hospital stay, 01/2021 at [facility] with another CVA. MRI showed small strokes both sides of brain. Ultrasound of the carotid showed less than 50% stenosis bilaterally. Patient was driving his car into the driveway after picking up food  and he could not move his foot from the accelerator to the brake pedal fast enough to slow down the pole in his driveway so he was forced to continue down the street into the cul-de-sac which allowed him to slow down enough to pull into the driveway. He then reported to the emergency department. He has had a history of multiple strokes in the past all which had resolved without permanent deficits, however he was told the cause of his strokes were due to an an ASD which has been surgically repaired. Patient saw neurology with complaints of right-sided weakness. Echo was done and showed EF of 60%. He was taken off his Plavix and started on Eliquis, however he could not afford this and was started back on Coumadin. Right-sided weakness improved he did well with PT OT and was discharged home. Patient had an outpatient SYD which had showed similar findings. Patient wore a monitor which again did not show any atrial fibrillation.     14. Coumadin anticoagulation. At this point patient cannot afford DOAC.  Patient has actually been converted back from Coumadin to Eliquis anticoagulation.     15. Hx of covid-19, 11/2020 and both vaccines.      16. Hx of L1 vertebral fracture with home PT. patient continues to have some ongoing back pain.  Lumbosacral spine x-ray 9/19/2024 showed a compression fracture of L1 and a prior fracture of the transverse processes of L1 previously noted 5/17/2021.  X-ray of the right hip showed mild to moderate osteoarthritis.  Patient was seen at urgent care for a cortisone injection and was given a course of prednisone Medrol Dosepak.    17.  Glucose intolerance.  [facility] 6 [facility].  Patient to be scheduled for lab work including CBC CMP lipid panel TSH and PSA.    18.  Adenocarcinoma of the lung.  This patient originally presented 9/30/2024 with 4 weeks of LBP and RLE pain. Underwent MRI lumbar spine 10/3/2024 with L1  vertebral body lesion concerning for malignancy. 10/16/24: PET/CT with increased uptake in L1. 10/31/24 L1 vertebral body biopsy with pathology concerning for pulmonary primary, TTF1 positive c/w lung adenocarcinoma. 11/8/24 CT chest with 8mm RLL nodule (previously 3mm 3/2024), 3.1 x 2.0 cm R lung apex mass. 11/9/24 MRI brain negative. 1/11/24 PET/CT with stable lung masses.  The patient did receive radiation therapy for 3 days to the lumbar spine with significant symptomatic improvement.  He is not ready at this point required any radiation to the lung.  He has had some modest weight loss of approximately 10 pounds.  He was converted from Coumadin to Eliquis.  Lab work 3/26/2025 includes a normal CMP creatinine of 1.16.  CBC is normal except for platelet count 90,000.

## 2025-04-02 DIAGNOSIS — I10 PRIMARY HYPERTENSION: ICD-10-CM

## 2025-04-02 RX ORDER — ATORVASTATIN CALCIUM 40 MG/1
40 TABLET, FILM COATED ORAL NIGHTLY
Qty: 90 TABLET | Refills: 3 | Status: SHIPPED | OUTPATIENT
Start: 2025-04-02

## 2025-04-24 ENCOUNTER — HOSPITAL ENCOUNTER (OUTPATIENT)
Dept: RADIOLOGY | Facility: CLINIC | Age: 80
Discharge: HOME | End: 2025-04-24
Payer: MEDICARE

## 2025-04-24 DIAGNOSIS — C34.91 ADENOCARCINOMA OF LUNG, STAGE 4, RIGHT (MULTI): ICD-10-CM

## 2025-04-24 PROCEDURE — 2550000001 HC RX 255 CONTRASTS: Mod: JZ | Performed by: NURSE PRACTITIONER

## 2025-04-24 PROCEDURE — 74177 CT ABD & PELVIS W/CONTRAST: CPT

## 2025-04-24 RX ADMIN — IOHEXOL 75 ML: 350 INJECTION, SOLUTION INTRAVENOUS at 14:05

## 2025-04-28 NOTE — PROGRESS NOTES
"  Patient ID: Willy Reed \"Harpal\" is a 79 y.o. male    Primary Care Provider: Connie Arriaga, CHING-CNP    DIAGNOSIS AND STAGING  Stage JUMA (cT4 for ipsilateral nodule)NxM1b adenocarcinoma of RUL     SITES OF DISEASE  3.1 x 2.0 cm R lung apical mass  8 mm RLL nodule  L1 vertebral body      MOLECULAR GENOMICS  TPS PD-L1<1%    Positive for AE1/AE3  Positive for EMA  Positive for TTF1 ([id number] clone)    Negative for GFAP  Negative for CDX2  Negative for PAX8   Negative for NKX3.1     PRIOR THERAPIES  12/10/2024: Completion SBRT (27-30 cGy/3f) to L spine by Dr. Huang     CURRENT THERAPY  Osimertinib (80 mg) started 12/2024     CURRENT ONCOLOGICAL PROBLEMS  -L1 vertebral body mass     HISTORY OF PRESENT ILLNESS  79 year old male w/ PMH 3xCVA (1999, 2014, 2019), HTN, TOSHIA, atrial septal defect, HLD, OA, BPH, glaucoma, new dx of metastatic lung adenocarcinoma (L1 vertebral body, RUL, RLL) presenting to establish care.    Originally presented 9/30/2024 with 4 weeks of LBP and RLE pain. Underwent MRI lumbar spine 10/3/2024 with L1 vertebral body lesion concerning for malignancy. 10/16/24: PET/CT with increased uptake in L1. 10/31/24 L1 vertebral body biopsy with pathology concerning for pulmonary primary, TTF1 positive c/w lung adenocarcinoma.    11/8/24 CT chest with 8mm RLL nodule (previously 3mm 3/2024), 3.1 x 2.0 cm R lung apex mass    11/9/24 MRI brain negative    11/11/24 PET/CT with stable lung masses    Presenting 11/13/24 to establish care    PAST MEDICAL HISTORY  3xCVA (1999, 2014, 2019), HTN, TOSHIA, atrial septal defect, HLD, OA, BPH, glaucoma    SURGICAL HISTORY  ASD repair 1999  R knee, R shoulder, L shoulder arthroplasty     SOCIAL HISTORY  Lives with wife Judith, 2 children. 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 Denies recreational drug use.     FAMILY HISTORY  Mother - lymphoma, passed 57 " from lymphoma  Brother - thyroid cancer metastatic to lung, passed at 83 from cancer  Maternal uncle - cancer unspecified  Daughter - skin cancer    CURRENT MEDS REVIEWED    Current Outpatient Medications:     apixaban (Eliquis) 5 mg tablet, Take 1 tablet (5 mg) by mouth every 12 hours., Disp: , Rfl:     aspirin 81 mg chewable tablet, Chew 1 tablet (81 mg) once daily., Disp: , Rfl:     atorvastatin (Lipitor) 40 mg tablet, Take 1 tablet (40 mg) by mouth once daily at bedtime., Disp: 90 tablet, Rfl: 3    brimonidine (AlphaGAN P) 0.1 % ophthalmic solution, Administer into affected eye(s) twice a day., Disp: , Rfl:     LORazepam (Ativan) 1 mg tablet, Take 1 tab, 1 hour prior to your MRI., Disp: 2 tablet, Rfl: 0    losartan (Cozaar) 100 mg tablet, Take 1 tablet (100 mg) by mouth once daily., Disp: 90 tablet, Rfl: 3    multivit with minerals/lutein (MULTIVITAMIN 50 PLUS ORAL), Take by mouth once daily., Disp: , Rfl:     netarsudiL-latanoprost (Rocklatan) 0.02-0.005 % drops, Administer into affected eye(s) once daily., Disp: , Rfl:     osimertinib (Tagrisso) 80 mg tablet, Take 1 tablet (80 mg total) by mouth once daily., Disp: , Rfl:     tamsulosin (Flomax) 0.4 mg 24 hr capsule, Take 1 capsule (0.4 mg) by mouth once daily., Disp: , Rfl:     traZODone (Desyrel) 50 mg tablet, Take 1 tablet (50 mg) by mouth once daily at bedtime., Disp: 90 tablet, Rfl: 1       ALLERGIES REVIEWED        SUBJECTIVE:   Patient presents today for routine follow-up evaluation and toxicity check. Presents alone today. He tripped over a chair with wheels at home and was unable to get up. He had to call his friend for help. She notes a feeling of weakness/fatigue in his legs with exertion. Otherwise, he is tolerating the osimertinib without issue. He denies any fevers, chills or night sweats. No cough, chest pain or shortness of breath. No urinary symptoms. No rash. No neuropathy. His left knee is bothersome but he does not want to pursue any further  surgery.     Review of Systems:  A review of systems has been completed and are negative for complaints except what is stated in the HPI and/or past medical history      OBJECTIVE:  /64   Pulse 68   Temp 35.7 °C (96.3 °F) (Core)   Resp 18   Wt 106 kg (234 lb 8 oz)   SpO2 95%   BMI 30.94 kg/m²     Wt Readings from Last 5 Encounters:   25 106 kg (234 lb 8 oz)   25 105 kg (231 lb)   25 106 kg (234 lb 10.9 oz)   25 108 kg (239 lb)   25 104 kg (229 lb)     Physical Exam:  ECO  Pain: 0  Constitutional: Well developed, awake/alert/oriented x3, no distress, alert and cooperative  Eyes: PER. sclera anicteric  ENMT: Oral mucosa moist, no lesions or thrush identified  Respiratory/Thorax: Breathing is non-labored. Lungs are clear to auscultation bilaterally. No adventitious breath sounds  Cardiovascular: S1-S2. Regular rate and rhythm. No murmurs, rubs, or gallops appreciated  Gastrointestinal: Abdomen soft nontender, nondistended, normal active bowel sounds.  Musculoskeletal: ROM intact, no joint swelling, normal strength  Extremities: normal extremities, no cyanosis, no edema, no clubbing  Lymphatics: no palpable lymphadenopathy   Skin: no rash  Neurologic: alert and oriented x3. Nonfocal exam. No myoclonus  Psychological: Pleasant, appropriate and easily engaged       Diagnostic Results     Labs:  Lab Results   Component Value Date    WBC 5.7 2025    HGB 14.5 2025    HCT 45.5 2025    MCV 93 2025    PLT 90 (L) 2025      Lab Results   Component Value Date    NEUTROABS 4.29 2025        Imaging:    MRI Brain 24    IMPRESSION:  1. No intracranial metastases.  2. Chronic intracranial findings as above including an old infarcts  in the right inferior cerebellum, left frontal lobe, and left  parietal lobe including within the postcentral gyrus.    PET 24    IMPRESSION:  1. Mildly hypermetabolic right apical lung mass, which is  stable  compared to prior imaging, although a neoplastic process can not be  excluded.  2. Hypermetabolic L1 vertebral body, consistent with bone metastasis.  3. No hypermetabolic devonte metastasis.    MRI L spine 11/18/24  IMPRESSION:  Redemonstrated is a mass within the L3 vertebral body measuring 4.7 x  3.1 x 4.6 cm with extraosseous extension into the anterior epidural  space resulting in moderate to severe narrowing of the spinal canal.  There is a pathologic fracture of L1 with 75% loss of vertebral body  height. There is 1.1 cm of osseous retropulsion/extra osseous tumor  extending into the anterior epidural space.      Degenerative changes at the additional lumbar levels without  significant spinal canal stenosis. Mild-to-moderate neural foraminal  narrowing as detailed above.    CT CAP 04/24/25  IMPRESSION:  CHEST:  1.  Stable 32 x 19 mm right lung apex spiculated mass, since  01/30/2025. No new suspicious mass or adenopathy.      ABDOMEN-PELVIS:  1.  No evidence of metastatic disease in the abdomen and pelvis.  2. Stable destructive lytic lesion L1 vertebral body with unchanged  compression deformity.  3. Other stable findings as described above.              Assessment/Plan   Mr. Harpal Reed is an 81 YO with a PMH of 3xCVA (1999, 2014, 2019), HTN, TOSHIA, atrial septal defect, HLD, OA, BPH, glaucoma seen for Stage JUMA (bV1VsG0i) adenocarcinoma of RUL.     We discussed the natural history of metastatic NSCLC both with and without treatment. Patient has significant pain related to his L1 lesion but no alarm symptoms. He underwent SBRT to L spine the L-spine with improvement.     Mutational analysis notable for EGFR L858R. He was recommended osimertinib. We reviewed side effects with [facility] given [facility]% service connected.    Most recent CT scans reviewed showing stable disease. He will have labs drawn today.    #Stage JUMA ([id number]) adenocarcinoma of RUL: EGFR L858R mutant    -Sites of disease include L1 vertebra, RUL, RLL nodule  -Primary issue is back pain caused by metastatic lesion severely limiting functional status which has been dramatically improved after SBRT to L spine   -Continue with osimertinib at 80 mg   ---[facility]  -Increase to oxycodone 5mg q4h for back pain scheduled and refilled - not currently requiring     RTC in [place] with Dat Noonan monthly (is going to [place] for vacation end of June/early July).     RTC in 3 months with me at [facility].     Maverick Boyd MD  Thoracic Medical Oncology   [address] [place], [place] [address]  Phone: [phone]

## 2025-04-30 ENCOUNTER — LAB (OUTPATIENT)
Dept: LAB | Facility: CLINIC | Age: 80
End: 2025-04-30
Payer: MEDICARE

## 2025-04-30 ENCOUNTER — OFFICE VISIT (OUTPATIENT)
Dept: HEMATOLOGY/ONCOLOGY | Facility: CLINIC | Age: 80
End: 2025-04-30
Payer: MEDICARE

## 2025-04-30 VITALS
HEART RATE: 68 BPM | RESPIRATION RATE: 18 BRPM | TEMPERATURE: 96.3 F | SYSTOLIC BLOOD PRESSURE: 126 MMHG | DIASTOLIC BLOOD PRESSURE: 64 MMHG | BODY MASS INDEX: 30.94 KG/M2 | WEIGHT: 234.5 LBS | OXYGEN SATURATION: 95 %

## 2025-04-30 DIAGNOSIS — C34.91 ADENOCARCINOMA OF LUNG, STAGE 4, RIGHT (MULTI): ICD-10-CM

## 2025-04-30 LAB
ALBUMIN SERPL BCP-MCNC: 4.1 G/DL (ref 3.4–5)
ALP SERPL-CCNC: 65 U/L (ref 33–136)
ALT SERPL W P-5'-P-CCNC: 15 U/L (ref 10–52)
ANION GAP SERPL CALC-SCNC: 10 MMOL/L (ref 10–20)
AST SERPL W P-5'-P-CCNC: 19 U/L (ref 9–39)
BASOPHILS # BLD AUTO: 0.02 X10*3/UL (ref 0–0.1)
BASOPHILS NFR BLD AUTO: 0.4 %
BILIRUB SERPL-MCNC: 0.7 MG/DL (ref 0–1.2)
BUN SERPL-MCNC: 22 MG/DL (ref 6–23)
CALCIUM SERPL-MCNC: 9 MG/DL (ref 8.6–10.6)
CHLORIDE SERPL-SCNC: 105 MMOL/L (ref 98–107)
CO2 SERPL-SCNC: 29 MMOL/L (ref 21–32)
CREAT SERPL-MCNC: 1.2 MG/DL (ref 0.5–1.3)
EGFRCR SERPLBLD CKD-EPI 2021: 60 ML/MIN/1.73M*2
EOSINOPHIL # BLD AUTO: 0.12 X10*3/UL (ref 0–0.4)
EOSINOPHIL NFR BLD AUTO: 2.1 %
ERYTHROCYTE [DISTWIDTH] IN BLOOD BY AUTOMATED COUNT: 13.3 % (ref 11.5–14.5)
GLUCOSE SERPL-MCNC: 108 MG/DL (ref 74–99)
HCT VFR BLD AUTO: 45.5 % (ref 41–52)
HGB BLD-MCNC: 14.5 G/DL (ref 13.5–17.5)
IMM GRANULOCYTES # BLD AUTO: 0 X10*3/UL (ref 0–0.5)
IMM GRANULOCYTES NFR BLD AUTO: 0 % (ref 0–0.9)
LYMPHOCYTES # BLD AUTO: 0.94 X10*3/UL (ref 0.8–3)
LYMPHOCYTES NFR BLD AUTO: 16.5 %
MCH RBC QN AUTO: 29.6 PG (ref 26–34)
MCHC RBC AUTO-ENTMCNC: 31.9 G/DL (ref 32–36)
MCV RBC AUTO: 93 FL (ref 80–100)
MONOCYTES # BLD AUTO: 0.33 X10*3/UL (ref 0.05–0.8)
MONOCYTES NFR BLD AUTO: 5.8 %
NEUTROPHILS # BLD AUTO: 4.29 X10*3/UL (ref 1.6–5.5)
NEUTROPHILS NFR BLD AUTO: 75.2 %
NRBC BLD-RTO: ABNORMAL /100{WBCS}
PLATELET # BLD AUTO: 90 X10*3/UL (ref 150–450)
POTASSIUM SERPL-SCNC: 4.1 MMOL/L (ref 3.5–5.3)
PROT SERPL-MCNC: 6.3 G/DL (ref 6.4–8.2)
RBC # BLD AUTO: 4.9 X10*6/UL (ref 4.5–5.9)
SODIUM SERPL-SCNC: 140 MMOL/L (ref 136–145)
WBC # BLD AUTO: 5.7 X10*3/UL (ref 4.4–11.3)

## 2025-04-30 PROCEDURE — 85025 COMPLETE CBC W/AUTO DIFF WBC: CPT

## 2025-04-30 PROCEDURE — 99215 OFFICE O/P EST HI 40 MIN: CPT | Performed by: STUDENT IN AN ORGANIZED HEALTH CARE EDUCATION/TRAINING PROGRAM

## 2025-04-30 PROCEDURE — 1159F MED LIST DOCD IN RCRD: CPT | Performed by: STUDENT IN AN ORGANIZED HEALTH CARE EDUCATION/TRAINING PROGRAM

## 2025-04-30 PROCEDURE — 3074F SYST BP LT 130 MM HG: CPT | Performed by: STUDENT IN AN ORGANIZED HEALTH CARE EDUCATION/TRAINING PROGRAM

## 2025-04-30 PROCEDURE — 1126F AMNT PAIN NOTED NONE PRSNT: CPT | Performed by: STUDENT IN AN ORGANIZED HEALTH CARE EDUCATION/TRAINING PROGRAM

## 2025-04-30 PROCEDURE — G2211 COMPLEX E/M VISIT ADD ON: HCPCS | Performed by: STUDENT IN AN ORGANIZED HEALTH CARE EDUCATION/TRAINING PROGRAM

## 2025-04-30 PROCEDURE — 1036F TOBACCO NON-USER: CPT | Performed by: STUDENT IN AN ORGANIZED HEALTH CARE EDUCATION/TRAINING PROGRAM

## 2025-04-30 PROCEDURE — 36415 COLL VENOUS BLD VENIPUNCTURE: CPT

## 2025-04-30 PROCEDURE — 84075 ASSAY ALKALINE PHOSPHATASE: CPT

## 2025-04-30 PROCEDURE — 1123F ACP DISCUSS/DSCN MKR DOCD: CPT | Performed by: STUDENT IN AN ORGANIZED HEALTH CARE EDUCATION/TRAINING PROGRAM

## 2025-04-30 PROCEDURE — 3078F DIAST BP <80 MM HG: CPT | Performed by: STUDENT IN AN ORGANIZED HEALTH CARE EDUCATION/TRAINING PROGRAM

## 2025-04-30 ASSESSMENT — PAIN SCALES - GENERAL: PAINLEVEL_OUTOF10: 0-NO PAIN

## 2025-04-30 NOTE — PROGRESS NOTES
Patient here today for follow up. He had a fall a few days ago, was sitting down in a desk chair and it broke. He scraped the back of his head and leg. He did not feel he needed to seek emergency care. No dizziness or syncope.     Fatigue: at baseline; no issues with ADLs  PO intake: adequate  Weight: stable  N/V/D/C: denies; uses Miralax a few times a week and a stool softener daily to maintain bowel regularity     Medications and pharmacy preference reviewed with patient.

## 2025-05-27 ENCOUNTER — OFFICE VISIT (OUTPATIENT)
Dept: HEMATOLOGY/ONCOLOGY | Facility: CLINIC | Age: 80
End: 2025-05-27
Payer: MEDICARE

## 2025-05-27 VITALS
RESPIRATION RATE: 18 BRPM | OXYGEN SATURATION: 96 % | BODY MASS INDEX: 31.69 KG/M2 | SYSTOLIC BLOOD PRESSURE: 135 MMHG | WEIGHT: 240.19 LBS | DIASTOLIC BLOOD PRESSURE: 69 MMHG | TEMPERATURE: 96.8 F | HEART RATE: 65 BPM

## 2025-05-27 DIAGNOSIS — C34.91 ADENOCARCINOMA OF LUNG, STAGE 4, RIGHT (MULTI): Primary | ICD-10-CM

## 2025-05-27 PROCEDURE — 3075F SYST BP GE 130 - 139MM HG: CPT | Performed by: NURSE PRACTITIONER

## 2025-05-27 PROCEDURE — 99213 OFFICE O/P EST LOW 20 MIN: CPT | Performed by: NURSE PRACTITIONER

## 2025-05-27 PROCEDURE — 3078F DIAST BP <80 MM HG: CPT | Performed by: NURSE PRACTITIONER

## 2025-05-27 PROCEDURE — 1159F MED LIST DOCD IN RCRD: CPT | Performed by: NURSE PRACTITIONER

## 2025-05-27 PROCEDURE — 1125F AMNT PAIN NOTED PAIN PRSNT: CPT | Performed by: NURSE PRACTITIONER

## 2025-05-27 ASSESSMENT — PAIN SCALES - GENERAL: PAINLEVEL_OUTOF10: 6

## 2025-05-27 NOTE — PROGRESS NOTES
Patient here for follow up visit adenocarcinoma stage iv right lung/ spine.  Patient of Dr Boyd. Taking Osimertinib.  CT CAP 4/24/25 for review today.   Has back pain from an injury years ago.   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     No concerns or complaints noted at this time.   Patient here with    Medications and Allergies reviewed and reconciled this visit.    Follow up per MD request.    Patient reports availability and use of mychart, Reviewed this is a good place to communicate with the team as well as review labs and upcoming orders.      No barriers to education noted, patient agrees to current plan and verbalized understanding using teach back method.

## 2025-05-27 NOTE — PROGRESS NOTES
Patient ID: Harpal Reed is a 80 y.o. male.    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 Huang     CURRENT THERAPY  Osimertinib (80 mg) started 12/2024     CURRENT ONCOLOGICAL PROBLEMS  -L1 vertebral body mass     HISTORY OF PRESENT ILLNESS  79 year old male w/ PMH 3xCVA (1999, 2014, 2019), HTN, TOSHIA, atrial septal defect, HLD, OA, BPH, glaucoma, new dx of metastatic lung adenocarcinoma (L1 vertebral body, RUL, RLL) presenting to establish care.    Originally presented 9/30/2024 with 4 weeks of LBP and RLE pain. Underwent MRI lumbar spine 10/3/2024 with L1 vertebral body lesion concerning for malignancy. 10/16/24: PET/CT with increased uptake in L1. 10/31/24 L1 vertebral body biopsy with pathology concerning for pulmonary primary, TTF1 positive c/w lung adenocarcinoma.    11/8/24 CT chest with 8mm RLL nodule (previously 3mm 3/2024), 3.1 x 2.0 cm R lung apex mass    11/9/24 MRI brain negative    11/11/24 PET/CT with stable lung masses    Presenting 11/13/24 to establish care    PAST MEDICAL HISTORY  3xCVA (1999, 2014, 2019), HTN, TOSHIA, atrial septal defect, HLD, OA, BPH, glaucoma    SURGICAL HISTORY  ASD repair 1999  R knee, R shoulder, L shoulder arthroplasty     SOCIAL HISTORY  Lives with wife Judith, 2 children. 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 Denies recreational drug use.     FAMILY HISTORY  Mother - lymphoma, passed 57 from  lymphoma  Brother - thyroid cancer metastatic to lung, passed at 83 from cancer  Maternal uncle - cancer unspecified  Daughter - skin cancer    CURRENT MEDS REVIEWED  Current Medications[1]       ALLERGIES REVIEWED        SUBJECTIVE:   Patient presents today for routine follow-up evaluation and toxicity check. Presents alone today. He notes a feeling of weakness/fatigue in his legs with exertion. Bilateral knee pain, right knee s/p replacement. Reports left knee needs replaced, he is not interested in pursuing this. Otherwise, he is tolerating the osimertinib without issue. He denies any fevers, chills or night sweats. No cough, chest pain or shortness of breath. No urinary symptoms. No rash. No neuropathy. Ongoing back pain/stiffness, unchanged.     Review of Systems:  A review of systems has been completed and are negative for complaints except what is stated in the HPI and/or past medical history      OBJECTIVE:  /69   Pulse 65   Temp 36 °C (96.8 °F) (Temporal)   Resp 18   Wt 109 kg (240 lb 3.1 oz)   SpO2 96%   BMI 31.69 kg/m²     Wt Readings from Last 5 Encounters:   25 109 kg (240 lb 3.1 oz)   25 106 kg (234 lb 8 oz)   25 105 kg (231 lb)   25 106 kg (234 lb 10.9 oz)   25 108 kg (239 lb)         Physical Exam:  ECO  Pain: chronic knee and back  Constitutional: Well developed, awake/alert/oriented x3, no distress, alert and cooperative  Eyes: PER. sclera anicteric  ENMT: Oral mucosa moist, no lesions or thrush identified  Respiratory/Thorax: Breathing is non-labored. Lungs are clear to auscultation bilaterally. No adventitious breath sounds  Cardiovascular: S1-S2. Regular rate and rhythm. No murmurs, rubs, or gallops appreciated  Gastrointestinal: Abdomen soft nontender, nondistended, normal active bowel sounds.  Musculoskeletal: ROM intact, no joint swelling, normal strength  Extremities: normal extremities, no cyanosis, no edema, no clubbing  Lymphatics: no palpable  lymphadenopathy   Skin: no rash  Neurologic: Nonfocal exam. No myoclonus  Psychological: Pleasant, appropriate and easily engaged       Diagnostic Results   Lab Results   Component Value Date    WBC 5.7 2025    HGB 14.5 2025    HCT 45.5 2025    MCV 93 2025    PLT 90 (L) 2025      Lab Results   Component Value Date    NEUTROABS 4.29 2025      Lab Results   Component Value Date    GLUCOSE 108 (H) 2025    CALCIUM 9.0 2025     2025    K 4.1 2025    CO2 29 2025     2025    BUN 22 2025    CREATININE 1.20 2025    MG 2.08 2024     Lab Results   Component Value Date    ALT 15 2025    AST 19 2025    ALKPHOS 65 2025    BILITOT 0.7 2025      Lab Results   Component Value Date    TSH 2.63 2022         Imagin2025 CT CAP   MPRESSION:  CHEST:  1.  Stable 32 x 19 mm right lung apex spiculated mass, since  2025. No new suspicious mass or adenopathy.      ABDOMEN-PELVIS:  1.  No evidence of metastatic disease in the abdomen and pelvis.  2. Stable destructive lytic lesion L1 vertebral body with unchanged  compression deformity.  3. Other stable findings as described above.    MRI Brain 24    IMPRESSION:  1. No intracranial metastases.  2. Chronic intracranial findings as above including an old infarcts  in the right inferior cerebellum, left frontal lobe, and left  parietal lobe including within the postcentral gyrus.    PET 24    IMPRESSION:  1. Mildly hypermetabolic right apical lung mass, which is stable  compared to prior imaging, although a neoplastic process can not be  excluded.  2. Hypermetabolic L1 vertebral body, consistent with bone metastasis.  3. No hypermetabolic devonte metastasis.    MRI L spine 24  IMPRESSION:  Redemonstrated is a mass within the L3 vertebral body measuring 4.7 x  3.1 x 4.6 cm with extraosseous extension into the anterior epidural  space  resulting in moderate to severe narrowing of the spinal canal.  There is a pathologic fracture of L1 with 75% loss of vertebral body  height. There is 1.1 cm of osseous retropulsion/extra osseous tumor  extending into the anterior epidural space.      Degenerative changes at the additional lumbar levels without  significant spinal canal stenosis. Mild-to-moderate neural foraminal  narrowing as detailed above.    CT CAP 04/24/25  IMPRESSION:  CHEST:  1.  Stable 32 x 19 mm right lung apex spiculated mass, since  01/30/2025. No new suspicious mass or adenopathy.      ABDOMEN-PELVIS:  1.  No evidence of metastatic disease in the abdomen and pelvis.  2. Stable destructive lytic lesion L1 vertebral body with unchanged  compression deformity.  3. Other stable findings as described above.              Assessment/Plan   Mr. Harpal Reed is an 81 YO with a PMH of 3xCVA (1999, 2014, 2019), HTN, TOSHIA, atrial septal defect, HLD, OA, BPH, glaucoma seen for Stage JUMA (qY8MgU3p) adenocarcinoma of RUL.     We discussed the natural history of metastatic NSCLC both with and without treatment. Patient has significant pain related to his L1 lesion but no alarm symptoms. He underwent SBRT to L spine the L-spine with improvement.     Mutational analysis notable for EGFR L858R. He was recommended osimertinib. We reviewed side effects with [facility] given [facility] is 100% service connected.    Most recent CT scans reviewed showing stable disease. He will have labs drawn today.    #Stage JUMA (fW4RpD1h) adenocarcinoma of RUL: EGFR L858R mutant   -Sites of disease include L1 vertebra, RUL, RLL nodule  -Primary issue is back pain caused by metastatic lesion severely limiting functional status which has been dramatically improved after SBRT to L spine   -Continue with osimertinib at 80 mg   ---[facility]  -Increase to oxycodone 5mg q4h for back pain scheduled and refilled - not currently requiring     RTC at [facility]  on August 6 with Dr. Boyd + scan review labs ordered for several days prior  Will plan to see Shaista in [place] for the November and February appts then [facility]UV with Dr. Boyd in [place].     [place] for vacation end of June/early July).       Dat Noonan, APRN-CNP  [facility]  Thoracic + H&N Medical Oncology     I spent a total of 30+ minutes on the date of the service which included preparing to see the patient, face-to-face patient care, completing clinical documentation, obtaining and / or reviewing separately obtained history, counseling and educating the patient/family/caregiver, ordering medications, tests, or procedures, communicating with other healthcare providers (not separately reported), independently interpreting results, not separately reported, and communicating results to the patient/family/caregiver, Name and date of birth verified.              [1]   Current Outpatient Medications:     apixaban (Eliquis) 5 mg tablet, Take 1 tablet (5 mg) by mouth every 12 hours., Disp: , Rfl:     aspirin 81 mg chewable tablet, Chew 1 tablet (81 mg) once daily., Disp: , Rfl:     atorvastatin (Lipitor) 40 mg tablet, Take 1 tablet (40 mg) by mouth once daily at bedtime., Disp: 90 tablet, Rfl: 3    brimonidine (AlphaGAN P) 0.1 % ophthalmic solution, Administer into affected eye(s) twice a day., Disp: , Rfl:     LORazepam (Ativan) 1 mg tablet, Take 1 tab, 1 hour prior to your MRI., Disp: 2 tablet, Rfl: 0    losartan (Cozaar) 100 mg tablet, Take 1 tablet (100 mg) by mouth once daily., Disp: 90 tablet, Rfl: 3    multivit with minerals/lutein (MULTIVITAMIN 50 PLUS ORAL), Take by mouth once daily., Disp: , Rfl:     netarsudiL-latanoprost (Rocklatan) 0.02-0.005 % drops, Administer into affected eye(s) once daily., Disp: , Rfl:     osimertinib (Tagrisso) 80 mg tablet, Take 1 tablet (80 mg total) by mouth once daily., Disp: , Rfl:     tamsulosin (Flomax) 0.4 mg 24 hr capsule, Take 1 capsule (0.4 mg) by mouth once  daily., Disp: , Rfl:     traZODone (Desyrel) 50 mg tablet, Take 1 tablet (50 mg) by mouth once daily at bedtime., Disp: 90 tablet, Rfl: 1

## 2025-06-12 DIAGNOSIS — C79.51 SECONDARY CANCER OF BONE (MULTI): Primary | ICD-10-CM

## 2025-06-12 RX ORDER — OXYCODONE AND ACETAMINOPHEN 5; 325 MG/1; MG/1
1 TABLET ORAL EVERY 4 HOURS PRN
Qty: 60 TABLET | Refills: 0 | Status: SHIPPED | OUTPATIENT
Start: 2025-06-12 | End: 2025-06-22

## 2025-06-13 ENCOUNTER — APPOINTMENT (OUTPATIENT)
Dept: RADIATION ONCOLOGY | Facility: HOSPITAL | Age: 80
End: 2025-06-13
Payer: MEDICARE

## 2025-06-17 ENCOUNTER — OFFICE VISIT (OUTPATIENT)
Dept: WOUND CARE | Facility: HOSPITAL | Age: 80
End: 2025-06-17
Payer: MEDICARE

## 2025-06-17 PROCEDURE — 99213 OFFICE O/P EST LOW 20 MIN: CPT | Mod: 25

## 2025-06-17 PROCEDURE — 29580 STRAPPING UNNA BOOT: CPT | Mod: 50

## 2025-06-18 ENCOUNTER — HOSPITAL ENCOUNTER (OUTPATIENT)
Dept: RADIOLOGY | Facility: CLINIC | Age: 80
Discharge: HOME | End: 2025-06-18
Payer: MEDICARE

## 2025-06-18 PROCEDURE — 72158 MRI LUMBAR SPINE W/O & W/DYE: CPT | Performed by: RADIOLOGY

## 2025-06-18 PROCEDURE — A9575 INJ GADOTERATE MEGLUMI 0.1ML: HCPCS | Performed by: INTERNAL MEDICINE

## 2025-06-18 PROCEDURE — 72158 MRI LUMBAR SPINE W/O & W/DYE: CPT

## 2025-06-18 PROCEDURE — 2550000001 HC RX 255 CONTRASTS: Performed by: INTERNAL MEDICINE

## 2025-06-18 RX ORDER — GADOTERATE MEGLUMINE 376.9 MG/ML
20 INJECTION INTRAVENOUS
Status: COMPLETED | OUTPATIENT
Start: 2025-06-18 | End: 2025-06-18

## 2025-06-18 RX ADMIN — GADOTERATE MEGLUMINE 20 ML: 376.9 INJECTION INTRAVENOUS at 11:34

## 2025-06-20 ENCOUNTER — CLINICAL SUPPORT (OUTPATIENT)
Dept: WOUND CARE | Facility: HOSPITAL | Age: 80
End: 2025-06-20
Payer: MEDICARE

## 2025-06-20 PROCEDURE — 29580 STRAPPING UNNA BOOT: CPT | Mod: 50

## 2025-07-03 ENCOUNTER — OFFICE VISIT (OUTPATIENT)
Dept: WOUND CARE | Facility: HOSPITAL | Age: 80
End: 2025-07-03
Payer: MEDICARE

## 2025-07-03 PROCEDURE — 99213 OFFICE O/P EST LOW 20 MIN: CPT

## 2025-07-07 ENCOUNTER — APPOINTMENT (OUTPATIENT)
Dept: HEMATOLOGY/ONCOLOGY | Facility: CLINIC | Age: 80
End: 2025-07-07
Payer: MEDICARE

## 2025-07-08 ENCOUNTER — TELEPHONE (OUTPATIENT)
Dept: RADIATION ONCOLOGY | Facility: HOSPITAL | Age: 80
End: 2025-07-08
Payer: MEDICARE

## 2025-07-09 ENCOUNTER — HOSPITAL ENCOUNTER (OUTPATIENT)
Dept: RADIATION ONCOLOGY | Facility: HOSPITAL | Age: 80
Setting detail: RADIATION/ONCOLOGY SERIES
Discharge: HOME | End: 2025-07-09
Payer: MEDICARE

## 2025-07-09 VITALS
SYSTOLIC BLOOD PRESSURE: 143 MMHG | TEMPERATURE: 97.2 F | BODY MASS INDEX: 30.37 KG/M2 | DIASTOLIC BLOOD PRESSURE: 66 MMHG | OXYGEN SATURATION: 96 % | RESPIRATION RATE: 18 BRPM | WEIGHT: 230.2 LBS | HEART RATE: 65 BPM

## 2025-07-09 DIAGNOSIS — C79.51 METASTATIC CARCINOMA TO BONE: Primary | ICD-10-CM

## 2025-07-09 PROCEDURE — 99214 OFFICE O/P EST MOD 30 MIN: CPT | Performed by: INTERNAL MEDICINE

## 2025-07-09 PROCEDURE — 99215 OFFICE O/P EST HI 40 MIN: CPT | Performed by: INTERNAL MEDICINE

## 2025-07-09 PROCEDURE — G2211 COMPLEX E/M VISIT ADD ON: HCPCS | Performed by: INTERNAL MEDICINE

## 2025-07-09 ASSESSMENT — ENCOUNTER SYMPTOMS
HEMATOLOGIC/LYMPHATIC NEGATIVE: 1
FATIGUE: 1
ARTHRALGIAS: 1
ROS SKIN COMMENTS: BRUISES EASILY
CARDIOVASCULAR NEGATIVE: 1
FREQUENCY: 1
PSYCHIATRIC NEGATIVE: 1
SHORTNESS OF BREATH: 1
BACK PAIN: 1
GASTROINTESTINAL NEGATIVE: 1
NEUROLOGICAL NEGATIVE: 1

## 2025-07-09 NOTE — PROGRESS NOTES
"Radiation Oncology Follow-Up    Patient Name:  Willy Reed  MRN:  [id number]  :  1945    Referring Provider: Greta Huang MD PhD  Primary Care Provider: MYNOR Longoria  Care Team: Patient Care Team:  MYNOR Longoria as PCP - General (Family Medicine)  Keily Phelps MD as Primary Care Provider  Maverick Boyd MD as Consulting Physician (Hematology and Oncology)  Misael Simons RN as Nurse Navigator (Hematology and Oncology)  Greta Huang MD PhD as Radiation Oncologist (Radiation Oncology)  MYNOR Sen as Nurse Practitioner (Radiation Oncology)    Date of Service: 2025    SUBJECTIVE  History of Present Illness:  Willy Reed \"Harpal\" is a 79 y.o. male who was referred initially by Maverick Boyd for a consultation to the [facility] Department of Radiation Oncology.  He presented for evaluation and management of newly diagnosed, Stage JUMA, T2 (3.1 cm RUL mass) vs T4 (RUL mass and RLL nodule), Nx, M1b (L1 vertebral body), NSCLC, adenocarcinoma, TPS PD-L1 < 1%, with complaints of low back pain and right lower extremity pain. The patient completed SBRT to his L Spine, 27 Gy in 3 fractions on 12/10/2024. He is presenting today for a scheduled follow-up. He was started on single agent Osimertinib. He underwent on 2025 a re-staging CT C/A/P that showed stable disease.  First MRI obtained after 3 months of the SBRT had showed some decrease in the encroachment in the right anterior pleural space and patient was recovering clinically. He is presenting today for follow-up after 6-month.    Since last seen, the patient underwent surveillance CT chest abdomen pelvis on 2025, that showed no evidence of metastatic disease in the abdomen and pelvis.  There was stable destructive lytic lesion in L1 vertebral body.  He also underwent a repeat MRI on 2025, that redemonstrated the dominant osseous metastasis within " the L1 vertebral body.  There was slight progression of the loss of height of the L1 vertebral body.  There was again the osseous expansion at repulsion on the thecal sac.    The patient is pending today to follow-up with his niece.  He reports that he continues to be independent in activities of daily living.  He is able to do his activities of daily living, including walking.  He denies any numbness or tingling.  He denies any urine or stools incontinence.  He does report a bit of increase in his back pain compared to the visit 3 months ago.  But he continues to report a much improved status over the pain that he had prior to SBRT.    Treatment Rendered:   Radiation Therapy    Treatment Period Technique Fraction Dose Fractions Total Dose   Course 1 12/6/2024-12/10/2024  (days elapsed: 4)         L_Spine 12/6/2024-12/10/2024 SBRT 900 / 900 cGy 3 / 3 2,700 / 2,700 cGy       Review of Systems:       Pain: The patient's current pain level was assessed.  They report currently having a pain of 0 out of 10.  They feel their pain is under control without the use of pain medications.   Pt reports not pain in back when he is sitting.   He report increased back pain over the past 1-2 months when he is standing or walking for longer period of time.  Not taking any pain medication currently.     Review of Systems:  Review of Systems   Constitutional:  Positive for fatigue (chronic fatigue).   HENT:   Positive for hearing loss (bilat hearing aids).    Respiratory:  Positive for shortness of breath (intermittently with increased activity, baseline per pt).    Cardiovascular: Negative.    Gastrointestinal: Negative.    Genitourinary:  Positive for frequency.    Musculoskeletal:  Positive for arthralgias and back pain.   Skin:         Bruises easily   Neurological: Negative.    Hematological: Negative.    Psychiatric/Behavioral: Negative.       Performance Status:   The Karnofsky performance scale today is 80, Normal activity with  effort; some signs or symptoms of disease (ECOG equivalent 1).       IMAGING:  === 06/09/25 ===    MR LUMBAR SPINE W AND WO CONTRAST    - Impression -  1. Redemonstration of a dominant osseous metastasis within the L1  vertebral body and extending into the pedicles, right greater than  left with suspected mild epidural extension of neoplasm. There is  again loss of height of the L1 vertebral body of greater than 50%  which appears slightly progressed from the previous examination.  Osseous expansion and retropulsion again produce mass effect on the  thecal sac and contribute to narrowing of the right lateral recesses  at T12-L1 and L1-2 and narrowing of the right L1-2 neuroforamen in  particular. Central canal stenosis and crowding of nerve roots of the  cauda equina within the thecal sac is similar to slightly increased  from the previous exam.  2. The marrow signal is otherwise diffusely inhomogeneous an  additional small osseous metastasis can not be excluded.  3. Redemonstration of multilevel degenerative changes of the lumbar  spine with associated central canal and neuroforaminal stenosis.      MACRO:  None    Signed by: Jewels Skinner 6/19/2025 6:57 AM  Dictation workstation:   [id number]      === 04/24/25 ===    CT CHEST ABDOMEN PELVIS W IV CONTRAST    - Impression -  CHEST:  1.  Stable 32 x 19 mm right lung apex spiculated mass, since  01/30/2025. No new suspicious mass or adenopathy.    ABDOMEN-PELVIS:  1.  No evidence of metastatic disease in the abdomen and pelvis.  2. Stable destructive lytic lesion L1 vertebral body with unchanged  compression deformity.  3. Other stable findings as described above.      Signed by: Yuan Durand 4/25/2025 11:31 PM  Dictation workstation:   [id number]      OBJECTIVE  Vital Signs:  /66   Pulse 65   Temp 36.2 °C (97.2 °F) (Skin)   Resp 18   Wt 104 kg (230 lb 3.2 oz)   SpO2 96%   BMI 30.37 kg/m²     Physical Exam  Constitutional:       Appearance:  "Normal appearance. He is normal weight.   Cardiovascular:      Rate and Rhythm: Normal rate.   Pulmonary:      Effort: Pulmonary effort is normal.      Breath sounds: Normal breath sounds.   Abdominal:      General: Abdomen is flat.   Skin:     General: Skin is warm.   Neurological:      General: No focal deficit present.      Mental Status: He is alert and oriented to person, place, and time. Mental status is at baseline.      Comments: No posterior back tenderness.            ASSESSMENT:   Willy Reed \"Harpal\" is a 79 y.o. male who was referred initially by Maverick Boyd for a consultation to the [facility] Department of Radiation Oncology.  He presented for evaluation and management of newly diagnosed, Stage JUMA, T2 (3.1 cm RUL mass) vs T4 (RUL mass and RLL nodule), Nx, M1b (L1 vertebral body), NSCLC, adenocarcinoma, TPS PD-L1 < 1%, with complaints of low back pain and right lower extremity pain. The patient completed SBRT to his L Spine, 27 Gy in 3 fractions on 12/10/2024. He is presenting today for his second scheduled follow-up.    CT surveillance showed no progression of disease in the chest abdomen pelvis.  The current MRI showed overall stable disease, with some progression of the loss of height of the L1 vertebral body.  The patient continues to be independent his ADLs.  There is no current logical symptoms.  He has a bit of increased pain compared to his previous visit.    During today's interview, I went with the patient and his niece over the current situation.  We explained the relative stability of the MRI appearance, coupled with his relative maintenance of ADL independence, and better clinical status than what he had prior to SBRT.  We expect planed a couple of treatment options that we can offer in case of future progression.  Will acquire an MRI within 2-month.  The patient will follow-up with Jay Rosario NP.  We also referred for palliative medicine.  The " patient can reach back to us in case of increasing pain.    PLAN:    [ ] Repeat MRI of the L-spine with and without gadolinium in 2 months.  [ ] Follow-up with Jay Rosario NP after repeat MRI.  [ ] Refer to palliative medicine.  [ ] Patient can reach back close in case of increasing pain  .    NCCN Guidelines were applicable to guide this patients treatment plan.  Greta Huang MD PhD

## 2025-07-09 NOTE — PROGRESS NOTES
Radiation Oncology Nursing Note    Pain: The patient's current pain level was assessed.  They report currently having a pain of 0 out of 10.  They feel their pain is under control without the use of pain medications.   Pt reports not pain in back when he is sitting.   He report increased back pain over the past 1-2 months when he is standing or walking for longer period of time.  Not taking any pain medication currently.    Review of Systems:  Review of Systems   Constitutional:  Positive for fatigue (chronic fatigue).   HENT:   Positive for hearing loss (bilat hearing aids).    Respiratory:  Positive for shortness of breath (intermittently with increased activity, baseline per pt).    Cardiovascular: Negative.    Gastrointestinal: Negative.    Genitourinary:  Positive for frequency.    Musculoskeletal:  Positive for arthralgias and back pain.   Skin:         Bruises easily   Neurological: Negative.    Hematological: Negative.    Psychiatric/Behavioral: Negative.

## 2025-07-16 ENCOUNTER — APPOINTMENT (OUTPATIENT)
Dept: OTOLARYNGOLOGY | Facility: CLINIC | Age: 80
End: 2025-07-16
Payer: MEDICARE

## 2025-07-16 VITALS — WEIGHT: 230 LBS | HEIGHT: 73 IN | TEMPERATURE: 96.8 F | BODY MASS INDEX: 30.48 KG/M2

## 2025-07-16 DIAGNOSIS — H61.23 BILATERAL IMPACTED CERUMEN: Primary | ICD-10-CM

## 2025-07-16 DIAGNOSIS — J31.0 CHRONIC RHINITIS: ICD-10-CM

## 2025-07-16 DIAGNOSIS — G47.33 OBSTRUCTIVE SLEEP APNEA: ICD-10-CM

## 2025-07-16 DIAGNOSIS — H90.3 BILATERAL SENSORINEURAL HEARING LOSS: ICD-10-CM

## 2025-07-16 PROCEDURE — 1160F RVW MEDS BY RX/DR IN RCRD: CPT | Performed by: OTOLARYNGOLOGY

## 2025-07-16 PROCEDURE — 1159F MED LIST DOCD IN RCRD: CPT | Performed by: OTOLARYNGOLOGY

## 2025-07-16 PROCEDURE — 99214 OFFICE O/P EST MOD 30 MIN: CPT | Performed by: OTOLARYNGOLOGY

## 2025-07-16 NOTE — PROGRESS NOTES
"RASHAD Reed \"Harpal\" is a 80 y.o. male prone to wax.  Requires cleaning every 6 months.  Wears bilateral hearing aids.  Has CPAP and uses faithfully.  Doing very well with this.  Here for 6-month cerumen management.  He has not having specific plugging, otalgia or otorrhea.  He does have a complaint of bilateral nasal drainage.  It is clear and thin.  He does not associate a relationship to eating or temperature changes.  He has tried Flonase and it has not been very helpful.    Past Medical History:   Diagnosis Date    Cerebral infarction, unspecified 01/25/2022    CVA (cerebral vascular accident)    Essential (primary) hypertension 09/22/2022    HTN (hypertension)    Metastatic cancer (Multi)     Personal history of other diseases of the circulatory system 12/17/2019    History of atrial septal defect    Personal history of other diseases of the nervous system and sense organs     History of sleep apnea    Sleep apnea     Stroke (Multi)             Medications:     Current Outpatient Medications:     apixaban (Eliquis) 5 mg tablet, Take 1 tablet (5 mg) by mouth every 12 hours., Disp: , Rfl:     aspirin 81 mg chewable tablet, Chew and swallow 1 tablet (81 mg) once daily., Disp: , Rfl:     atorvastatin (Lipitor) 40 mg tablet, Take 1 tablet (40 mg) by mouth once daily at bedtime., Disp: 90 tablet, Rfl: 3    brimonidine (AlphaGAN P) 0.1 % ophthalmic solution, Administer into affected eye(s) twice a day., Disp: , Rfl:     losartan (Cozaar) 100 mg tablet, Take 1 tablet (100 mg) by mouth once daily., Disp: 90 tablet, Rfl: 3    multivit with minerals/lutein (MULTIVITAMIN 50 PLUS ORAL), Take by mouth once daily., Disp: , Rfl:     netarsudiL-latanoprost (Rocklatan) 0.02-0.005 % drops, Administer into affected eye(s) once daily., Disp: , Rfl:     osimertinib (Tagrisso) 80 mg tablet, Take 1 tablet (80 mg total) by mouth once daily., Disp: , Rfl:     tamsulosin (Flomax) 0.4 mg 24 hr capsule, Take 1 capsule (0.4 mg) by " "mouth once daily., Disp: , Rfl:     traZODone (Desyrel) 50 mg tablet, Take 1 tablet (50 mg) by mouth once daily at bedtime. (Patient not taking: Reported on 7/16/2025), Disp: 90 tablet, Rfl: 1     Allergies:  Allergies   Allergen Reactions    Morphine Nausea And Vomiting        Physical Exam:  Last Recorded Vitals  Temperature 36 °C (96.8 °F), height 1.854 m (6' 1\"), weight 104 kg (230 lb).  General:     General appearance: Well-developed, well-nourished in no acute distress.       Voice:  normal       Head/face: Normal appearance; nontender to palpation     Facial nerve function: Normal and symmetric bilaterally.    Oral/oropharynx:     Oral vestibule: Normal labial and gingival mucosa     Tongue/floor of mouth: Normal without lesion     Oropharynx: Clear.  No lesions present of the hard/soft palate, posterior pharynx    Neck:     Neck: Normal appearance, trachea midline     Salivary glands: Normal to palpation bilaterally     Lymph nodes: No cervical lymphadenopathy to palpation     Thyroid: No thyromegaly.  No palpable nodules     Range of motion: Normal    Neurological:     Cortical functions: Alert and oriented x3, appropriate affect       Larynx/hypopharynx:     Laryngeal findings: Mirror exam inadequate or limited secondary to enlarged base of tongue and/or excessive gagging.      Ear:     Ear canal: Normal bilaterally after cleaning occlusive cerumen impaction bilaterally with microscope, suction, alligator     Tympanic membrane: Intact and mobile bilaterally     Pinna: Normal bilaterally     Hearing:  Gross hearing assessment normal by voice    Nose:     Visualized using: Anterior rhinoscopy     Nasopharynx: Inadequate mirror exam secondary to gag, anatomy.       Nasal dorsum: Nontraumatic midline appearance     Septum: Midline     Inferior turbinates: Normally sized     Mucosa: Bilateral, pink, normal appearing       Assessment/Plan   Ear canals cleaned bilaterally.  Hearing aids were replaced.  He will " continue his hearing aids and follow-up with us in 6 months.  He will continue his CPAP in the meantime.  We discussed the nasal drainage and I recommended a trial of ipratropium nasal spray.  He may call to consider alternatives if this is not helpful after a couple week trial.  I will see him back in 6 months, sooner as needed      Con Martinez MD

## 2025-07-17 ENCOUNTER — TELEPHONE (OUTPATIENT)
Dept: OTOLARYNGOLOGY | Facility: CLINIC | Age: 80
End: 2025-07-17
Payer: MEDICARE

## 2025-07-17 DIAGNOSIS — J31.0 CHRONIC RHINITIS: Primary | ICD-10-CM

## 2025-07-17 NOTE — TELEPHONE ENCOUNTER
Patient was treated in the office yesterday and requested a prescription of the ipratropium nasal spray. Pharmacy listed in chart, thank you.

## 2025-07-18 RX ORDER — IPRATROPIUM BROMIDE 42 UG/1
SPRAY, METERED NASAL
Qty: 30 ML | Refills: 11 | Status: SHIPPED | OUTPATIENT
Start: 2025-07-18

## 2025-07-22 ENCOUNTER — LAB (OUTPATIENT)
Dept: LAB | Facility: CLINIC | Age: 80
End: 2025-07-22
Payer: MEDICARE

## 2025-07-22 DIAGNOSIS — C34.91 ADENOCARCINOMA OF LUNG, STAGE 4, RIGHT (MULTI): ICD-10-CM

## 2025-07-22 LAB
ALBUMIN SERPL BCP-MCNC: 3.8 G/DL (ref 3.4–5)
ALP SERPL-CCNC: 82 U/L (ref 33–136)
ALT SERPL W P-5'-P-CCNC: 21 U/L (ref 10–52)
ANION GAP SERPL CALC-SCNC: 11 MMOL/L (ref 10–20)
AST SERPL W P-5'-P-CCNC: 21 U/L (ref 9–39)
BASOPHILS # BLD AUTO: 0.02 X10*3/UL (ref 0–0.1)
BASOPHILS NFR BLD AUTO: 0.3 %
BILIRUB SERPL-MCNC: 0.7 MG/DL (ref 0–1.2)
BUN SERPL-MCNC: 13 MG/DL (ref 6–23)
CALCIUM SERPL-MCNC: 8.8 MG/DL (ref 8.6–10.3)
CHLORIDE SERPL-SCNC: 107 MMOL/L (ref 98–107)
CO2 SERPL-SCNC: 26 MMOL/L (ref 21–32)
CREAT SERPL-MCNC: 1.09 MG/DL (ref 0.5–1.3)
EGFRCR SERPLBLD CKD-EPI 2021: 68 ML/MIN/1.73M*2
EOSINOPHIL # BLD AUTO: 0.19 X10*3/UL (ref 0–0.4)
EOSINOPHIL NFR BLD AUTO: 3 %
ERYTHROCYTE [DISTWIDTH] IN BLOOD BY AUTOMATED COUNT: 13.3 % (ref 11.5–14.5)
GLUCOSE SERPL-MCNC: 129 MG/DL (ref 74–99)
HCT VFR BLD AUTO: 41.8 % (ref 41–52)
HGB BLD-MCNC: 13.8 G/DL (ref 13.5–17.5)
IMM GRANULOCYTES # BLD AUTO: 0.01 X10*3/UL (ref 0–0.5)
IMM GRANULOCYTES NFR BLD AUTO: 0.2 % (ref 0–0.9)
LYMPHOCYTES # BLD AUTO: 1.07 X10*3/UL (ref 0.8–3)
LYMPHOCYTES NFR BLD AUTO: 16.8 %
MCH RBC QN AUTO: 30 PG (ref 26–34)
MCHC RBC AUTO-ENTMCNC: 33 G/DL (ref 32–36)
MCV RBC AUTO: 91 FL (ref 80–100)
MONOCYTES # BLD AUTO: 0.38 X10*3/UL (ref 0.05–0.8)
MONOCYTES NFR BLD AUTO: 6 %
NEUTROPHILS # BLD AUTO: 4.71 X10*3/UL (ref 1.6–5.5)
NEUTROPHILS NFR BLD AUTO: 73.7 %
NRBC BLD-RTO: 0 /100 WBCS (ref 0–0)
PLATELET # BLD AUTO: 107 X10*3/UL (ref 150–450)
POTASSIUM SERPL-SCNC: 4.1 MMOL/L (ref 3.5–5.3)
PROT SERPL-MCNC: 6.3 G/DL (ref 6.4–8.2)
RBC # BLD AUTO: 4.6 X10*6/UL (ref 4.5–5.9)
SODIUM SERPL-SCNC: 140 MMOL/L (ref 136–145)
WBC # BLD AUTO: 6.4 X10*3/UL (ref 4.4–11.3)

## 2025-07-22 PROCEDURE — 80053 COMPREHEN METABOLIC PANEL: CPT

## 2025-07-22 PROCEDURE — 85025 COMPLETE CBC W/AUTO DIFF WBC: CPT

## 2025-07-22 PROCEDURE — 36415 COLL VENOUS BLD VENIPUNCTURE: CPT

## 2025-07-23 ENCOUNTER — APPOINTMENT (OUTPATIENT)
Dept: RADIATION ONCOLOGY | Facility: HOSPITAL | Age: 80
End: 2025-07-23
Payer: MEDICARE

## 2025-07-29 ENCOUNTER — HOSPITAL ENCOUNTER (OUTPATIENT)
Dept: RADIOLOGY | Facility: CLINIC | Age: 80
Discharge: HOME | End: 2025-07-29
Payer: MEDICARE

## 2025-07-29 DIAGNOSIS — C34.91 ADENOCARCINOMA OF LUNG, STAGE 4, RIGHT (MULTI): ICD-10-CM

## 2025-07-29 PROCEDURE — 74177 CT ABD & PELVIS W/CONTRAST: CPT | Performed by: RADIOLOGY

## 2025-07-29 PROCEDURE — 71260 CT THORAX DX C+: CPT | Performed by: RADIOLOGY

## 2025-07-29 PROCEDURE — 2550000001 HC RX 255 CONTRASTS: Performed by: NURSE PRACTITIONER

## 2025-07-29 PROCEDURE — 74177 CT ABD & PELVIS W/CONTRAST: CPT

## 2025-07-29 RX ADMIN — IOHEXOL 75 ML: 350 INJECTION, SOLUTION INTRAVENOUS at 10:52

## 2025-08-06 ENCOUNTER — OFFICE VISIT (OUTPATIENT)
Dept: HEMATOLOGY/ONCOLOGY | Facility: CLINIC | Age: 80
End: 2025-08-06
Payer: MEDICARE

## 2025-08-06 VITALS
DIASTOLIC BLOOD PRESSURE: 61 MMHG | BODY MASS INDEX: 30.16 KG/M2 | HEART RATE: 65 BPM | SYSTOLIC BLOOD PRESSURE: 113 MMHG | OXYGEN SATURATION: 97 % | TEMPERATURE: 97.9 F | WEIGHT: 228.6 LBS

## 2025-08-06 DIAGNOSIS — C34.91 ADENOCARCINOMA OF LUNG, STAGE 4, RIGHT (MULTI): ICD-10-CM

## 2025-08-06 DIAGNOSIS — C79.51 SECONDARY CANCER OF BONE (MULTI): Primary | ICD-10-CM

## 2025-08-06 DIAGNOSIS — M54.50 ACUTE MIDLINE LOW BACK PAIN WITHOUT SCIATICA: ICD-10-CM

## 2025-08-06 PROCEDURE — 99215 OFFICE O/P EST HI 40 MIN: CPT | Performed by: STUDENT IN AN ORGANIZED HEALTH CARE EDUCATION/TRAINING PROGRAM

## 2025-08-06 PROCEDURE — 3074F SYST BP LT 130 MM HG: CPT | Performed by: STUDENT IN AN ORGANIZED HEALTH CARE EDUCATION/TRAINING PROGRAM

## 2025-08-06 PROCEDURE — 3078F DIAST BP <80 MM HG: CPT | Performed by: STUDENT IN AN ORGANIZED HEALTH CARE EDUCATION/TRAINING PROGRAM

## 2025-08-06 PROCEDURE — G2211 COMPLEX E/M VISIT ADD ON: HCPCS | Performed by: STUDENT IN AN ORGANIZED HEALTH CARE EDUCATION/TRAINING PROGRAM

## 2025-08-06 PROCEDURE — 1125F AMNT PAIN NOTED PAIN PRSNT: CPT | Performed by: STUDENT IN AN ORGANIZED HEALTH CARE EDUCATION/TRAINING PROGRAM

## 2025-08-06 RX ORDER — HEPARIN 100 UNIT/ML
500 SYRINGE INTRAVENOUS AS NEEDED
OUTPATIENT
Start: 2025-08-06

## 2025-08-06 RX ORDER — ZOLEDRONIC ACID 0.04 MG/ML
4 INJECTION, SOLUTION INTRAVENOUS ONCE
OUTPATIENT
Start: 2025-08-12

## 2025-08-06 RX ORDER — DIPHENHYDRAMINE HYDROCHLORIDE 50 MG/ML
50 INJECTION, SOLUTION INTRAMUSCULAR; INTRAVENOUS AS NEEDED
OUTPATIENT
Start: 2025-08-12

## 2025-08-06 RX ORDER — HEPARIN SODIUM,PORCINE/PF 10 UNIT/ML
50 SYRINGE (ML) INTRAVENOUS AS NEEDED
OUTPATIENT
Start: 2025-08-06

## 2025-08-06 RX ORDER — ALBUTEROL SULFATE 0.83 MG/ML
3 SOLUTION RESPIRATORY (INHALATION) AS NEEDED
OUTPATIENT
Start: 2025-08-12

## 2025-08-06 RX ORDER — EPINEPHRINE 0.3 MG/.3ML
0.3 INJECTION SUBCUTANEOUS EVERY 5 MIN PRN
OUTPATIENT
Start: 2025-08-12

## 2025-08-06 RX ORDER — FAMOTIDINE 10 MG/ML
20 INJECTION, SOLUTION INTRAVENOUS ONCE AS NEEDED
OUTPATIENT
Start: 2025-08-12

## 2025-08-06 ASSESSMENT — PAIN SCALES - GENERAL: PAINLEVEL_OUTOF10: 4

## 2025-08-06 NOTE — PROGRESS NOTES
Patient ID: Harpal Reed is a 80 y.o. male.    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 Huang     CURRENT THERAPY  Osimertinib (80 mg) started 12/2024     CURRENT ONCOLOGICAL PROBLEMS  -L1 vertebral body mass     HISTORY OF PRESENT ILLNESS  79 year old male w/ PMH 3xCVA (1999, 2014, 2019), HTN, TOSHIA, atrial septal defect, HLD, OA, BPH, glaucoma, new dx of metastatic lung adenocarcinoma (L1 vertebral body, RUL, RLL) presenting to establish care.    Originally presented 9/30/2024 with 4 weeks of LBP and RLE pain. Underwent MRI lumbar spine 10/3/2024 with L1 vertebral body lesion concerning for malignancy. 10/16/24: PET/CT with increased uptake in L1. 10/31/24 L1 vertebral body biopsy with pathology concerning for pulmonary primary, TTF1 positive c/w lung adenocarcinoma.    11/8/24 CT chest with 8mm RLL nodule (previously 3mm 3/2024), 3.1 x 2.0 cm R lung apex mass    11/9/24 MRI brain negative    11/11/24 PET/CT with stable lung masses    Presenting 11/13/24 to establish care    PAST MEDICAL HISTORY  3xCVA (1999, 2014, 2019), HTN, TOSHIA, atrial septal defect, HLD, OA, BPH, glaucoma    SURGICAL HISTORY  ASD repair 1999  R knee, R shoulder, L shoulder arthroplasty     SOCIAL HISTORY  Lives with wife Judith, 2 children. 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 Denies recreational drug use.     FAMILY HISTORY  Mother - lymphoma, passed 57 from  lymphoma  Brother - thyroid cancer metastatic to lung, passed at 83 from cancer  Maternal uncle - cancer unspecified  Daughter - skin cancer    CURRENT MEDS REVIEWED  Current Medications[1]       ALLERGIES REVIEWED   Allergies[2]      SUBJECTIVE:   Patient presents today for routine follow-up evaluation. He reports baseline chronic knee and back pain. Pain is slightly tolerated on acetaminophen, patient not taking oxycodone until pain is unbearable. He also endorses fatigue at baseline.   Denies CP, SOB, nausea, vomiting, abdominal pain, dysuria, rash and diarrhea.     Review of Systems:  A review of systems has been completed and are negative for complaints except what is stated in the HPI and/or past medical history      OBJECTIVE:  /61 (BP Location: Left arm, Patient Position: Sitting, BP Cuff Size: Adult)   Pulse 65   Temp 36.6 °C (97.9 °F) (Temporal)   Wt 104 kg (228 lb 9.6 oz)   SpO2 97%   BMI 30.16 kg/m²     Wt Readings from Last 5 Encounters:   25 104 kg (228 lb 9.6 oz)   25 104 kg (230 lb)   25 104 kg (230 lb 3.2 oz)   25 109 kg (240 lb 3.1 oz)   25 106 kg (234 lb 8 oz)         Physical Exam:  ECO  Pain: chronic knee and back  Constitutional: Well developed, awake/alert/oriented x3, no distress, alert and cooperative  Eyes: PER. sclera anicteric  ENMT: Oral mucosa moist, no lesions or thrush identified  Respiratory/Thorax: Breathing is non-labored. Lungs are clear to auscultation bilaterally. No adventitious breath sounds  Cardiovascular: S1-S2. Regular rate and rhythm. No murmurs, rubs, or gallops appreciated  Gastrointestinal: Abdomen soft nontender, nondistended, normal active bowel sounds.  Musculoskeletal: LE edema bilaterally 3+ R and 2+ L, hyperpigmentation, patient wearing compression socks  Extremities:  no cyanosis, no clubbing  Lymphatics: no palpable lymphadenopathy   Skin: no rash  Neurologic: Nonfocal exam. No myoclonus  Psychological: Pleasant,  appropriate and easily engaged       Diagnostic Results   Lab Results   Component Value Date    WBC 6.4 2025    HGB 13.8 2025    HCT 41.8 2025    MCV 91 2025     (L) 2025      Lab Results   Component Value Date    NEUTROABS 4.71 2025      Lab Results   Component Value Date    GLUCOSE 129 (H) 2025    CALCIUM 8.8 2025     2025    K 4.1 2025    CO2 26 2025     2025    BUN 13 2025    CREATININE 1.09 2025    MG 2.08 2024     Lab Results   Component Value Date    ALT 21 2025    AST 21 2025    ALKPHOS 82 2025    BILITOT 0.7 2025      Lab Results   Component Value Date    TSH 2.63 2022         Imagin25 CT CAP  IMPRESSION:  Lung cancer restaging scan compared to CT chest, abdomen and pelvis  on 2025:      Stable disease burden as evidenced by stable right upper lobe  pulmonary lesion and osseous metastatic disease involving L1  vertebral body. No new metastatic disease in the chest, abdomen or  pelvis. Additional stable and chronic findings as above.    2025 CT CAP   MPRESSION:  CHEST:  1.  Stable 32 x 19 mm right lung apex spiculated mass, since  2025. No new suspicious mass or adenopathy.      ABDOMEN-PELVIS:  1.  No evidence of metastatic disease in the abdomen and pelvis.  2. Stable destructive lytic lesion L1 vertebral body with unchanged  compression deformity.  3. Other stable findings as described above.    MRI Brain 24    IMPRESSION:  1. No intracranial metastases.  2. Chronic intracranial findings as above including an old infarcts  in the right inferior cerebellum, left frontal lobe, and left  parietal lobe including within the postcentral gyrus.    PET 24  IMPRESSION:  1. Mildly hypermetabolic right apical lung mass, which is stable  compared to prior imaging, although a neoplastic process can not be  excluded.  2. Hypermetabolic L1  vertebral body, consistent with bone metastasis.  3. No hypermetabolic devonte metastasis.    MRI L spine 11/18/24  IMPRESSION:  Redemonstrated is a mass within the L3 vertebral body measuring 4.7 x  3.1 x 4.6 cm with extraosseous extension into the anterior epidural  space resulting in moderate to severe narrowing of the spinal canal.  There is a pathologic fracture of L1 with 75% loss of vertebral body  height. There is 1.1 cm of osseous retropulsion/extra osseous tumor  extending into the anterior epidural space.      Degenerative changes at the additional lumbar levels without  significant spinal canal stenosis. Mild-to-moderate neural foraminal  narrowing as detailed above.    CT CAP 04/24/25  IMPRESSION:  CHEST:  1.  Stable 32 x 19 mm right lung apex spiculated mass, since  01/30/2025. No new suspicious mass or adenopathy.      ABDOMEN-PELVIS:  1.  No evidence of metastatic disease in the abdomen and pelvis.  2. Stable destructive lytic lesion L1 vertebral body with unchanged  compression deformity.  3. Other stable findings as described above.         Assessment/Plan   Mr. Harpal Reed is an 81 YO with a PMH of 3xCVA (1999, 2014, 2019), HTN, TOSHIA, atrial septal defect, HLD, OA, BPH, glaucoma seen for Stage JUMA ([id number]) adenocarcinoma of RUL. Mutational analysis notable for EGFR L858R. On osimertinib.     #Stage JUMA ([id number]) adenocarcinoma of RUL: EGFR L858R mutant   :: Sites of disease include L1 vertebra, RUL, RLL nodule  :: Primary issue is back pain caused by metastatic lesion severely limiting functional status which has been dramatically improved after SBRT to L spine   ::  CT scan 7/29 showing stable disease  Plan  -Continue with osimertinib at 80 mg ([facility])   -Follow up in 3 months, Scan in 3 months  - Zoledronic acid prescribed    Sheri Kumar  PGY-1     And    Maverick Boyd MD  Thoracic Medical Oncology   [address] [place] [address]  Phone: [phone]              [1]   Current Outpatient Medications:     apixaban (Eliquis) 5 mg tablet, Take 1 tablet (5 mg) by mouth every 12 hours., Disp: , Rfl:     aspirin 81 mg chewable tablet, Chew and swallow 1 tablet (81 mg) once daily., Disp: , Rfl:     atorvastatin (Lipitor) 40 mg tablet, Take 1 tablet (40 mg) by mouth once daily at bedtime., Disp: 90 tablet, Rfl: 3    brimonidine (AlphaGAN P) 0.1 % ophthalmic solution, Administer into affected eye(s) twice a day., Disp: , Rfl:     ipratropium (Atrovent) 42 mcg (0.06 %) nasal spray, 2 sprays in each nostril TID, Disp: 30 mL, Rfl: 11    losartan (Cozaar) 100 mg tablet, Take 1 tablet (100 mg) by mouth once daily., Disp: 90 tablet, Rfl: 3    multivit with minerals/lutein (MULTIVITAMIN 50 PLUS ORAL), Take by mouth once daily., Disp: , Rfl:     netarsudiL-latanoprost (Rocklatan) 0.02-0.005 % drops, Administer into affected eye(s) once daily., Disp: , Rfl:     osimertinib (Tagrisso) 80 mg tablet, Take 1 tablet (80 mg total) by mouth once daily., Disp: , Rfl:     tamsulosin (Flomax) 0.4 mg 24 hr capsule, Take 1 capsule (0.4 mg) by mouth once daily., Disp: , Rfl:     traZODone (Desyrel) 50 mg tablet, Take 1 tablet (50 mg) by mouth once daily at bedtime., Disp: 90 tablet, Rfl: 1  [2]   Allergies  Allergen Reactions    Morphine Nausea And Vomiting

## 2025-08-11 ENCOUNTER — OFFICE VISIT (OUTPATIENT)
Dept: PALLIATIVE MEDICINE | Facility: CLINIC | Age: 80
End: 2025-08-11
Payer: MEDICARE

## 2025-08-11 VITALS
RESPIRATION RATE: 18 BRPM | TEMPERATURE: 97 F | HEART RATE: 64 BPM | BODY MASS INDEX: 30.06 KG/M2 | OXYGEN SATURATION: 96 % | SYSTOLIC BLOOD PRESSURE: 133 MMHG | DIASTOLIC BLOOD PRESSURE: 71 MMHG | WEIGHT: 227.85 LBS

## 2025-08-11 DIAGNOSIS — Z71.89 GOALS OF CARE, COUNSELING/DISCUSSION: ICD-10-CM

## 2025-08-11 DIAGNOSIS — Z51.5 PALLIATIVE CARE ENCOUNTER: Primary | ICD-10-CM

## 2025-08-11 DIAGNOSIS — C34.91 ADENOCARCINOMA OF LUNG, STAGE 4, RIGHT (MULTI): ICD-10-CM

## 2025-08-11 DIAGNOSIS — M79.2 NEUROPATHIC PAIN: ICD-10-CM

## 2025-08-11 DIAGNOSIS — G89.3 CANCER RELATED PAIN: ICD-10-CM

## 2025-08-11 PROCEDURE — 3075F SYST BP GE 130 - 139MM HG: CPT | Performed by: NURSE PRACTITIONER

## 2025-08-11 PROCEDURE — 99214 OFFICE O/P EST MOD 30 MIN: CPT | Performed by: NURSE PRACTITIONER

## 2025-08-11 PROCEDURE — 1125F AMNT PAIN NOTED PAIN PRSNT: CPT | Performed by: NURSE PRACTITIONER

## 2025-08-11 PROCEDURE — 3078F DIAST BP <80 MM HG: CPT | Performed by: NURSE PRACTITIONER

## 2025-08-11 ASSESSMENT — PAIN SCALES - GENERAL: PAINLEVEL_OUTOF10: 4

## 2025-08-15 ENCOUNTER — INFUSION (OUTPATIENT)
Dept: HEMATOLOGY/ONCOLOGY | Facility: CLINIC | Age: 80
End: 2025-08-15
Payer: MEDICARE

## 2025-08-15 VITALS
WEIGHT: 222.11 LBS | HEART RATE: 59 BPM | DIASTOLIC BLOOD PRESSURE: 74 MMHG | TEMPERATURE: 96.8 F | RESPIRATION RATE: 18 BRPM | SYSTOLIC BLOOD PRESSURE: 140 MMHG | BODY MASS INDEX: 29.3 KG/M2 | OXYGEN SATURATION: 97 %

## 2025-08-15 DIAGNOSIS — C79.51 SECONDARY CANCER OF BONE (MULTI): ICD-10-CM

## 2025-08-15 DIAGNOSIS — M54.50 ACUTE MIDLINE LOW BACK PAIN WITHOUT SCIATICA: ICD-10-CM

## 2025-08-15 DIAGNOSIS — C34.91 ADENOCARCINOMA OF LUNG, STAGE 4, RIGHT (MULTI): ICD-10-CM

## 2025-08-15 DIAGNOSIS — C34.91 ADENOCARCINOMA OF LUNG, STAGE 4, RIGHT (MULTI): Primary | ICD-10-CM

## 2025-08-15 PROCEDURE — 2500000004 HC RX 250 GENERAL PHARMACY W/ HCPCS (ALT 636 FOR OP/ED): Performed by: STUDENT IN AN ORGANIZED HEALTH CARE EDUCATION/TRAINING PROGRAM

## 2025-08-15 PROCEDURE — 2500000001 HC RX 250 WO HCPCS SELF ADMINISTERED DRUGS (ALT 637 FOR MEDICARE OP): Performed by: STUDENT IN AN ORGANIZED HEALTH CARE EDUCATION/TRAINING PROGRAM

## 2025-08-15 PROCEDURE — 96365 THER/PROPH/DIAG IV INF INIT: CPT | Mod: INF

## 2025-08-15 RX ORDER — ALBUTEROL SULFATE 0.83 MG/ML
3 SOLUTION RESPIRATORY (INHALATION) AS NEEDED
OUTPATIENT
Start: 2025-11-07

## 2025-08-15 RX ORDER — CETIRIZINE HYDROCHLORIDE 10 MG/1
10 TABLET ORAL ONCE
Status: COMPLETED | OUTPATIENT
Start: 2025-08-15 | End: 2025-08-15

## 2025-08-15 RX ORDER — HEPARIN SODIUM,PORCINE/PF 10 UNIT/ML
50 SYRINGE (ML) INTRAVENOUS AS NEEDED
Status: DISCONTINUED | OUTPATIENT
Start: 2025-08-15 | End: 2025-08-15 | Stop reason: HOSPADM

## 2025-08-15 RX ORDER — EPINEPHRINE 0.3 MG/.3ML
0.3 INJECTION SUBCUTANEOUS EVERY 5 MIN PRN
Status: DISCONTINUED | OUTPATIENT
Start: 2025-08-15 | End: 2025-08-15 | Stop reason: HOSPADM

## 2025-08-15 RX ORDER — ACETAMINOPHEN 325 MG/1
650 TABLET ORAL ONCE
Status: COMPLETED | OUTPATIENT
Start: 2025-08-15 | End: 2025-08-15

## 2025-08-15 RX ORDER — LORATADINE 10 MG/1
10 TABLET ORAL ONCE
Status: CANCELLED | OUTPATIENT
Start: 2025-08-15 | End: 2025-08-15

## 2025-08-15 RX ORDER — HEPARIN 100 UNIT/ML
500 SYRINGE INTRAVENOUS AS NEEDED
OUTPATIENT
Start: 2025-08-15

## 2025-08-15 RX ORDER — ZOLEDRONIC ACID 0.04 MG/ML
4 INJECTION, SOLUTION INTRAVENOUS ONCE
Status: COMPLETED | OUTPATIENT
Start: 2025-08-15 | End: 2025-08-15

## 2025-08-15 RX ORDER — ACETAMINOPHEN 325 MG/1
650 TABLET ORAL ONCE
Status: CANCELLED | OUTPATIENT
Start: 2025-08-15 | End: 2025-08-15

## 2025-08-15 RX ORDER — FAMOTIDINE 10 MG/ML
20 INJECTION, SOLUTION INTRAVENOUS ONCE AS NEEDED
OUTPATIENT
Start: 2025-11-07

## 2025-08-15 RX ORDER — DIPHENHYDRAMINE HYDROCHLORIDE 50 MG/ML
50 INJECTION, SOLUTION INTRAMUSCULAR; INTRAVENOUS AS NEEDED
OUTPATIENT
Start: 2025-11-07

## 2025-08-15 RX ORDER — EPINEPHRINE 0.3 MG/.3ML
0.3 INJECTION SUBCUTANEOUS EVERY 5 MIN PRN
OUTPATIENT
Start: 2025-11-07

## 2025-08-15 RX ORDER — DIPHENHYDRAMINE HYDROCHLORIDE 50 MG/ML
50 INJECTION, SOLUTION INTRAMUSCULAR; INTRAVENOUS AS NEEDED
Status: DISCONTINUED | OUTPATIENT
Start: 2025-08-15 | End: 2025-08-15 | Stop reason: HOSPADM

## 2025-08-15 RX ORDER — ZOLEDRONIC ACID 0.04 MG/ML
4 INJECTION, SOLUTION INTRAVENOUS ONCE
OUTPATIENT
Start: 2025-11-07

## 2025-08-15 RX ORDER — FAMOTIDINE 10 MG/ML
20 INJECTION, SOLUTION INTRAVENOUS ONCE AS NEEDED
Status: DISCONTINUED | OUTPATIENT
Start: 2025-08-15 | End: 2025-08-15 | Stop reason: HOSPADM

## 2025-08-15 RX ORDER — HEPARIN SODIUM,PORCINE/PF 10 UNIT/ML
50 SYRINGE (ML) INTRAVENOUS AS NEEDED
OUTPATIENT
Start: 2025-08-15

## 2025-08-15 RX ORDER — ALBUTEROL SULFATE 0.83 MG/ML
3 SOLUTION RESPIRATORY (INHALATION) AS NEEDED
Status: DISCONTINUED | OUTPATIENT
Start: 2025-08-15 | End: 2025-08-15 | Stop reason: HOSPADM

## 2025-08-15 RX ORDER — HEPARIN 100 UNIT/ML
500 SYRINGE INTRAVENOUS AS NEEDED
Status: DISCONTINUED | OUTPATIENT
Start: 2025-08-15 | End: 2025-08-15 | Stop reason: HOSPADM

## 2025-08-15 RX ADMIN — ZOLEDRONIC ACID 4 MG: 0.04 INJECTION, SOLUTION INTRAVENOUS at 14:57

## 2025-08-15 RX ADMIN — CETIRIZINE HYDROCHLORIDE 10 MG: 10 TABLET, FILM COATED ORAL at 15:02

## 2025-08-15 RX ADMIN — ACETAMINOPHEN 650 MG: 325 TABLET ORAL at 15:01

## 2025-08-15 ASSESSMENT — PAIN SCALES - GENERAL: PAINLEVEL_OUTOF10: 4

## 2025-09-04 ENCOUNTER — APPOINTMENT (OUTPATIENT)
Dept: RADIOLOGY | Facility: HOSPITAL | Age: 80
End: 2025-09-04
Payer: MEDICARE

## 2025-09-04 ENCOUNTER — HOSPITAL ENCOUNTER (EMERGENCY)
Facility: HOSPITAL | Age: 80
Discharge: HOME | End: 2025-09-05
Attending: EMERGENCY MEDICINE
Payer: MEDICARE

## 2025-09-04 DIAGNOSIS — Z79.01 LONG TERM (CURRENT) USE OF ANTICOAGULANTS: ICD-10-CM

## 2025-09-04 DIAGNOSIS — S22.41XA CLOSED FRACTURE OF MULTIPLE RIBS OF RIGHT SIDE, INITIAL ENCOUNTER: Primary | ICD-10-CM
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
## 2025-09-04 PROCEDURE — 70450 CT HEAD/BRAIN W/O DYE: CPT

## 2025-09-04 PROCEDURE — 99284 EMERGENCY DEPT VISIT MOD MDM: CPT | Mod: 25 | Performed by: EMERGENCY MEDICINE

## 2025-09-04 PROCEDURE — 70450 CT HEAD/BRAIN W/O DYE: CPT | Performed by: RADIOLOGY

## 2025-09-04 PROCEDURE — 2500000001 HC RX 250 WO HCPCS SELF ADMINISTERED DRUGS (ALT 637 FOR MEDICARE OP): Performed by: EMERGENCY MEDICINE

## 2025-09-04 PROCEDURE — 2500000005 HC RX 250 GENERAL PHARMACY W/O HCPCS: Performed by: EMERGENCY MEDICINE

## 2025-09-04 PROCEDURE — 71250 CT THORAX DX C-: CPT

## 2025-09-04 PROCEDURE — 72125 CT NECK SPINE W/O DYE: CPT

## 2025-09-04 PROCEDURE — 72125 CT NECK SPINE W/O DYE: CPT | Performed by: RADIOLOGY

## 2025-09-04 RX ORDER — LIDOCAINE 560 MG/1
1 PATCH PERCUTANEOUS; TOPICAL; TRANSDERMAL ONCE
Status: DISCONTINUED | OUTPATIENT
Start: 2025-09-04 | End: 2025-09-05 | Stop reason: HOSPADM

## 2025-09-04 RX ORDER — OXYCODONE AND ACETAMINOPHEN 5; 325 MG/1; MG/1
1 TABLET ORAL ONCE
Refills: 0 | Status: COMPLETED | OUTPATIENT
Start: 2025-09-04 | End: 2025-09-04

## 2025-09-04 RX ADMIN — LIDOCAINE 4% 1 PATCH: 40 PATCH TOPICAL at 23:55

## 2025-09-04 RX ADMIN — OXYCODONE HYDROCHLORIDE AND ACETAMINOPHEN 1 TABLET: 5; 325 TABLET ORAL at 23:55

## 2025-09-04 ASSESSMENT — PAIN SCALES - GENERAL
PAINLEVEL_OUTOF10: 0 - NO PAIN
PAINLEVEL_OUTOF10: 8
PAINLEVEL_OUTOF10: 5 - MODERATE PAIN

## 2025-09-04 ASSESSMENT — PAIN - FUNCTIONAL ASSESSMENT
PAIN_FUNCTIONAL_ASSESSMENT: 0-10
PAIN_FUNCTIONAL_ASSESSMENT: 0-10

## 2025-09-04 ASSESSMENT — PAIN DESCRIPTION - ORIENTATION: ORIENTATION: RIGHT

## 2025-09-04 ASSESSMENT — PAIN DESCRIPTION - PAIN TYPE: TYPE: ACUTE PAIN

## 2025-09-05 VITALS
WEIGHT: 234.53 LBS | SYSTOLIC BLOOD PRESSURE: 154 MMHG | OXYGEN SATURATION: 95 % | TEMPERATURE: 97.7 F | BODY MASS INDEX: 30.1 KG/M2 | HEART RATE: 65 BPM | HEIGHT: 74 IN | DIASTOLIC BLOOD PRESSURE: 77 MMHG | RESPIRATION RATE: 18 BRPM

## 2025-09-05 PROCEDURE — 96372 THER/PROPH/DIAG INJ SC/IM: CPT | Performed by: EMERGENCY MEDICINE

## 2025-09-05 PROCEDURE — 9420000001 HC RT PATIENT EDUCATION 5 MIN

## 2025-09-05 PROCEDURE — 2500000004 HC RX 250 GENERAL PHARMACY W/ HCPCS (ALT 636 FOR OP/ED): Mod: JZ | Performed by: EMERGENCY MEDICINE

## 2025-09-05 RX ORDER — HYDROMORPHONE HYDROCHLORIDE 1 MG/ML
1 INJECTION, SOLUTION INTRAMUSCULAR; INTRAVENOUS; SUBCUTANEOUS ONCE
Status: COMPLETED | OUTPATIENT
Start: 2025-09-05 | End: 2025-09-05

## 2025-09-05 RX ORDER — HYDROMORPHONE HYDROCHLORIDE 1 MG/ML
1 INJECTION, SOLUTION INTRAMUSCULAR; INTRAVENOUS; SUBCUTANEOUS ONCE
Status: DISCONTINUED | OUTPATIENT
Start: 2025-09-05 | End: 2025-09-05

## 2025-09-05 RX ORDER — OXYCODONE HYDROCHLORIDE 5 MG/1
5 TABLET ORAL EVERY 6 HOURS PRN
Qty: 12 TABLET | Refills: 0 | Status: SHIPPED | OUTPATIENT
Start: 2025-09-05 | End: 2025-09-08

## 2025-09-05 RX ADMIN — HYDROMORPHONE HYDROCHLORIDE 1 MG: 1 INJECTION, SOLUTION INTRAMUSCULAR; INTRAVENOUS; SUBCUTANEOUS at 01:52

## 2025-09-05 ASSESSMENT — PAIN - FUNCTIONAL ASSESSMENT: PAIN_FUNCTIONAL_ASSESSMENT: 0-10

## 2025-09-05 ASSESSMENT — PAIN SCALES - GENERAL
PAINLEVEL_OUTOF10: 2
PAINLEVEL_OUTOF10: 2
PAINLEVEL_OUTOF10: 5 - MODERATE PAIN

## 2025-09-05 ASSESSMENT — PAIN DESCRIPTION - PAIN TYPE: TYPE: ACUTE PAIN

## 2025-09-08 ENCOUNTER — APPOINTMENT (OUTPATIENT)
Dept: RADIOLOGY | Facility: CLINIC | Age: 80
End: 2025-09-08
Payer: MEDICARE

## 2025-09-12 ENCOUNTER — APPOINTMENT (OUTPATIENT)
Dept: RADIOLOGY | Facility: CLINIC | Age: 80
End: 2025-09-12
Payer: MEDICARE

## 2025-11-18 ENCOUNTER — APPOINTMENT (OUTPATIENT)
Dept: HEMATOLOGY/ONCOLOGY | Facility: CLINIC | Age: 80
End: 2025-11-18
Payer: MEDICARE

## 2026-01-09 ENCOUNTER — APPOINTMENT (OUTPATIENT)
Dept: PRIMARY CARE | Facility: CLINIC | Age: 81
End: 2026-01-09
Payer: MEDICARE

## 2026-01-14 ENCOUNTER — APPOINTMENT (OUTPATIENT)
Dept: OTOLARYNGOLOGY | Facility: CLINIC | Age: 81
End: 2026-01-14
Payer: MEDICARE